# Patient Record
Sex: FEMALE | Race: BLACK OR AFRICAN AMERICAN | NOT HISPANIC OR LATINO | Employment: OTHER | URBAN - METROPOLITAN AREA
[De-identification: names, ages, dates, MRNs, and addresses within clinical notes are randomized per-mention and may not be internally consistent; named-entity substitution may affect disease eponyms.]

---

## 2017-01-02 ENCOUNTER — TELEPHONE (OUTPATIENT)
Dept: INTERNAL MEDICINE | Facility: CLINIC | Age: 72
End: 2017-01-02

## 2017-01-02 DIAGNOSIS — E83.52 HYPERCALCEMIA: Primary | ICD-10-CM

## 2017-01-02 NOTE — TELEPHONE ENCOUNTER
Please informed patient that labs are stable, except for mildly elevated calcium and depressed potassium.  Recommend potassium supplementation with tomatoes, bananas and low sugar on orange juice.  Use one of these daily for 5-7 days.  Also, schedule repeat labs to further evaluate elevated calcium.

## 2017-01-05 ENCOUNTER — TELEPHONE (OUTPATIENT)
Dept: INTERNAL MEDICINE | Facility: CLINIC | Age: 72
End: 2017-01-05

## 2017-01-05 NOTE — TELEPHONE ENCOUNTER
Please informed patient that labs are stable, except for mildly elevated calcium and depressed potassium. Recommend potassium supplementation with tomatoes, bananas and low sugar on orange juice. Use one of these daily for 5-7 days. Also, schedule repeat labs to further evaluate elevated calcium    Spoke with the patient and advised of the labs and she states she eats a banana daily and also advised that she needs to add the Tomato for lunch for 7 days and come do repeat lab on Saturday 1/14/2017 Patient understood and scheduled the appt.

## 2017-01-05 NOTE — TELEPHONE ENCOUNTER
----- Message from Aurora Gu sent at 1/5/2017 12:53 PM CST -----  Contact: Self [phone]  Patient is returning a phone call.  Who left a message for the patient:   Does patient know what this is regarding:    Comments:

## 2017-01-14 ENCOUNTER — LAB VISIT (OUTPATIENT)
Dept: LAB | Facility: HOSPITAL | Age: 72
End: 2017-01-14
Attending: INTERNAL MEDICINE
Payer: COMMERCIAL

## 2017-01-14 DIAGNOSIS — E83.52 HYPERCALCEMIA: ICD-10-CM

## 2017-01-14 LAB — CA-I BLDV-SCNC: 1.21 MMOL/L

## 2017-01-14 PROCEDURE — 83970 ASSAY OF PARATHORMONE: CPT

## 2017-01-14 PROCEDURE — 36415 COLL VENOUS BLD VENIPUNCTURE: CPT | Mod: PO

## 2017-01-14 PROCEDURE — 82330 ASSAY OF CALCIUM: CPT

## 2017-01-16 LAB — PTH-INTACT SERPL-MCNC: 35 PG/ML

## 2017-01-17 ENCOUNTER — OFFICE VISIT (OUTPATIENT)
Dept: OPTOMETRY | Facility: CLINIC | Age: 72
End: 2017-01-17
Payer: COMMERCIAL

## 2017-01-17 DIAGNOSIS — H50.15 ALTERNATING EXOTROPIA: ICD-10-CM

## 2017-01-17 DIAGNOSIS — H25.13 NUCLEAR SCLEROSIS, BILATERAL: ICD-10-CM

## 2017-01-17 DIAGNOSIS — E11.9 TYPE 2 DIABETES MELLITUS WITHOUT RETINOPATHY: Primary | ICD-10-CM

## 2017-01-17 PROCEDURE — 92015 DETERMINE REFRACTIVE STATE: CPT | Mod: S$GLB,,, | Performed by: OPTOMETRIST

## 2017-01-17 PROCEDURE — 99999 PR PBB SHADOW E&M-EST. PATIENT-LVL II: CPT | Mod: PBBFAC,,, | Performed by: OPTOMETRIST

## 2017-01-17 PROCEDURE — 92014 COMPRE OPH EXAM EST PT 1/>: CPT | Mod: S$GLB,,, | Performed by: OPTOMETRIST

## 2017-01-17 NOTE — PROGRESS NOTES
HPI     DLS: 1/14/2016  Pt states va has gotten blurry since last visit. Pt states she no longer   drives at night bc she can not see.Pt srates she does not wear glasses   because glasses does not improve vision.  States left eye has been twitching a lot more than usual.  Denies f/f,   diplopia, and headaches    No gtts     Does not check BS   Hemoglobin A1C       Date                     Value               Ref Range             Status                12/07/2016               6.1                 4.5 - 6.2 %           Final                   04/12/2016               6.2                 4.5 - 6.2 %           Final                 05/14/2015               6.0                 4.5 - 6.2 %           Final            ----------       Last edited by Elena Soto on 1/17/2017  2:58 PM.     ROS     Positive for: Gastrointestinal, Endocrine, Cardiovascular, Eyes    Negative for: Constitutional, Neurological, Skin, Genitourinary,   Musculoskeletal, HENT, Respiratory, Psychiatric, Allergic/Imm, Heme/Lymph    Last edited by Juan Blanchard, OD on 1/17/2017  3:08 PM. (History)        Assessment /Plan     For exam results, see Encounter Report.    Type 2 diabetes mellitus without retinopathy    Nuclear sclerosis, bilateral    Alternating exotropia        1. AXT   2. Cat ou--pt wishes surgery.    3. DM- WITHOUT RETINOPATHY.  Advised yearly DFE        Plan:     Surgical consult-- Dr Juárez

## 2017-02-15 NOTE — TELEPHONE ENCOUNTER
----- Message from Eve Hdz sent at 2/15/2017 12:57 PM CST -----  Contact: patient [phone]  RX request - refill or new RX.  Is this a refill or new RX:  Refill  RX name and strength: diazepam 5 mg  Directions:   Is this a 30 day or 90 day RX:    Pharmacy name and phone #: [facility] [phone]  Comments:

## 2017-02-16 RX ORDER — DIAZEPAM 5 MG/1
5 TABLET ORAL NIGHTLY PRN
Qty: 30 TABLET | Refills: 3 | Status: SHIPPED | OUTPATIENT
Start: 2017-02-16 | End: 2017-07-23 | Stop reason: SDUPTHER

## 2017-03-08 RX ORDER — METFORMIN HYDROCHLORIDE 500 MG/1
TABLET ORAL
Qty: 60 TABLET | Refills: 3 | Status: SHIPPED | OUTPATIENT
Start: 2017-03-08 | End: 2017-05-02 | Stop reason: SDUPTHER

## 2017-03-08 RX ORDER — HYDROCHLOROTHIAZIDE 25 MG/1
TABLET ORAL
Qty: 30 TABLET | Refills: 3 | Status: SHIPPED | OUTPATIENT
Start: 2017-03-08 | End: 2017-07-04 | Stop reason: SDUPTHER

## 2017-03-08 RX ORDER — VALSARTAN 160 MG/1
TABLET ORAL
Qty: 30 TABLET | Refills: 3 | Status: SHIPPED | OUTPATIENT
Start: 2017-03-08 | End: 2017-07-03 | Stop reason: SDUPTHER

## 2017-03-08 RX ORDER — LOVASTATIN 40 MG/1
TABLET ORAL
Qty: 30 TABLET | Refills: 3 | Status: SHIPPED | OUTPATIENT
Start: 2017-03-08 | End: 2017-07-03 | Stop reason: SDUPTHER

## 2017-04-06 RX ORDER — PANTOPRAZOLE SODIUM 40 MG/1
TABLET, DELAYED RELEASE ORAL
Qty: 30 TABLET | Refills: 3 | Status: SHIPPED | OUTPATIENT
Start: 2017-04-06 | End: 2017-08-01 | Stop reason: SDUPTHER

## 2017-04-17 RX ORDER — TRAZODONE HYDROCHLORIDE 150 MG/1
TABLET ORAL
Qty: 30 TABLET | Refills: 3 | Status: SHIPPED | OUTPATIENT
Start: 2017-04-17 | End: 2017-08-15 | Stop reason: SDUPTHER

## 2017-04-28 ENCOUNTER — TELEPHONE (OUTPATIENT)
Dept: INTERNAL MEDICINE | Facility: CLINIC | Age: 72
End: 2017-04-28

## 2017-04-28 RX ORDER — NIFEDIPINE 90 MG/1
TABLET, EXTENDED RELEASE ORAL
Qty: 30 TABLET | Refills: 3 | Status: SHIPPED | OUTPATIENT
Start: 2017-04-28 | End: 2017-05-02 | Stop reason: SDUPTHER

## 2017-04-28 RX ORDER — NIFEDIPINE 90 MG/1
TABLET, EXTENDED RELEASE ORAL
Qty: 30 TABLET | Refills: 3 | Status: SHIPPED | OUTPATIENT
Start: 2017-04-28 | End: 2018-06-04 | Stop reason: SDUPTHER

## 2017-04-28 NOTE — TELEPHONE ENCOUNTER
----- Message from Ori Freeman sent at 4/27/2017 11:43 AM CDT -----  Contact: self  [phone]  Patient would like to get medical advice.  Symptoms (please be specific):  diarrhea   How long has patient had these symptoms:  1 month or longer   Pharmacy name and phone #:  [facility] [id number]  [phone] (Phone)  [phone] (Fax)  Any drug allergies:  Darvocet A500  [Propoxyphene   Comments: Pt like to know if she is taken any type of med's that is causing the diarrha.

## 2017-05-02 ENCOUNTER — OFFICE VISIT (OUTPATIENT)
Dept: INTERNAL MEDICINE | Facility: CLINIC | Age: 72
End: 2017-05-02
Payer: COMMERCIAL

## 2017-05-02 ENCOUNTER — LAB VISIT (OUTPATIENT)
Dept: LAB | Facility: HOSPITAL | Age: 72
End: 2017-05-02
Attending: INTERNAL MEDICINE
Payer: COMMERCIAL

## 2017-05-02 VITALS
TEMPERATURE: 99 F | BODY MASS INDEX: 30.86 KG/M2 | DIASTOLIC BLOOD PRESSURE: 70 MMHG | HEIGHT: 64 IN | HEART RATE: 82 BPM | WEIGHT: 180.75 LBS | SYSTOLIC BLOOD PRESSURE: 128 MMHG

## 2017-05-02 DIAGNOSIS — R19.7 DIARRHEA, UNSPECIFIED TYPE: Primary | ICD-10-CM

## 2017-05-02 DIAGNOSIS — R19.7 DIARRHEA, UNSPECIFIED TYPE: ICD-10-CM

## 2017-05-02 LAB
ALBUMIN SERPL BCP-MCNC: 3.5 G/DL
ALP SERPL-CCNC: 83 U/L
ALT SERPL W/O P-5'-P-CCNC: 27 U/L
ANION GAP SERPL CALC-SCNC: 14 MMOL/L
AST SERPL-CCNC: 47 U/L
BILIRUB SERPL-MCNC: 0.5 MG/DL
BUN SERPL-MCNC: 13 MG/DL
CALCIUM SERPL-MCNC: 10 MG/DL
CHLORIDE SERPL-SCNC: 103 MMOL/L
CO2 SERPL-SCNC: 23 MMOL/L
CREAT SERPL-MCNC: 1.4 MG/DL
EST. GFR  (AFRICAN AMERICAN): 43.3 ML/MIN/1.73 M^2
EST. GFR  (NON AFRICAN AMERICAN): 37.6 ML/MIN/1.73 M^2
GLUCOSE SERPL-MCNC: 99 MG/DL
POTASSIUM SERPL-SCNC: 3.5 MMOL/L
PROT SERPL-MCNC: 8.1 G/DL
SODIUM SERPL-SCNC: 140 MMOL/L

## 2017-05-02 PROCEDURE — 99999 PR PBB SHADOW E&M-EST. PATIENT-LVL III: CPT | Mod: PBBFAC,,, | Performed by: INTERNAL MEDICINE

## 2017-05-02 PROCEDURE — 1126F AMNT PAIN NOTED NONE PRSNT: CPT | Mod: S$GLB,,, | Performed by: INTERNAL MEDICINE

## 2017-05-02 PROCEDURE — 1157F ADVNC CARE PLAN IN RCRD: CPT | Mod: 8P,S$GLB,, | Performed by: INTERNAL MEDICINE

## 2017-05-02 PROCEDURE — 3074F SYST BP LT 130 MM HG: CPT | Mod: S$GLB,,, | Performed by: INTERNAL MEDICINE

## 2017-05-02 PROCEDURE — 80053 COMPREHEN METABOLIC PANEL: CPT

## 2017-05-02 PROCEDURE — 3078F DIAST BP <80 MM HG: CPT | Mod: S$GLB,,, | Performed by: INTERNAL MEDICINE

## 2017-05-02 PROCEDURE — 36415 COLL VENOUS BLD VENIPUNCTURE: CPT | Mod: PO

## 2017-05-02 PROCEDURE — 1160F RVW MEDS BY RX/DR IN RCRD: CPT | Mod: S$GLB,,, | Performed by: INTERNAL MEDICINE

## 2017-05-02 PROCEDURE — 1159F MED LIST DOCD IN RCRD: CPT | Mod: S$GLB,,, | Performed by: INTERNAL MEDICINE

## 2017-05-02 PROCEDURE — 99213 OFFICE O/P EST LOW 20 MIN: CPT | Mod: S$GLB,,, | Performed by: INTERNAL MEDICINE

## 2017-05-02 RX ORDER — METFORMIN HYDROCHLORIDE 500 MG/1
500 TABLET ORAL
Qty: 60 TABLET | Refills: 3
Start: 2017-05-02 | End: 2017-11-06 | Stop reason: SDUPTHER

## 2017-05-02 NOTE — PROGRESS NOTES
CC: diarrhea  HPI:  The patient is a 71 y.o. year old female who presents to the office for diarrhea.  She reports a long history of diarrhea, but reports worsening symptoms recently.  She denies any recent antibiotic use.  She denies any abdominal pain, but does report occasional nausea.  She denies any direct association with meals, and does report occasional accidents.  She denies any blood in stool.    The patient reports decreased appetite.   She has stopped taking fish oil with some improvement.  Her last colonoscopy was 2014.  The patient has been under a lot of stress.  She recently learned her sister is dying.    PAST MEDICAL HISTORY:  Past Medical History:   Diagnosis Date    Alternating exotropia 4/18/2013    Bilateral cataracts     Depression     Diabetes mellitus     GERD (gastroesophageal reflux disease)     Hyperlipidemia     Hypertension     Menopausal state     Multiple gastric polyps     on EGD in 2004    Sickle cell trait        SURGICAL HISTORY:  Past Surgical History:   Procedure Laterality Date    CHOLECYSTECTOMY      laproscopic cholecystectomy    HYSTERECTOMY      DENISHA/BSO - fibroids    Laparoscopic right hemicolectomy      OOPHORECTOMY         MEDS:  Medcard reviewed and updated    ALLERGIES: Allergy Card reviewed and updated    SOCIAL HISTORY:   The patient is a nonsmoker.    PE:   APPEARANCE: Well nourished, well developed, in no acute distress.    CHEST: Lungs clear to auscultation with unlabored respirations.  CARDIOVASCULAR: Normal S1, S2. No murmurs. No carotid bruits. No pedal edema.  ABDOMEN: Bowel sounds normal. Not distended. Soft. No tenderness or masses.   PSYCHIATRIC: The patient is oriented to person, place, and time and has a pleasant affect.        ASSESSMENT/PLAN:  Val was seen today for gi problem.    Diagnoses and all orders for this visit:    Diarrhea, unspecified type  -     Comprehensive metabolic panel; Future   -     Decrease metformin to 500 mg a  day  -     Remain off fish oil    Other orders  -     metformin (GLUCOPHAGE) 500 MG tablet; Take 1 tablet (500 mg total) by mouth daily with breakfast.

## 2017-05-02 NOTE — MR AVS SNAPSHOT
[facility]   [facility]  [place] [address]  Phone: [phone]  Fax: [phone]                  Val Bragg   2017 3:00 PM   Office Visit    Description:  Female : 1945   Provider:  Anh Atkinson MD   Department:  [facility]           Reason for Visit     GI Problem           Diagnoses this Visit        Comments    Diarrhea, unspecified type    -  Primary            To Do List           Future Appointments        Provider Department Dept Phone    2017 2:00 PM Dustin Juárez MD [facility] - Ophthalmology [phone]      Goals (5 Years of Data)     None      Follow-Up and Disposition     Return in about 3 months (around 2017).       These Medications        Disp Refills Start End    metformin (GLUCOPHAGE) 500 MG tablet 60 tablet 3 2017     Take 1 tablet (500 mg total) by mouth daily with breakfast. - Oral    Pharmacy: [facility] [address] [place] [address] AT [facility] [address] Ph #: [phone]         [facility]     [facility] -  Assistance  Unless otherwise directed by your provider, please contact [facility], our nurse care line that is available for  assistance.     Registered nurses in the [facility] provide: appointment scheduling, clinical advisement, health education, and other advisory services.  Call: [phone] (toll free)               Medications           Message regarding Medications     Verify the changes and/or additions to your medication regime listed below are the same as discussed with your clinician today.  If any of these changes or additions are incorrect, please notify your healthcare provider.        CHANGE how you are taking these medications     Start Taking Instead of    metformin (GLUCOPHAGE) 500 MG tablet metformin (GLUCOPHAGE) 500 MG tablet    Dosage:  Take 1 tablet (500 mg total) by  "mouth daily with breakfast. Dosage:  TAKE 1 TABLET(500 MG) BY MOUTH TWICE DAILY    Reason for Change:  Reorder       STOP taking these medications     OMEGA-3S/DHA/EPA/FISH OIL (OMEGA 3 ORAL) Take 1 capsule by mouth once daily.           Verify that the below list of medications is an accurate representation of the medications you are currently taking.  If none reported, the list may be blank. If incorrect, please contact your healthcare provider. Carry this list with you in case of emergency.           Current Medications     blood sugar diagnostic Strp 1 strip by Misc.(Non-Drug; Combo Route) route once daily.    diazePAM (VALIUM) 5 MG tablet Take 1 tablet (5 mg total) by mouth nightly as needed for Anxiety.    fluoxetine (PROZAC) 40 MG capsule TAKE 1 CAPSULE BY MOUTH EVERY DAY    hydrochlorothiazide (HYDRODIURIL) 25 MG tablet TAKE 1 TABLET BY MOUTH EVERY DAY    lovastatin (MEVACOR) 40 MG tablet TAKE 1 TABLET BY MOUTH EVERY NIGHT AT BEDTIME    metformin (GLUCOPHAGE) 500 MG tablet Take 1 tablet (500 mg total) by mouth daily with breakfast.    pantoprazole (PROTONIX) 40 MG tablet TAKE 1 TABLET BY MOUTH ONCE DAILY    trazodone (DESYREL) 150 MG tablet TAKE 1 TABLET BY MOUTH EVERY NIGHT AT BEDTIME    valsartan (DIOVAN) 160 MG tablet TAKE 1 TABLET BY MOUTH ONCE DAILY    mometasone (NASONEX) 50 mcg/actuation nasal spray 2 sprays by Nasal route every evening.    nifedipine (PROCARDIA-XL) 90 MG (OSM) TR24 TAKE 1 TABLET(90 MG) BY MOUTH EVERY DAY           Clinical Reference Information           Your Vitals Were     BP Pulse Temp Height Weight BMI    128/70 82 98.5 °F (36.9 °C) 5' 4" (1.626 m) 82 kg (180 lb 12.4 oz) 31.03 kg/m2      Blood Pressure          Most Recent Value    BP  128/70      Allergies as of 5/2/2017     Darvocet A500  [Propoxyphene N-acetaminophen]      Immunizations Administered on Date of Encounter - 5/2/2017     None      Orders Placed During Today's Visit     Future Labs/Procedures Expected by Expires "    Comprehensive metabolic panel  5/2/2017 7/1/2018      Instructions    Decrease metformin to once daily.       Language Assistance Services     ATTENTION: Language assistance services are available, free of charge. Please call [phone].      ATENCIÓN: Si tita sow, tiene a henry disposición servicios gratuitos de asistencia lingüística. Llame al [phone].     CHÚ Ý: N?u b?n nói Ti?ng Vi?t, có các d?ch v? h? tr? ngôn ng? mi?n phí dành cho b?n. G?i s? [phone].         [facility] complies with applicable Federal civil rights laws and does not discriminate on the basis of race, color, national origin, age, disability, or sex.

## 2017-05-24 ENCOUNTER — OFFICE VISIT (OUTPATIENT)
Dept: OPHTHALMOLOGY | Facility: CLINIC | Age: 72
End: 2017-05-24
Payer: COMMERCIAL

## 2017-05-24 DIAGNOSIS — H25.13 NUCLEAR SCLEROSIS, BILATERAL: Primary | ICD-10-CM

## 2017-05-24 DIAGNOSIS — H52.209 ASTIGMATISM, UNSPECIFIED: ICD-10-CM

## 2017-05-24 PROCEDURE — 99999 PR PBB SHADOW E&M-EST. PATIENT-LVL II: CPT | Mod: PBBFAC,,, | Performed by: OPHTHALMOLOGY

## 2017-05-24 PROCEDURE — 92014 COMPRE OPH EXAM EST PT 1/>: CPT | Mod: S$GLB,,, | Performed by: OPHTHALMOLOGY

## 2017-05-24 NOTE — PROGRESS NOTES
HPI     Referred by Dr. Blanchard    Patient here for a cataract evaluation.  Patient states her vision is   blurry when watching TV with her glasses on. She says does not necessarily   have problems at night. She states she does not go out much at night. She   does not drive.     Last edited by Nelly Ashford on 5/24/2017  2:18 PM. (History)            Assessment /Plan     For exam results, see Encounter Report.    Nuclear sclerosis, bilateral    Astigmatism, unspecified      2+ NSC with high level astigmatism.  Rec TORIC IOLs but pt to consider...

## 2017-06-16 DIAGNOSIS — E11.9 TYPE 2 DIABETES MELLITUS WITHOUT COMPLICATION: ICD-10-CM

## 2017-07-03 RX ORDER — METFORMIN HYDROCHLORIDE 500 MG/1
TABLET ORAL
Qty: 60 TABLET | Refills: 3 | Status: SHIPPED | OUTPATIENT
Start: 2017-07-03 | End: 2017-08-02 | Stop reason: SDUPTHER

## 2017-07-03 RX ORDER — VALSARTAN 160 MG/1
TABLET ORAL
Qty: 30 TABLET | Refills: 3 | Status: SHIPPED | OUTPATIENT
Start: 2017-07-03 | End: 2017-10-29 | Stop reason: SDUPTHER

## 2017-07-03 RX ORDER — LOVASTATIN 40 MG/1
TABLET ORAL
Qty: 30 TABLET | Refills: 3 | Status: SHIPPED | OUTPATIENT
Start: 2017-07-03 | End: 2017-10-29 | Stop reason: SDUPTHER

## 2017-07-05 RX ORDER — HYDROCHLOROTHIAZIDE 25 MG/1
TABLET ORAL
Qty: 30 TABLET | Refills: 3 | Status: SHIPPED | OUTPATIENT
Start: 2017-07-05 | End: 2018-06-04 | Stop reason: SDUPTHER

## 2017-07-18 RX ORDER — HYDROCHLOROTHIAZIDE 25 MG/1
TABLET ORAL
Qty: 30 TABLET | Refills: 3 | Status: SHIPPED | OUTPATIENT
Start: 2017-07-18 | End: 2017-08-02 | Stop reason: SDUPTHER

## 2017-07-24 RX ORDER — DIAZEPAM 5 MG/1
TABLET ORAL
Qty: 30 TABLET | Refills: 2 | Status: SHIPPED | OUTPATIENT
Start: 2017-07-24 | End: 2017-11-06 | Stop reason: SDUPTHER

## 2017-08-01 RX ORDER — PANTOPRAZOLE SODIUM 40 MG/1
TABLET, DELAYED RELEASE ORAL
Qty: 30 TABLET | Refills: 0 | Status: SHIPPED | OUTPATIENT
Start: 2017-08-01 | End: 2017-08-29 | Stop reason: SDUPTHER

## 2017-08-02 ENCOUNTER — OFFICE VISIT (OUTPATIENT)
Dept: INTERNAL MEDICINE | Facility: CLINIC | Age: 72
End: 2017-08-02
Payer: COMMERCIAL

## 2017-08-02 ENCOUNTER — HOSPITAL ENCOUNTER (OUTPATIENT)
Dept: RADIOLOGY | Facility: HOSPITAL | Age: 72
Discharge: HOME | End: 2017-08-02
Attending: INTERNAL MEDICINE
Payer: COMMERCIAL

## 2017-08-02 VITALS
HEIGHT: 64 IN | DIASTOLIC BLOOD PRESSURE: 72 MMHG | SYSTOLIC BLOOD PRESSURE: 138 MMHG | RESPIRATION RATE: 16 BRPM | TEMPERATURE: 99 F | BODY MASS INDEX: 31.32 KG/M2 | HEART RATE: 63 BPM | WEIGHT: 183.44 LBS

## 2017-08-02 DIAGNOSIS — M54.5 LOW BACK PAIN, UNSPECIFIED BACK PAIN LATERALITY, UNSPECIFIED CHRONICITY, WITH SCIATICA PRESENCE UNSPECIFIED: ICD-10-CM

## 2017-08-02 DIAGNOSIS — E11.9 CONTROLLED TYPE 2 DIABETES MELLITUS WITHOUT COMPLICATION, WITHOUT LONG-TERM CURRENT USE OF INSULIN: ICD-10-CM

## 2017-08-02 DIAGNOSIS — I10 ESSENTIAL HYPERTENSION: Primary | ICD-10-CM

## 2017-08-02 PROCEDURE — 1126F AMNT PAIN NOTED NONE PRSNT: CPT | Mod: S$GLB,,, | Performed by: INTERNAL MEDICINE

## 2017-08-02 PROCEDURE — 1159F MED LIST DOCD IN RCRD: CPT | Mod: S$GLB,,, | Performed by: INTERNAL MEDICINE

## 2017-08-02 PROCEDURE — 3044F HG A1C LEVEL LT 7.0%: CPT | Mod: S$GLB,,, | Performed by: INTERNAL MEDICINE

## 2017-08-02 PROCEDURE — 72100 X-RAY EXAM L-S SPINE 2/3 VWS: CPT | Mod: TC,PO

## 2017-08-02 PROCEDURE — 4010F ACE/ARB THERAPY RXD/TAKEN: CPT | Mod: S$GLB,,, | Performed by: INTERNAL MEDICINE

## 2017-08-02 PROCEDURE — 99213 OFFICE O/P EST LOW 20 MIN: CPT | Mod: S$GLB,,, | Performed by: INTERNAL MEDICINE

## 2017-08-02 PROCEDURE — 72100 X-RAY EXAM L-S SPINE 2/3 VWS: CPT | Mod: 26,,, | Performed by: RADIOLOGY

## 2017-08-02 PROCEDURE — 99999 PR PBB SHADOW E&M-EST. PATIENT-LVL IV: CPT | Mod: PBBFAC,,, | Performed by: INTERNAL MEDICINE

## 2017-08-02 NOTE — PROGRESS NOTES
CC: followup of hypertension  HPI:  The patient is a 71 y.o. year old female who presents to the office for followup of hypertension.  The patient denies any chest pain, shortness of breath, headache, blurred vision, excessive fatigue, nausea or vomiting.  Diarrhea has improved with decreased metformin dose.  She reports feeling off balanced when walking.  The patient complains of back pain, exacerbated by prolonged standing.  The pain is a throbbing pain involving her lower back.  She reports intermittent radiation, numbness and tingling.  She states she fell out of the bed last Saturday after cushion on her mattress slid off her bed.    PAST MEDICAL HISTORY:  Past Medical History:   Diagnosis Date    Alternating exotropia 4/18/2013    Bilateral cataracts     Depression     Diabetes mellitus     GERD (gastroesophageal reflux disease)     Hyperlipidemia     Hypertension     Menopausal state     Multiple gastric polyps     on EGD in 2004    Sickle cell trait        SURGICAL HISTORY:  Past Surgical History:   Procedure Laterality Date    CHOLECYSTECTOMY      laproscopic cholecystectomy    HYSTERECTOMY      DENISHA/BSO - fibroids    Laparoscopic right hemicolectomy      OOPHORECTOMY         MEDS:  Medcard reviewed and updated    ALLERGIES: Allergy Card reviewed and updated    SOCIAL HISTORY:   The patient is a nonsmoker.    PE:   APPEARANCE: Well nourished, well developed, in no acute distress.    CHEST: Lungs clear to auscultation with unlabored respirations.  CARDIOVASCULAR: Normal S1, S2. No murmurs. No carotid bruits. No pedal edema.  ABDOMEN: Bowel sounds normal. Not distended. Soft. No tenderness or masses.   MUSCULOSKELETAL:  Normal gait.  PSYCHIATRIC: The patient is oriented to person, place, and time and has a pleasant affect.        ASSESSMENT/PLAN:  Val was seen today for follow-up.    Diagnoses and all orders for this visit:    Essential hypertension  -     Basic metabolic panel; Future  -      Blood pressure is okay    Controlled type 2 diabetes mellitus without complication, without long-term current use of insulin  -     Basic metabolic panel; Future  -     HbA1c    Low back pain, unspecified back pain laterality, unspecified chronicity, with sciatica presence unspecified  -     X-Ray Lumbar Spine Ap And Lateral; Future

## 2017-08-03 ENCOUNTER — TELEPHONE (OUTPATIENT)
Dept: INTERNAL MEDICINE | Facility: CLINIC | Age: 72
End: 2017-08-03

## 2017-08-03 NOTE — TELEPHONE ENCOUNTER
Called the lab and was advised only a green top was drawn and not a purple Top. Called the patient to schedule  There was no answer lmom for a RTC

## 2017-08-04 ENCOUNTER — TELEPHONE (OUTPATIENT)
Dept: INTERNAL MEDICINE | Facility: CLINIC | Age: 72
End: 2017-08-04

## 2017-08-04 NOTE — TELEPHONE ENCOUNTER
----- Message from Kathrine Gama sent at 8/3/2017  5:14 PM CDT -----  Contact: Self   Pt missed a phone call from the nurse and would like a call back at her earliest convenience         Pt can be contacted at [phone]

## 2017-08-04 NOTE — TELEPHONE ENCOUNTER
----- Message from Emil Fuentes sent at 8/4/2017  2:13 PM CDT -----  Contact: Pt at [phone]  Patient is returning a phone call.  Who left a message for the patient: Yoanna

## 2017-08-08 ENCOUNTER — LAB VISIT (OUTPATIENT)
Dept: LAB | Facility: HOSPITAL | Age: 72
End: 2017-08-08
Attending: INTERNAL MEDICINE
Payer: COMMERCIAL

## 2017-08-08 DIAGNOSIS — E11.9 TYPE 2 DIABETES MELLITUS WITHOUT COMPLICATION: ICD-10-CM

## 2017-08-08 PROCEDURE — 83036 HEMOGLOBIN GLYCOSYLATED A1C: CPT

## 2017-08-08 PROCEDURE — 36415 COLL VENOUS BLD VENIPUNCTURE: CPT | Mod: PO

## 2017-08-09 LAB
ESTIMATED AVG GLUCOSE: 143 MG/DL
HBA1C MFR BLD HPLC: 6.6 %

## 2017-08-15 RX ORDER — TRAZODONE HYDROCHLORIDE 150 MG/1
TABLET ORAL
Qty: 30 TABLET | Refills: 3 | Status: SHIPPED | OUTPATIENT
Start: 2017-08-15 | End: 2017-12-16 | Stop reason: SDUPTHER

## 2017-08-29 RX ORDER — PANTOPRAZOLE SODIUM 40 MG/1
TABLET, DELAYED RELEASE ORAL
Qty: 30 TABLET | Refills: 3 | Status: SHIPPED | OUTPATIENT
Start: 2017-08-29 | End: 2017-12-08 | Stop reason: SDUPTHER

## 2017-10-30 RX ORDER — LOVASTATIN 40 MG/1
TABLET ORAL
Qty: 30 TABLET | Refills: 3 | Status: SHIPPED | OUTPATIENT
Start: 2017-10-30 | End: 2018-02-25 | Stop reason: SDUPTHER

## 2017-10-30 RX ORDER — VALSARTAN 160 MG/1
TABLET ORAL
Qty: 30 TABLET | Refills: 3 | Status: SHIPPED | OUTPATIENT
Start: 2017-10-30 | End: 2018-02-25 | Stop reason: SDUPTHER

## 2017-10-30 RX ORDER — METFORMIN HYDROCHLORIDE 500 MG/1
TABLET ORAL
Qty: 60 TABLET | Refills: 3 | Status: SHIPPED | OUTPATIENT
Start: 2017-10-30 | End: 2017-11-06 | Stop reason: SDUPTHER

## 2017-11-06 ENCOUNTER — OFFICE VISIT (OUTPATIENT)
Dept: INTERNAL MEDICINE | Facility: CLINIC | Age: 72
End: 2017-11-06
Payer: MEDICARE

## 2017-11-06 ENCOUNTER — LAB VISIT (OUTPATIENT)
Dept: LAB | Facility: HOSPITAL | Age: 72
End: 2017-11-06
Attending: INTERNAL MEDICINE
Payer: MEDICARE

## 2017-11-06 VITALS
RESPIRATION RATE: 20 BRPM | HEART RATE: 85 BPM | SYSTOLIC BLOOD PRESSURE: 154 MMHG | TEMPERATURE: 99 F | HEIGHT: 65 IN | DIASTOLIC BLOOD PRESSURE: 72 MMHG | WEIGHT: 186.75 LBS | BODY MASS INDEX: 31.11 KG/M2

## 2017-11-06 DIAGNOSIS — E11.9 CONTROLLED TYPE 2 DIABETES MELLITUS WITHOUT COMPLICATION, WITHOUT LONG-TERM CURRENT USE OF INSULIN: ICD-10-CM

## 2017-11-06 DIAGNOSIS — R23.2 HOT FLASHES: ICD-10-CM

## 2017-11-06 DIAGNOSIS — I10 ESSENTIAL HYPERTENSION: Primary | ICD-10-CM

## 2017-11-06 DIAGNOSIS — I10 ESSENTIAL HYPERTENSION: ICD-10-CM

## 2017-11-06 LAB
ALBUMIN SERPL BCP-MCNC: 3.4 G/DL
ALP SERPL-CCNC: 104 U/L
ALT SERPL W/O P-5'-P-CCNC: 49 U/L
ANION GAP SERPL CALC-SCNC: 9 MMOL/L
AST SERPL-CCNC: 76 U/L
BILIRUB SERPL-MCNC: 0.4 MG/DL
BUN SERPL-MCNC: 13 MG/DL
CALCIUM SERPL-MCNC: 10.5 MG/DL
CHLORIDE SERPL-SCNC: 101 MMOL/L
CHOLEST SERPL-MCNC: 153 MG/DL
CHOLEST/HDLC SERPL: 3 {RATIO}
CO2 SERPL-SCNC: 31 MMOL/L
CREAT SERPL-MCNC: 1.3 MG/DL
EST. GFR  (AFRICAN AMERICAN): 47.4 ML/MIN/1.73 M^2
EST. GFR  (NON AFRICAN AMERICAN): 41.1 ML/MIN/1.73 M^2
ESTIMATED AVG GLUCOSE: 148 MG/DL
GLUCOSE SERPL-MCNC: 154 MG/DL
HBA1C MFR BLD HPLC: 6.8 %
HDLC SERPL-MCNC: 51 MG/DL
HDLC SERPL: 33.3 %
LDLC SERPL CALC-MCNC: 82.6 MG/DL
NONHDLC SERPL-MCNC: 102 MG/DL
POTASSIUM SERPL-SCNC: 3.6 MMOL/L
PROT SERPL-MCNC: 8.2 G/DL
SODIUM SERPL-SCNC: 141 MMOL/L
TRIGL SERPL-MCNC: 97 MG/DL
TSH SERPL DL<=0.005 MIU/L-ACNC: 1.28 UIU/ML

## 2017-11-06 PROCEDURE — 84443 ASSAY THYROID STIM HORMONE: CPT

## 2017-11-06 PROCEDURE — 80061 LIPID PANEL: CPT

## 2017-11-06 PROCEDURE — 99213 OFFICE O/P EST LOW 20 MIN: CPT | Mod: PBBFAC,PO | Performed by: INTERNAL MEDICINE

## 2017-11-06 PROCEDURE — 80053 COMPREHEN METABOLIC PANEL: CPT

## 2017-11-06 PROCEDURE — G0008 ADMIN INFLUENZA VIRUS VAC: HCPCS | Mod: PBBFAC,PO

## 2017-11-06 PROCEDURE — 36415 COLL VENOUS BLD VENIPUNCTURE: CPT | Mod: PO

## 2017-11-06 PROCEDURE — 99999 PR PBB SHADOW E&M-EST. PATIENT-LVL III: CPT | Mod: PBBFAC,,, | Performed by: INTERNAL MEDICINE

## 2017-11-06 PROCEDURE — 83036 HEMOGLOBIN GLYCOSYLATED A1C: CPT

## 2017-11-06 PROCEDURE — 99214 OFFICE O/P EST MOD 30 MIN: CPT | Mod: S$PBB,,, | Performed by: INTERNAL MEDICINE

## 2017-11-06 RX ORDER — METFORMIN HYDROCHLORIDE 500 MG/1
500 TABLET ORAL
Qty: 30 TABLET | Refills: 3
Start: 2017-11-06 | End: 2018-06-04

## 2017-11-06 RX ORDER — DIAZEPAM 5 MG/1
5 TABLET ORAL NIGHTLY PRN
Qty: 30 TABLET | Refills: 2 | Status: SHIPPED | OUTPATIENT
Start: 2017-11-06 | End: 2018-02-01 | Stop reason: SDUPTHER

## 2017-11-06 NOTE — PROGRESS NOTES
CC: followup of hypertension  HPI:  The patient is a 71 y.o. year old female who presents to the office for followup of hypertension.  The patient denies any chest pain, shortness of breath, headache, blurred vision, excessive fatigue, nausea or vomiting.  She reports recent neck and right shoulder pain.  She has not taken her blood pressure medication yet today.     PAST MEDICAL HISTORY:  Past Medical History:   Diagnosis Date    Alternating exotropia 4/18/2013    Bilateral cataracts     Depression     Diabetes mellitus     GERD (gastroesophageal reflux disease)     Hyperlipidemia     Hypertension     Menopausal state     Multiple gastric polyps     on EGD in 2004    Sickle cell trait        SURGICAL HISTORY:  Past Surgical History:   Procedure Laterality Date    CHOLECYSTECTOMY      laproscopic cholecystectomy    HYSTERECTOMY      DENISHA/BSO - fibroids    Laparoscopic right hemicolectomy      OOPHORECTOMY         MEDS:  Medcard reviewed and updated    ALLERGIES: Allergy Card reviewed and updated    SOCIAL HISTORY:   The patient is a nonsmoker.    PE:   APPEARANCE: Well nourished, well developed, in no acute distress.    CHEST: Lungs clear to auscultation with unlabored respirations.  CARDIOVASCULAR: Normal S1, S2. No murmurs. No carotid bruits. No pedal edema.  ABDOMEN: Bowel sounds normal. Not distended. Soft. No tenderness or masses.   PSYCHIATRIC: The patient is oriented to person, place, and time and has a pleasant affect.        ASSESSMENT/PLAN:  Val was seen today for follow-up.    Diagnoses and all orders for this visit:    Essential hypertension  -     TSH; Future  -     Lipid panel; Future  -     Comprehensive metabolic panel; Future  -     Blood pressure is elevated, resume medication    Controlled type 2 diabetes mellitus without complication, without long-term current use of insulin  -     Hemoglobin A1c; Future  -     Lipid panel; Future  -     Comprehensive metabolic panel;  Future    Hot flashes  -     Check thyroid level    Other orders  -     blood sugar diagnostic Strp; 1 strip by Misc.(Non-Drug; Combo Route) route once daily.  -     diazePAM (VALIUM) 5 MG tablet; Take 1 tablet (5 mg total) by mouth nightly as needed.  -     metFORMIN (GLUCOPHAGE) 500 MG tablet; Take 1 tablet (500 mg total) by mouth daily with breakfast.

## 2017-11-07 ENCOUNTER — TELEPHONE (OUTPATIENT)
Dept: INTERNAL MEDICINE | Facility: CLINIC | Age: 72
End: 2017-11-07

## 2017-11-07 NOTE — TELEPHONE ENCOUNTER
Please inform patient that hemoglobin A1c is mildly elevated at 6.8.  Diabetes is still well controlled.  However, hemoglobin A1c is increasing.  Encourage healthy diet and regular exercise as tolerated.

## 2017-11-07 NOTE — TELEPHONE ENCOUNTER
Spoke with the patient and advised the patient of the lab results and she understood. Termed the call

## 2017-11-16 RX ORDER — HYDROCHLOROTHIAZIDE 25 MG/1
TABLET ORAL
Qty: 30 TABLET | Refills: 6 | Status: SHIPPED | OUTPATIENT
Start: 2017-11-16 | End: 2018-06-23 | Stop reason: SDUPTHER

## 2017-11-21 ENCOUNTER — TELEPHONE (OUTPATIENT)
Dept: INTERNAL MEDICINE | Facility: CLINIC | Age: 72
End: 2017-11-21

## 2017-11-21 RX ORDER — NIFEDIPINE 90 MG/1
TABLET, EXTENDED RELEASE ORAL
Qty: 30 TABLET | Refills: 3 | Status: SHIPPED | OUTPATIENT
Start: 2017-11-21 | End: 2018-07-28 | Stop reason: SDUPTHER

## 2017-12-06 ENCOUNTER — TELEPHONE (OUTPATIENT)
Dept: INTERNAL MEDICINE | Facility: CLINIC | Age: 72
End: 2017-12-06

## 2017-12-06 NOTE — TELEPHONE ENCOUNTER
----- Message from Ilda Cabrera sent at 12/6/2017  8:52 AM CST -----  Contact: Jane Chung [phone]  Requesting ICD 10 diabetic code for pt. Ok to leave vm it is secured line. Please advise

## 2017-12-06 NOTE — TELEPHONE ENCOUNTER
----- Message from Ilda Cabrera sent at 12/6/2017  8:52 AM CST -----  Contact: Jane Chung [phone]  Requesting ICD 10 diabetic code for pt. Ok to leave vm it is secured line. Please advise

## 2017-12-07 ENCOUNTER — TELEPHONE (OUTPATIENT)
Dept: INTERNAL MEDICINE | Facility: CLINIC | Age: 72
End: 2017-12-07

## 2017-12-07 NOTE — TELEPHONE ENCOUNTER
Sammi called from [facility] and inquired about ICD-10 for DM for the patient gave the code as listed E-11.9 she called from  [phone]. Termed the call

## 2017-12-11 RX ORDER — PANTOPRAZOLE SODIUM 40 MG/1
TABLET, DELAYED RELEASE ORAL
Qty: 90 TABLET | Refills: 3 | Status: SHIPPED | OUTPATIENT
Start: 2017-12-11 | End: 2018-11-30 | Stop reason: SDUPTHER

## 2017-12-18 RX ORDER — TRAZODONE HYDROCHLORIDE 150 MG/1
TABLET ORAL
Qty: 90 TABLET | Refills: 0 | Status: SHIPPED | OUTPATIENT
Start: 2017-12-18 | End: 2018-03-16 | Stop reason: SDUPTHER

## 2018-01-22 ENCOUNTER — OFFICE VISIT (OUTPATIENT)
Dept: OPTOMETRY | Facility: CLINIC | Age: 73
End: 2018-01-22
Payer: MEDICARE

## 2018-01-22 DIAGNOSIS — H50.15 ALTERNATING EXOTROPIA: ICD-10-CM

## 2018-01-22 DIAGNOSIS — H25.13 NUCLEAR SCLEROSIS, BILATERAL: ICD-10-CM

## 2018-01-22 DIAGNOSIS — Z13.5 GLAUCOMA SCREENING: ICD-10-CM

## 2018-01-22 DIAGNOSIS — H52.4 PRESBYOPIA: ICD-10-CM

## 2018-01-22 DIAGNOSIS — E11.9 TYPE 2 DIABETES MELLITUS WITHOUT RETINOPATHY: Primary | ICD-10-CM

## 2018-01-22 PROCEDURE — 92014 COMPRE OPH EXAM EST PT 1/>: CPT | Mod: S$PBB,,, | Performed by: OPTOMETRIST

## 2018-01-22 PROCEDURE — 99212 OFFICE O/P EST SF 10 MIN: CPT | Mod: PBBFAC,PO | Performed by: OPTOMETRIST

## 2018-01-22 PROCEDURE — 92015 DETERMINE REFRACTIVE STATE: CPT | Mod: ,,, | Performed by: OPTOMETRIST

## 2018-01-22 PROCEDURE — 99999 PR PBB SHADOW E&M-EST. PATIENT-LVL II: CPT | Mod: PBBFAC,,, | Performed by: OPTOMETRIST

## 2018-01-22 NOTE — PROGRESS NOTES
HPI     DLS: 5/24/2017 with Dr. Juárez--wishes second opinion on cats urgery  Pt states very little change in va since last visit.   Occasional floaters. Denies flashes of light     No gtts     DM   CAT OU     LBS=   Hemoglobin A1C       Date                     Value               Ref Range             Status                11/06/2017               6.8 (H)             4.0 - 5.6 %           Final                 08/08/2017               6.6 (H)             4.0 - 5.6 %           Final                 12/07/2016               6.1                 4.5 - 6.2 %           Final             ----------      Last edited by Juan Blanchard, OD on 1/22/2018  3:48 PM. (History)        ROS     Positive for: Endocrine (DM)    Negative for: Constitutional, Gastrointestinal, Neurological, Skin,   Genitourinary, Musculoskeletal, HENT, Cardiovascular, Eyes, Respiratory,   Psychiatric, Allergic/Imm, Heme/Lymph    Last edited by Juan Blanchard, OD on 1/22/2018  3:48 PM. (History)        Assessment /Plan     For exam results, see Encounter Report.    Type 2 diabetes mellitus without retinopathy    Nuclear sclerosis, bilateral    Alternating exotropia    Glaucoma screening    Presbyopia        1. AXT   2. Cat ou--pt went to [facility] for cat eval, but felt she had to get specialty toric IOLs, which she couldn't afford.  Discussed w pt it is Ok to get regular monofocal implans--she would just need to wear spex afterwards.  Pt says she is fine with that, and wishes second opinion about surgery   3. DM- WITHOUT RETINOPATHY.  Advised yearly DFE        Plan:     Surgical consult-- Dr Knowles

## 2018-01-25 RX ORDER — FLUOXETINE HYDROCHLORIDE 40 MG/1
CAPSULE ORAL
Qty: 30 CAPSULE | Refills: 0 | Status: SHIPPED | OUTPATIENT
Start: 2018-01-25 | End: 2018-01-25 | Stop reason: SDUPTHER

## 2018-01-26 RX ORDER — FLUOXETINE HYDROCHLORIDE 40 MG/1
CAPSULE ORAL
Qty: 90 CAPSULE | Refills: 0 | Status: SHIPPED | OUTPATIENT
Start: 2018-01-26 | End: 2018-05-07 | Stop reason: SDUPTHER

## 2018-02-01 RX ORDER — DIAZEPAM 5 MG/1
TABLET ORAL
Qty: 30 TABLET | Refills: 0 | Status: SHIPPED | OUTPATIENT
Start: 2018-02-01 | End: 2018-05-07 | Stop reason: SDUPTHER

## 2018-02-26 RX ORDER — METFORMIN HYDROCHLORIDE 500 MG/1
TABLET ORAL
Qty: 60 TABLET | Refills: 3 | Status: SHIPPED | OUTPATIENT
Start: 2018-02-26 | End: 2018-06-30 | Stop reason: SDUPTHER

## 2018-02-26 RX ORDER — LOVASTATIN 40 MG/1
TABLET ORAL
Qty: 30 TABLET | Refills: 3 | Status: SHIPPED | OUTPATIENT
Start: 2018-02-26 | End: 2018-06-30 | Stop reason: SDUPTHER

## 2018-02-26 RX ORDER — VALSARTAN 160 MG/1
TABLET ORAL
Qty: 30 TABLET | Refills: 3 | Status: SHIPPED | OUTPATIENT
Start: 2018-02-26 | End: 2018-06-30 | Stop reason: SDUPTHER

## 2018-03-18 RX ORDER — TRAZODONE HYDROCHLORIDE 150 MG/1
TABLET ORAL
Qty: 90 TABLET | Refills: 0 | Status: SHIPPED | OUTPATIENT
Start: 2018-03-18 | End: 2018-06-15 | Stop reason: SDUPTHER

## 2018-04-09 ENCOUNTER — OFFICE VISIT (OUTPATIENT)
Dept: OPHTHALMOLOGY | Facility: CLINIC | Age: 73
End: 2018-04-09
Payer: MEDICARE

## 2018-04-09 DIAGNOSIS — I10 ESSENTIAL HYPERTENSION: ICD-10-CM

## 2018-04-09 DIAGNOSIS — H25.13 NUCLEAR SCLEROSIS, BILATERAL: Primary | ICD-10-CM

## 2018-04-09 DIAGNOSIS — H52.7 REFRACTIVE ERROR: ICD-10-CM

## 2018-04-09 DIAGNOSIS — H50.15 ALTERNATING EXOTROPIA: ICD-10-CM

## 2018-04-09 DIAGNOSIS — E11.9 DM TYPE 2 WITHOUT RETINOPATHY: ICD-10-CM

## 2018-04-09 PROCEDURE — 99212 OFFICE O/P EST SF 10 MIN: CPT | Mod: PBBFAC,PO,25 | Performed by: OPHTHALMOLOGY

## 2018-04-09 PROCEDURE — 92014 COMPRE OPH EXAM EST PT 1/>: CPT | Mod: S$PBB,,, | Performed by: OPHTHALMOLOGY

## 2018-04-09 PROCEDURE — 92136 OPHTHALMIC BIOMETRY: CPT | Mod: PBBFAC,PO | Performed by: OPHTHALMOLOGY

## 2018-04-09 PROCEDURE — 99999 PR PBB SHADOW E&M-EST. PATIENT-LVL II: CPT | Mod: PBBFAC,,, | Performed by: OPHTHALMOLOGY

## 2018-04-09 RX ORDER — DIFLUPREDNATE OPHTHALMIC 0.5 MG/ML
1 EMULSION OPHTHALMIC 4 TIMES DAILY
Qty: 5 ML | Refills: 1 | Status: SHIPPED | OUTPATIENT
Start: 2018-04-24 | End: 2018-05-24

## 2018-04-09 RX ORDER — NEPAFENAC 3 MG/ML
1 SUSPENSION/ DROPS OPHTHALMIC DAILY
Qty: 3 ML | Refills: 1 | Status: SHIPPED | OUTPATIENT
Start: 2018-04-21 | End: 2018-05-21

## 2018-04-09 RX ORDER — OFLOXACIN 3 MG/ML
1 SOLUTION/ DROPS OPHTHALMIC 4 TIMES DAILY
Qty: 5 ML | Refills: 1 | Status: SHIPPED | OUTPATIENT
Start: 2018-04-21 | End: 2018-05-01

## 2018-04-10 ENCOUNTER — TELEPHONE (OUTPATIENT)
Dept: OPHTHALMOLOGY | Facility: CLINIC | Age: 73
End: 2018-04-10

## 2018-04-10 DIAGNOSIS — H25.12 NUCLEAR SCLEROSIS, LEFT: Primary | ICD-10-CM

## 2018-04-12 ENCOUNTER — TELEPHONE (OUTPATIENT)
Dept: OPHTHALMOLOGY | Facility: CLINIC | Age: 73
End: 2018-04-12

## 2018-05-07 RX ORDER — DIAZEPAM 5 MG/1
TABLET ORAL
Qty: 30 TABLET | Refills: 3 | Status: SHIPPED | OUTPATIENT
Start: 2018-05-07 | End: 2018-11-06 | Stop reason: SDUPTHER

## 2018-05-07 RX ORDER — FLUOXETINE HYDROCHLORIDE 40 MG/1
CAPSULE ORAL
Qty: 90 CAPSULE | Refills: 3 | Status: SHIPPED | OUTPATIENT
Start: 2018-05-07 | End: 2019-05-17 | Stop reason: SDUPTHER

## 2018-05-21 RX ORDER — NIFEDIPINE 90 MG/1
TABLET, EXTENDED RELEASE ORAL
Qty: 30 TABLET | Refills: 3 | Status: SHIPPED | OUTPATIENT
Start: 2018-05-21 | End: 2018-06-04 | Stop reason: SDUPTHER

## 2018-05-30 ENCOUNTER — TELEPHONE (OUTPATIENT)
Dept: OPHTHALMOLOGY | Facility: CLINIC | Age: 73
End: 2018-05-30

## 2018-06-03 NOTE — H&P
[facility]  History & Physical    Subjective:      Chief Complaint/Reason for Admission:     Val Bragg is a 72 y.o. female.    Past Medical History:   Diagnosis Date    Alternating exotropia 4/18/2013    Bilateral cataracts     Depression     Diabetes mellitus     GERD (gastroesophageal reflux disease)     Hyperlipidemia     Hypertension     Menopausal state     Multiple gastric polyps     on EGD in 2004    Sickle cell trait      Past Surgical History:   Procedure Laterality Date    CHOLECYSTECTOMY      laproscopic cholecystectomy    HYSTERECTOMY      DENISHA/BSO - fibroids    Laparoscopic right hemicolectomy      OOPHORECTOMY       Family History   Problem Relation Age of Onset    Diabetes Mother     Stroke Mother     Hypertension Father     Heart disease Father     Hypertension Sister     Diabetes Sister     Cataracts Sister     Stroke Sister     Hypertension Brother     Cancer Brother         bladder    Cataracts Brother     Stroke Brother     Diabetes Paternal Aunt     Stroke Sister     Breast cancer Neg Hx     Colon cancer Neg Hx     Ovarian cancer Neg Hx     Melanoma Neg Hx     Amblyopia Neg Hx     Blindness Neg Hx     Glaucoma Neg Hx     Macular degeneration Neg Hx     Retinal detachment Neg Hx     Strabismus Neg Hx     Thyroid disease Neg Hx      Social History   Substance Use Topics    Smoking status: Never Smoker    Smokeless tobacco: Never Used    Alcohol use No       No prescriptions prior to admission.     Review of patient's allergies indicates:   Allergen Reactions    Darvocet a500  [propoxyphene n-acetaminophen]      Other reaction(s): Anaphylaxis        Review of Systems   Eyes: Positive for blurred vision.   All other systems reviewed and are negative.      Objective:      Vital Signs (Most Recent)       Vital Signs Range (Last 24H):       Physical Exam   Constitutional: She is oriented to person, place, and time. She appears  well-developed and well-nourished.   HENT:   Head: Normocephalic.   Eyes: Conjunctivae and EOM are normal. Pupils are equal, round, and reactive to light.   Neck: Normal range of motion. Neck supple.   Cardiovascular: Normal rate, regular rhythm and normal heart sounds.    Pulmonary/Chest: Effort normal and breath sounds normal.   Abdominal: Soft. Bowel sounds are normal.   Musculoskeletal: Normal range of motion.   Neurological: She is alert and oriented to person, place, and time.   Skin: Skin is warm.   Psychiatric: She has a normal mood and affect.       Data Review:    ECG:     Assessment:      Cataract OS.    Plan:    CE OS.

## 2018-06-05 ENCOUNTER — ANESTHESIA EVENT (OUTPATIENT)
Dept: SURGERY | Facility: HOSPITAL | Age: 73
End: 2018-06-05
Payer: MEDICARE

## 2018-06-05 ENCOUNTER — ANESTHESIA (OUTPATIENT)
Dept: SURGERY | Facility: HOSPITAL | Age: 73
End: 2018-06-05
Payer: MEDICARE

## 2018-06-05 ENCOUNTER — HOSPITAL ENCOUNTER (OUTPATIENT)
Facility: HOSPITAL | Age: 73
Discharge: HOME | End: 2018-06-05
Attending: OPHTHALMOLOGY | Admitting: OPHTHALMOLOGY
Payer: MEDICARE

## 2018-06-05 VITALS
TEMPERATURE: 98 F | HEIGHT: 66 IN | OXYGEN SATURATION: 97 % | SYSTOLIC BLOOD PRESSURE: 155 MMHG | WEIGHT: 183 LBS | HEART RATE: 80 BPM | RESPIRATION RATE: 16 BRPM | DIASTOLIC BLOOD PRESSURE: 58 MMHG | BODY MASS INDEX: 29.41 KG/M2

## 2018-06-05 DIAGNOSIS — H25.10 SENILE NUCLEAR SCLEROSIS: ICD-10-CM

## 2018-06-05 LAB
POCT GLUCOSE: 174 MG/DL (ref 70–110)
POCT GLUCOSE: 199 MG/DL (ref 70–110)

## 2018-06-05 PROCEDURE — 25000003 PHARM REV CODE 250: Performed by: OPHTHALMOLOGY

## 2018-06-05 PROCEDURE — 36000706: Performed by: OPHTHALMOLOGY

## 2018-06-05 PROCEDURE — D9220A PRA ANESTHESIA: Mod: CRNA,,, | Performed by: NURSE ANESTHETIST, CERTIFIED REGISTERED

## 2018-06-05 PROCEDURE — V2632 POST CHMBR INTRAOCULAR LENS: HCPCS | Performed by: OPHTHALMOLOGY

## 2018-06-05 PROCEDURE — 71000015 HC POSTOP RECOV 1ST HR: Performed by: OPHTHALMOLOGY

## 2018-06-05 PROCEDURE — D9220A PRA ANESTHESIA: Mod: ANES,,, | Performed by: ANESTHESIOLOGY

## 2018-06-05 PROCEDURE — 66984 XCAPSL CTRC RMVL W/O ECP: CPT | Mod: LT,,, | Performed by: OPHTHALMOLOGY

## 2018-06-05 PROCEDURE — 37000009 HC ANESTHESIA EA ADD 15 MINS: Performed by: OPHTHALMOLOGY

## 2018-06-05 PROCEDURE — 63600175 PHARM REV CODE 636 W HCPCS: Performed by: OPHTHALMOLOGY

## 2018-06-05 PROCEDURE — 36000707: Performed by: OPHTHALMOLOGY

## 2018-06-05 PROCEDURE — 82962 GLUCOSE BLOOD TEST: CPT | Mod: 91 | Performed by: OPHTHALMOLOGY

## 2018-06-05 PROCEDURE — 37000008 HC ANESTHESIA 1ST 15 MINUTES: Performed by: OPHTHALMOLOGY

## 2018-06-05 PROCEDURE — 63600175 PHARM REV CODE 636 W HCPCS: Performed by: NURSE ANESTHETIST, CERTIFIED REGISTERED

## 2018-06-05 PROCEDURE — 25000003 PHARM REV CODE 250

## 2018-06-05 PROCEDURE — 82962 GLUCOSE BLOOD TEST: CPT | Performed by: OPHTHALMOLOGY

## 2018-06-05 DEVICE — LENS 20.0: Type: IMPLANTABLE DEVICE | Site: EYE | Status: FUNCTIONAL

## 2018-06-05 RX ORDER — EPINEPHRINE 1 MG/ML
INJECTION, SOLUTION INTRACARDIAC; INTRAMUSCULAR; INTRAVENOUS; SUBCUTANEOUS
Status: DISCONTINUED | OUTPATIENT
Start: 2018-06-05 | End: 2018-06-05 | Stop reason: HOSPADM

## 2018-06-05 RX ORDER — PROPARACAINE HYDROCHLORIDE 5 MG/ML
SOLUTION/ DROPS OPHTHALMIC
Status: COMPLETED
Start: 2018-06-05 | End: 2018-06-05

## 2018-06-05 RX ORDER — FENTANYL CITRATE 50 UG/ML
INJECTION, SOLUTION INTRAMUSCULAR; INTRAVENOUS
Status: DISCONTINUED | OUTPATIENT
Start: 2018-06-05 | End: 2018-06-05

## 2018-06-05 RX ORDER — ACETAMINOPHEN 325 MG/1
650 TABLET ORAL EVERY 4 HOURS PRN
Status: DISCONTINUED | OUTPATIENT
Start: 2018-06-05 | End: 2018-06-05 | Stop reason: HOSPADM

## 2018-06-05 RX ORDER — CYCLOPENTOLATE HYDROCHLORIDE 10 MG/ML
1 SOLUTION/ DROPS OPHTHALMIC
Status: DISCONTINUED | OUTPATIENT
Start: 2018-06-05 | End: 2018-06-05 | Stop reason: HOSPADM

## 2018-06-05 RX ORDER — PROPARACAINE HYDROCHLORIDE 5 MG/ML
1 SOLUTION/ DROPS OPHTHALMIC
Status: DISCONTINUED | OUTPATIENT
Start: 2018-06-05 | End: 2018-06-05 | Stop reason: HOSPADM

## 2018-06-05 RX ORDER — OFLOXACIN 3 MG/ML
1 SOLUTION/ DROPS OPHTHALMIC
Status: COMPLETED | OUTPATIENT
Start: 2018-06-05 | End: 2018-06-05

## 2018-06-05 RX ORDER — PHENYLEPHRINE HYDROCHLORIDE 25 MG/ML
1 SOLUTION/ DROPS OPHTHALMIC
Status: DISCONTINUED | OUTPATIENT
Start: 2018-06-05 | End: 2018-06-05 | Stop reason: HOSPADM

## 2018-06-05 RX ORDER — OFLOXACIN 3 MG/ML
SOLUTION/ DROPS OPHTHALMIC
Status: DISCONTINUED | OUTPATIENT
Start: 2018-06-05 | End: 2018-06-05 | Stop reason: HOSPADM

## 2018-06-05 RX ORDER — LIDOCAINE HYDROCHLORIDE 40 MG/ML
INJECTION, SOLUTION RETROBULBAR
Status: DISCONTINUED
Start: 2018-06-05 | End: 2018-06-05 | Stop reason: HOSPADM

## 2018-06-05 RX ORDER — LORAZEPAM 2 MG/ML
0.25 INJECTION INTRAMUSCULAR ONCE AS NEEDED
Status: DISCONTINUED | OUTPATIENT
Start: 2018-06-05 | End: 2018-06-05 | Stop reason: HOSPADM

## 2018-06-05 RX ORDER — HYDROMORPHONE HYDROCHLORIDE 1 MG/ML
0.2 INJECTION, SOLUTION INTRAMUSCULAR; INTRAVENOUS; SUBCUTANEOUS EVERY 5 MIN PRN
Status: DISCONTINUED | OUTPATIENT
Start: 2018-06-05 | End: 2018-06-05 | Stop reason: HOSPADM

## 2018-06-05 RX ORDER — PREDNISOLONE ACETATE 10 MG/ML
SUSPENSION/ DROPS OPHTHALMIC
Status: DISCONTINUED | OUTPATIENT
Start: 2018-06-05 | End: 2018-06-05 | Stop reason: HOSPADM

## 2018-06-05 RX ORDER — OFLOXACIN 3 MG/ML
SOLUTION/ DROPS OPHTHALMIC
Status: COMPLETED
Start: 2018-06-05 | End: 2018-06-05

## 2018-06-05 RX ORDER — LIDOCAINE HYDROCHLORIDE 10 MG/ML
1 INJECTION, SOLUTION EPIDURAL; INFILTRATION; INTRACAUDAL; PERINEURAL ONCE
Status: COMPLETED | OUTPATIENT
Start: 2018-06-05 | End: 2018-06-05

## 2018-06-05 RX ORDER — PHENYLEPHRINE HYDROCHLORIDE 25 MG/ML
SOLUTION/ DROPS OPHTHALMIC
Status: COMPLETED
Start: 2018-06-05 | End: 2018-06-05

## 2018-06-05 RX ORDER — SODIUM CHLORIDE 9 MG/ML
INJECTION, SOLUTION INTRAVENOUS CONTINUOUS
Status: DISCONTINUED | OUTPATIENT
Start: 2018-06-05 | End: 2018-06-05 | Stop reason: HOSPADM

## 2018-06-05 RX ORDER — LIDOCAINE HYDROCHLORIDE 40 MG/ML
INJECTION, SOLUTION RETROBULBAR
Status: DISCONTINUED | OUTPATIENT
Start: 2018-06-05 | End: 2018-06-05 | Stop reason: HOSPADM

## 2018-06-05 RX ORDER — TROPICAMIDE 10 MG/ML
SOLUTION/ DROPS OPHTHALMIC
Status: COMPLETED
Start: 2018-06-05 | End: 2018-06-05

## 2018-06-05 RX ORDER — HYDROCODONE BITARTRATE AND ACETAMINOPHEN 5; 325 MG/1; MG/1
1 TABLET ORAL EVERY 4 HOURS PRN
Status: DISCONTINUED | OUTPATIENT
Start: 2018-06-05 | End: 2018-06-05

## 2018-06-05 RX ORDER — MIDAZOLAM HYDROCHLORIDE 1 MG/ML
INJECTION INTRAMUSCULAR; INTRAVENOUS
Status: DISCONTINUED | OUTPATIENT
Start: 2018-06-05 | End: 2018-06-05

## 2018-06-05 RX ORDER — CYCLOPENTOLATE HYDROCHLORIDE 10 MG/ML
SOLUTION/ DROPS OPHTHALMIC
Status: COMPLETED
Start: 2018-06-05 | End: 2018-06-05

## 2018-06-05 RX ORDER — LIDOCAINE HYDROCHLORIDE 10 MG/ML
INJECTION, SOLUTION EPIDURAL; INFILTRATION; INTRACAUDAL; PERINEURAL
Status: COMPLETED
Start: 2018-06-05 | End: 2018-06-05

## 2018-06-05 RX ORDER — SODIUM CHLORIDE 0.9 % (FLUSH) 0.9 %
3 SYRINGE (ML) INJECTION
Status: DISCONTINUED | OUTPATIENT
Start: 2018-06-05 | End: 2018-06-05 | Stop reason: HOSPADM

## 2018-06-05 RX ORDER — EPINEPHRINE 1 MG/ML
INJECTION, SOLUTION INTRACARDIAC; INTRAMUSCULAR; INTRAVENOUS; SUBCUTANEOUS
Status: DISCONTINUED
Start: 2018-06-05 | End: 2018-06-05 | Stop reason: HOSPADM

## 2018-06-05 RX ORDER — TROPICAMIDE 10 MG/ML
1 SOLUTION/ DROPS OPHTHALMIC
Status: DISCONTINUED | OUTPATIENT
Start: 2018-06-05 | End: 2018-06-05 | Stop reason: HOSPADM

## 2018-06-05 RX ORDER — PREDNISOLONE ACETATE 10 MG/ML
SUSPENSION/ DROPS OPHTHALMIC
Status: DISCONTINUED
Start: 2018-06-05 | End: 2018-06-05 | Stop reason: HOSPADM

## 2018-06-05 RX ADMIN — TROPICAMIDE 1 DROP: 10 SOLUTION/ DROPS OPHTHALMIC at 05:06

## 2018-06-05 RX ADMIN — OFLOXACIN 1 DROP: 3 SOLUTION/ DROPS OPHTHALMIC at 05:06

## 2018-06-05 RX ADMIN — LIDOCAINE HYDROCHLORIDE 0.2 MG: 10 INJECTION, SOLUTION EPIDURAL; INFILTRATION; INTRACAUDAL; PERINEURAL at 06:06

## 2018-06-05 RX ADMIN — CYCLOPENTOLATE HYDROCHLORIDE 1 DROP: 10 SOLUTION/ DROPS OPHTHALMIC at 05:06

## 2018-06-05 RX ADMIN — PHENYLEPHRINE HYDROCHLORIDE 1 DROP: 25 SOLUTION/ DROPS OPHTHALMIC at 06:06

## 2018-06-05 RX ADMIN — PHENYLEPHRINE HYDROCHLORIDE 1 DROP: 25 SOLUTION/ DROPS OPHTHALMIC at 05:06

## 2018-06-05 RX ADMIN — MIDAZOLAM HYDROCHLORIDE 2 MG: 1 INJECTION, SOLUTION INTRAMUSCULAR; INTRAVENOUS at 06:06

## 2018-06-05 RX ADMIN — FENTANYL CITRATE 50 MCG: 50 INJECTION, SOLUTION INTRAMUSCULAR; INTRAVENOUS at 07:06

## 2018-06-05 RX ADMIN — SODIUM CHLORIDE 1000 ML: 0.9 INJECTION, SOLUTION INTRAVENOUS at 06:06

## 2018-06-05 RX ADMIN — CYCLOPENTOLATE HYDROCHLORIDE 1 DROP: 10 SOLUTION/ DROPS OPHTHALMIC at 06:06

## 2018-06-05 RX ADMIN — PROPARACAINE HYDROCHLORIDE 1 DROP: 5 SOLUTION/ DROPS OPHTHALMIC at 05:06

## 2018-06-05 RX ADMIN — FENTANYL CITRATE 25 MCG: 50 INJECTION, SOLUTION INTRAMUSCULAR; INTRAVENOUS at 07:06

## 2018-06-05 RX ADMIN — OFLOXACIN 1 DROP: 3 SOLUTION/ DROPS OPHTHALMIC at 06:06

## 2018-06-05 RX ADMIN — TROPICAMIDE 1 DROP: 10 SOLUTION/ DROPS OPHTHALMIC at 06:06

## 2018-06-05 RX ADMIN — OFLOXACIN 1 DROP: 3 SOLUTION OPHTHALMIC at 05:06

## 2018-06-05 NOTE — TRANSFER OF CARE
"Anesthesia Transfer of Care Note    Patient: Val Bragg    Procedure(s) Performed: Procedure(s) (LRB):  PHACOEMULSIFICATION-ASPIRATION-CATARACT (Left)  INSERTION-INTRAOCULAR LENS (IOL) (Left)    Patient location: PACU    Anesthesia Type: MAC    Transport from OR: Transported from OR on room air with adequate spontaneous ventilation    Post pain: adequate analgesia    Post assessment: no apparent anesthetic complications and tolerated procedure well    Post vital signs: stable    Level of consciousness: awake    Nausea/Vomiting: no nausea/vomiting    Transfer of care protocol was followed      Last vitals:   Visit Vitals  BP (!) 148/75   Pulse 87   Temp 36.7 °C (98.1 °F)   Resp 16   Ht 5' 6" (1.676 m)   Wt 83 kg (183 lb)   SpO2 100%   Breastfeeding? No   BMI 29.54 kg/m²     "

## 2018-06-05 NOTE — BRIEF OP NOTE
Operative Note     SUMMARY     Surgery Date: 6/5/2018    Surgeon(s) and Role:      Ga Castillo MD - Primary    Pre-op Diagnosis:  Nuclear sclerosis     Post-op/ Diagnosis:  Same    Final Diagnosis: Cataract    Procedure(s) (LRB):  PHACOEMULSIFICATION-ASPIRATION-CATARACT   INSERTION-INTRAOCULAR LENS (IOL)     Anesthesia: Monitored Anesthesia Care    Findings/Key Components:  Cataract    Outcome: Tolerated procedure well    Estimated Blood Loss: None         Specimens     None          Follow-up:  Tomorrow in clinic      Discharge Note      SUMMARY     Admit Date: 6/5/2018    Attending Physician: Ga Castillo MD    Discharge Physician: Ga Castillo MD    Discharge Date: 6/5/2018    Final Diagnosis: Cataract    Outcome: Tolerated procedure well    Disposition: Discharge to Home.    Medications:       Val Bragg   Home Medication Instructions NELSON:[id number]    Printed on:06/05/18 0743   Medication Information                      blood sugar diagnostic Strp  1 strip by Misc.(Non-Drug; Combo Route) route once daily.             diazePAM (VALIUM) 5 MG tablet  TAKE 1 TABLET BY MOUTH EVERY NIGHT AT BEDTIME AS NEEDED             FLUoxetine (PROZAC) 40 MG capsule  TAKE 1 CAPSULE BY MOUTH EVERY DAY             hydroCHLOROthiazide (HYDRODIURIL) 25 MG tablet  TAKE 1 TABLET BY MOUTH EVERY DAY             lovastatin (MEVACOR) 40 MG tablet  TAKE 1 TABLET BY MOUTH EVERY NIGHT AT BEDTIME             metFORMIN (GLUCOPHAGE) 500 MG tablet  TAKE 1 TABLET(500 MG) BY MOUTH TWICE DAILY             mometasone (NASONEX) 50 mcg/actuation nasal spray  2 sprays by Nasal route every evening.             NIFEdipine (PROCARDIA-XL) 90 MG (OSM) 24 hr tablet  TAKE 1 TABLET BY MOUTH EVERY DAY             pantoprazole (PROTONIX) 40 MG tablet  TAKE 1 TABLET BY MOUTH ONCE DAILY             polycarbophil (FIBERCON) 625 mg tablet  Take 625 mg by mouth once daily.             traZODone (DESYREL) 150 MG tablet  TAKE 1  TABLET BY MOUTH EVERY NIGHT AT BEDTIME             valsartan (DIOVAN) 160 MG tablet  TAKE 1 TABLET BY MOUTH ONCE DAILY                   Patient Discharge Instructions:     Keep Maurice Shield over operated eye when not using drops.     DIET:  Regular    Activity: No heavy lifting or bending X 1 week.    Follow-up:  Tomorrow in clinic

## 2018-06-05 NOTE — ANESTHESIA POSTPROCEDURE EVALUATION
"Anesthesia Post Evaluation    Patient: Val Bragg    Procedure(s) Performed: Procedure(s) (LRB):  PHACOEMULSIFICATION-ASPIRATION-CATARACT (Left)  INSERTION-INTRAOCULAR LENS (IOL) (Left)    Final Anesthesia Type: MAC  Patient location during evaluation: PACU  Patient participation: Yes- Able to Participate  Level of consciousness: awake and alert  Post-procedure vital signs: reviewed and stable  Pain management: adequate  Airway patency: patent  PONV status at discharge: No PONV  Anesthetic complications: no      Cardiovascular status: blood pressure returned to baseline and stable  Respiratory status: unassisted  Hydration status: euvolemic  Follow-up not needed.        Visit Vitals  BP (!) 155/58   Pulse 80   Temp 36.7 °C (98.1 °F)   Resp 16   Ht 5' 6" (1.676 m)   Wt 83 kg (183 lb)   SpO2 97%   Breastfeeding? No   BMI 29.54 kg/m²       Pain/Albin Score: Pain Assessment Performed: Yes (6/5/2018  8:15 AM)  Presence of Pain: denies (6/5/2018  8:15 AM)  Albin Score: 10 (6/5/2018  7:45 AM)      "

## 2018-06-05 NOTE — ANESTHESIA PREPROCEDURE EVALUATION
06/05/2018  Val Bragg is a 72 y.o., female.    Anesthesia Evaluation    I have reviewed the Patient Summary Reports.        Review of Systems  Anesthesia Hx:  No problems with previous Anesthesia    Social:  Non-Smoker    Hematology/Oncology:  Hematology Normal   Oncology Normal     EENT/Dental:EENT/Dental Normal   Cardiovascular:   Hypertension    Pulmonary:  Pulmonary Normal    Renal/:  Renal/ Normal     Hepatic/GI:   GERD    Musculoskeletal:  Musculoskeletal Normal    Neurological:  Neurology Normal    Endocrine:   Diabetes, type 2    Dermatological:  Skin Normal    Psych:   Psychiatric History          Physical Exam  General:  Well nourished    Airway/Jaw/Neck:  Airway Findings: Mouth Opening: Normal Tongue: Normal  General Airway Assessment: Adult  Mallampati: II  Improves to II with phonation.  TM Distance: Normal, at least 6 cm  Jaw/Neck Findings:  Neck ROM: Normal ROM      Dental:  Dental Findings: In tact   Chest/Lungs:  Chest/Lungs Findings: Clear to auscultation, Normal Respiratory Rate     Heart/Vascular:  Heart Findings: Rate: Normal  Rhythm: Regular Rhythm  Sounds: Normal             Anesthesia Plan  Type of Anesthesia, risks & benefits discussed:  Anesthesia Type:  MAC  Patient's Preference: MAC  Intra-op Monitoring Plan:   Intra-op Monitoring Plan Comments:   Post Op Pain Control Plan:   Post Op Pain Control Plan Comments:   Induction:   IV  Beta Blocker:  Patient is not currently on a Beta-Blocker (No further documentation required).       Informed Consent: Patient understands risks and agrees with Anesthesia plan.  Questions answered. Anesthesia consent signed with patient.  ASA Score: 3     Day of Surgery Review of History & Physical: I have interviewed and examined the patient. I have reviewed the patient's H&P dated:  There are no significant changes.          Ready For Surgery  From Anesthesia Perspective.

## 2018-06-06 ENCOUNTER — OFFICE VISIT (OUTPATIENT)
Dept: OPHTHALMOLOGY | Facility: CLINIC | Age: 73
End: 2018-06-06
Payer: MEDICARE

## 2018-06-06 VITALS — HEART RATE: 67 BPM | DIASTOLIC BLOOD PRESSURE: 70 MMHG | SYSTOLIC BLOOD PRESSURE: 163 MMHG

## 2018-06-06 DIAGNOSIS — Z98.890 POST-OPERATIVE STATE: Primary | ICD-10-CM

## 2018-06-06 PROCEDURE — 99024 POSTOP FOLLOW-UP VISIT: CPT | Mod: POP,,, | Performed by: OPHTHALMOLOGY

## 2018-06-06 PROCEDURE — 99213 OFFICE O/P EST LOW 20 MIN: CPT | Mod: PBBFAC,PO | Performed by: OPHTHALMOLOGY

## 2018-06-06 PROCEDURE — 99999 PR PBB SHADOW E&M-EST. PATIENT-LVL III: CPT | Mod: PBBFAC,,, | Performed by: OPHTHALMOLOGY

## 2018-06-06 NOTE — OP NOTE
DATE OF PROCEDURE:  06/05/2018    SURGEON:  Ga Castillo M.D.    PREOPERATIVE DIAGNOSIS:  Nuclear sclerotic cataract, left eye.    POSTOPERATIVE DIAGNOSIS:  Nuclear sclerotic cataract, left eye.    PROCEDURE:  Clear corneal phacoemulsification with posterior chamber intraocular   lens implant, left eye.    ANESTHESIA:  Monitored anesthesia care with 2% Xylocaine jelly topically, 1%   free lidocaine topically and intrachamberly.    PROCEDURE IN DETAIL:  After the appropriate preoperative consent was obtained,   the patient had the 2% Xylocaine jelly applied to the cornea.  The patient was   then brought to the operating room and placed into the supine position.  The   left periorbit was then prepped and draped in the usual sterile ophthalmic   fashion.  A lid speculum was then inserted into the left eye.  Several drops of   the 1% lidocaine were placed onto the left cornea.  The 1% lidocaine was also   applied to the perilimbal region with the Weck-concepcion sponge.  Using the 0.12-mm   forceps and the Super Sharp blade, a paracentesis site was made at the 6 o'clock   position.  Approximately 0.5 mL of the 1% lidocaine was injected into the   anterior chamber.  Next, Viscoat was injected into the anterior chamber through   the paracentesis site.  The 2.75-mm keratome and the cyclodialysis spatula were   used to create a 2.75-mm clear corneal temporal groove.  The cystitomy needle   and Utrata forceps were then used to create a continuous tear 360-degree   capsulorrhexis.  BSS in a cannula was then used for hydrodissection.    Phacoemulsification then proceeded in a stop-and-chop fashion without any   complications.  Another 0.5 mL of the 1% lidocaine was injected into the   anterior chamber.  The curved tip irrigation aspiration handpiece was then used   to remove the residual cortical material from the capsular bag.  Again, the 1%   lidocaine was applied to the perilimbal region with the Weck-concepcion sponge.  Yoel    GV was then injected into the anterior chamber and capsular bag.  An [employer] intraocular lens model SN60WF, 20.0 diopters in power, and serial   [id number] was injected into the capsular bag with the lens injector.  The   Sinskey hook was used to position the lens into its proper place.  The residual   viscoelastic material was removed from the anterior chamber and capsular bag   with the curved tip irrigation aspiration handpiece.  Both wounds were hydrated   with BSS on a cannula.  Both wounds were checked and found to be watertight.    The lid speculum was then removed from the left eye.  The patient then had 1   drop of Vigamox ophthalmic drop and 1 drop of Econopred +1% ophthalmic drop   instilled onto the left cornea.  The eye was then shielded.  The patient   tolerated the procedure well and was then brought to the recovery room in good   condition.      WINSTON  dd: 06/05/2018 17:24:43 (CDT)  td: 06/05/2018 22:00:03 (CDT)  Doc ID   #[id number]  Job ID #[id number]    CC:

## 2018-06-06 NOTE — PROGRESS NOTES
Subjective:       Patient ID: Val Bragg is a 72 y.o. female.    Chief Complaint: Post-op Evaluation (1 day po os)    HPI     Post-op Evaluation    Additional comments: 1 day po os           Comments   S/p Phaco w/IOL OS- 6/5/18    1 day po os      Pt states sx went well. Pt denies pain and discomfort.     Eyemeds  Olfoxacin QID OS  Durezol QID OS  Ilevro QD OS        Last edited by Ila Fitch on 6/6/2018  1:41 PM. (History)             Assessment:       1. Post-operative state        Plan:       S/p CE OS- Doing well.           CPM OS.  RTC 1 wk.

## 2018-06-08 DIAGNOSIS — E11.9 TYPE 2 DIABETES MELLITUS WITHOUT COMPLICATION: ICD-10-CM

## 2018-06-13 ENCOUNTER — OFFICE VISIT (OUTPATIENT)
Dept: OPHTHALMOLOGY | Facility: CLINIC | Age: 73
End: 2018-06-13
Payer: MEDICARE

## 2018-06-13 DIAGNOSIS — Z98.890 POST-OPERATIVE STATE: Primary | ICD-10-CM

## 2018-06-13 PROCEDURE — 99999 PR PBB SHADOW E&M-EST. PATIENT-LVL II: CPT | Mod: PBBFAC,,, | Performed by: OPHTHALMOLOGY

## 2018-06-13 PROCEDURE — 99024 POSTOP FOLLOW-UP VISIT: CPT | Mod: POP,,, | Performed by: OPHTHALMOLOGY

## 2018-06-13 PROCEDURE — 99212 OFFICE O/P EST SF 10 MIN: CPT | Mod: PBBFAC,PO | Performed by: OPHTHALMOLOGY

## 2018-06-13 NOTE — PROGRESS NOTES
Subjective:       Patient ID: Val Bragg is a 72 y.o. female.    Chief Complaint: Post-op Evaluation (1 week PO OS)    HPI     Post-op Evaluation    Additional comments: 1 week PO OS           Comments   72 y.o. Female is here for 1 week Post op left eye. Denies eye pain and   flashes. H/o floaters. No blurred vision left eye.     Meds: Durezol QID OS                 Last edited by KYRIE Coffey on 6/13/2018  1:01 PM. (History)             Assessment:       1. Post-operative state        Plan:       S/p CE OS- Doing well.           Taper gtts OS.  RTC 3 wks.

## 2018-06-15 RX ORDER — TRAZODONE HYDROCHLORIDE 150 MG/1
TABLET ORAL
Qty: 90 TABLET | Refills: 0 | Status: SHIPPED | OUTPATIENT
Start: 2018-06-15 | End: 2018-09-11 | Stop reason: SDUPTHER

## 2018-06-25 RX ORDER — HYDROCHLOROTHIAZIDE 25 MG/1
TABLET ORAL
Qty: 90 TABLET | Refills: 0 | Status: SHIPPED | OUTPATIENT
Start: 2018-06-25 | End: 2019-10-24 | Stop reason: SDUPTHER

## 2018-07-02 RX ORDER — VALSARTAN 160 MG/1
TABLET ORAL
Qty: 30 TABLET | Refills: 0 | Status: SHIPPED | OUTPATIENT
Start: 2018-07-02 | End: 2018-08-02

## 2018-07-02 RX ORDER — LOVASTATIN 40 MG/1
TABLET ORAL
Qty: 30 TABLET | Refills: 0 | Status: SHIPPED | OUTPATIENT
Start: 2018-07-02 | End: 2018-08-04 | Stop reason: SDUPTHER

## 2018-07-02 RX ORDER — LOVASTATIN 40 MG/1
TABLET ORAL
Qty: 30 TABLET | Refills: 0 | Status: SHIPPED | OUTPATIENT
Start: 2018-07-02 | End: 2019-03-19

## 2018-07-02 RX ORDER — METFORMIN HYDROCHLORIDE 500 MG/1
TABLET ORAL
Qty: 60 TABLET | Refills: 0 | Status: SHIPPED | OUTPATIENT
Start: 2018-07-02 | End: 2018-07-29 | Stop reason: SDUPTHER

## 2018-07-05 ENCOUNTER — TELEPHONE (OUTPATIENT)
Dept: INTERNAL MEDICINE | Facility: CLINIC | Age: 73
End: 2018-07-05

## 2018-07-09 ENCOUNTER — OFFICE VISIT (OUTPATIENT)
Dept: OPHTHALMOLOGY | Facility: CLINIC | Age: 73
End: 2018-07-09
Payer: MEDICARE

## 2018-07-09 DIAGNOSIS — Z98.890 POST-OPERATIVE STATE: Primary | ICD-10-CM

## 2018-07-09 PROCEDURE — 99999 PR PBB SHADOW E&M-EST. PATIENT-LVL II: CPT | Mod: PBBFAC,,, | Performed by: OPHTHALMOLOGY

## 2018-07-09 PROCEDURE — 99024 POSTOP FOLLOW-UP VISIT: CPT | Mod: POP,,, | Performed by: OPHTHALMOLOGY

## 2018-07-09 PROCEDURE — 99212 OFFICE O/P EST SF 10 MIN: CPT | Mod: PBBFAC,PO | Performed by: OPHTHALMOLOGY

## 2018-07-09 NOTE — PROGRESS NOTES
Subjective:       Patient ID: Val Bragg is a 72 y.o. female.    Chief Complaint: Post-op Evaluation (3 week PO OS only )    HPI     Post-op Evaluation    Additional comments: 3 week PO OS only            Comments   72 y.o. Female is here for 3 week post op left eye only. On Saturday   experience eye pain left eye. States that she had to take Aleve for the   pain, no eye pain on today. Denies flashes. H/o floaters bilateral, but   since the surgery notice little specks of floaters left eye. No blurred   vision at distance. Slight light sensitivity on Sunday, but none on today.       Meds: No gtt        Last edited by KYRIE Coffey on 7/9/2018  1:04 PM. (History)             Assessment:       1. Post-operative state        Plan:       S/p CE OS- Doing well but with residual iritis.             PF taper OS.  RTC 4 wks.

## 2018-07-18 ENCOUNTER — TELEPHONE (OUTPATIENT)
Dept: OPHTHALMOLOGY | Facility: CLINIC | Age: 73
End: 2018-07-18

## 2018-07-18 NOTE — TELEPHONE ENCOUNTER
----- Message from Katherine Ortiz sent at 7/18/2018  1:39 PM CDT -----  Contact: Val  Needs Advice    Reason for call:    Pt called to request another Rx for eye drops. Her pet destroyed the the bottle of solution.  Communication Preference:[phone]  Additional Information:

## 2018-07-20 ENCOUNTER — TELEPHONE (OUTPATIENT)
Dept: ENDOSCOPY | Facility: HOSPITAL | Age: 73
End: 2018-07-20

## 2018-07-20 RX ORDER — NIFEDIPINE 90 MG/1
TABLET, EXTENDED RELEASE ORAL
Qty: 90 TABLET | Refills: 3 | Status: SHIPPED | OUTPATIENT
Start: 2018-07-20 | End: 2018-12-28

## 2018-07-20 NOTE — TELEPHONE ENCOUNTER
2nd attempt today:  Called patient to schedule a follow up Colonoscopy ( 3-5 yrs). Left a voicemail message to return my call. Number provided [phone]          7/13/18:  Called patient to schedule a follow up Colonoscopy ( 3-5 yrs). Left a voicemail message to return my call. Number provided [phone]

## 2018-07-23 RX ORDER — HYDROCHLOROTHIAZIDE 25 MG/1
TABLET ORAL
Qty: 30 TABLET | Refills: 0 | Status: SHIPPED | OUTPATIENT
Start: 2018-07-23 | End: 2018-08-19 | Stop reason: SDUPTHER

## 2018-07-26 ENCOUNTER — TELEPHONE (OUTPATIENT)
Dept: ENDOSCOPY | Facility: HOSPITAL | Age: 73
End: 2018-07-26

## 2018-07-26 NOTE — TELEPHONE ENCOUNTER
Multiple attempts to contact patient regarding Repeat Colonoscopy Due. Recall letter/unable to reach letter mailed to patient.

## 2018-07-29 RX ORDER — NIFEDIPINE 90 MG/1
TABLET, EXTENDED RELEASE ORAL
Qty: 30 TABLET | Refills: 0 | Status: SHIPPED | OUTPATIENT
Start: 2018-07-29 | End: 2019-10-24 | Stop reason: SDUPTHER

## 2018-07-29 RX ORDER — NIFEDIPINE 90 MG/1
TABLET, EXTENDED RELEASE ORAL
Qty: 30 TABLET | Refills: 0 | Status: SHIPPED | OUTPATIENT
Start: 2018-07-29 | End: 2018-12-28

## 2018-07-30 RX ORDER — METFORMIN HYDROCHLORIDE 500 MG/1
TABLET ORAL
Qty: 60 TABLET | Refills: 0 | Status: SHIPPED | OUTPATIENT
Start: 2018-07-30 | End: 2018-08-26 | Stop reason: SDUPTHER

## 2018-08-02 RX ORDER — IRBESARTAN 150 MG/1
150 TABLET ORAL NIGHTLY
Qty: 30 TABLET | Refills: 0 | Status: SHIPPED | OUTPATIENT
Start: 2018-08-02 | End: 2018-09-02 | Stop reason: SDUPTHER

## 2018-08-02 RX ORDER — VALSARTAN 160 MG/1
160 TABLET ORAL DAILY
Qty: 30 TABLET | Refills: 0 | Status: CANCELLED | OUTPATIENT
Start: 2018-08-02

## 2018-08-02 NOTE — TELEPHONE ENCOUNTER
They are out of stock on other generic so patient does need another rx at least for now.    thanks jennifer

## 2018-08-02 NOTE — TELEPHONE ENCOUNTER
I left msg at pharmacy of medication substitution.  I also left msg at patient home of change and monitor bp and let us know if any concerns.

## 2018-08-06 RX ORDER — LOVASTATIN 40 MG/1
TABLET ORAL
Qty: 30 TABLET | Refills: 3 | Status: SHIPPED | OUTPATIENT
Start: 2018-08-06 | End: 2018-12-28

## 2018-08-08 ENCOUNTER — OFFICE VISIT (OUTPATIENT)
Dept: OPHTHALMOLOGY | Facility: CLINIC | Age: 73
End: 2018-08-08
Payer: MEDICARE

## 2018-08-08 DIAGNOSIS — H52.7 REFRACTIVE ERROR: ICD-10-CM

## 2018-08-08 DIAGNOSIS — Z98.890 POST-OPERATIVE STATE: Primary | ICD-10-CM

## 2018-08-08 DIAGNOSIS — H25.11 NUCLEAR SCLEROSIS OF RIGHT EYE: ICD-10-CM

## 2018-08-08 PROCEDURE — 99024 POSTOP FOLLOW-UP VISIT: CPT | Mod: POP,,, | Performed by: OPHTHALMOLOGY

## 2018-08-08 PROCEDURE — 99999 PR PBB SHADOW E&M-EST. PATIENT-LVL II: CPT | Mod: PBBFAC,,, | Performed by: OPHTHALMOLOGY

## 2018-08-08 PROCEDURE — 99212 OFFICE O/P EST SF 10 MIN: CPT | Mod: PBBFAC,PO | Performed by: OPHTHALMOLOGY

## 2018-08-08 NOTE — PROGRESS NOTES
Subjective:       Patient ID: Val Bragg is a 72 y.o. female.    Chief Complaint: Post-op Evaluation (Iritis OS)    HPI     Post-op Evaluation    Additional comments: Iritis OS           Comments   DSL- 7/9/18     72 y.o female is here for iritis f/u. Pt denies pain and discomfort. Pt   complete PF regimen. Pt has floaters.     Eyemeds  No gtts       Last edited by Ila Fitch on 8/8/2018  9:15 AM. (History)             Assessment:       1. Post-operative state    2. Nuclear sclerosis of right eye    3. Refractive error        Plan:       S/p CE OS- Doing well with resolved iritis.     Cataract OD- Not visually significant.  RE-Pt wants MRx.        Give MRx.  RTC Dr Blanchard in 6 mos.

## 2018-08-20 RX ORDER — HYDROCHLOROTHIAZIDE 25 MG/1
TABLET ORAL
Qty: 30 TABLET | Refills: 3 | Status: SHIPPED | OUTPATIENT
Start: 2018-08-20 | End: 2018-12-16 | Stop reason: SDUPTHER

## 2018-08-27 RX ORDER — METFORMIN HYDROCHLORIDE 500 MG/1
TABLET ORAL
Qty: 60 TABLET | Refills: 3 | Status: SHIPPED | OUTPATIENT
Start: 2018-08-27 | End: 2019-01-01 | Stop reason: SDUPTHER

## 2018-09-04 RX ORDER — IRBESARTAN 150 MG/1
TABLET ORAL
Qty: 30 TABLET | Refills: 3 | Status: SHIPPED | OUTPATIENT
Start: 2018-09-04 | End: 2018-12-07 | Stop reason: SDUPTHER

## 2018-09-11 RX ORDER — TRAZODONE HYDROCHLORIDE 150 MG/1
TABLET ORAL
Qty: 90 TABLET | Refills: 1 | Status: SHIPPED | OUTPATIENT
Start: 2018-09-11 | End: 2019-03-08 | Stop reason: SDUPTHER

## 2018-11-07 RX ORDER — DIAZEPAM 5 MG/1
TABLET ORAL
Qty: 30 TABLET | Refills: 0 | Status: SHIPPED | OUTPATIENT
Start: 2018-11-07 | End: 2019-01-07 | Stop reason: SDUPTHER

## 2018-11-30 RX ORDER — PANTOPRAZOLE SODIUM 40 MG/1
TABLET, DELAYED RELEASE ORAL
Qty: 90 TABLET | Refills: 0 | Status: SHIPPED | OUTPATIENT
Start: 2018-11-30 | End: 2019-03-02 | Stop reason: SDUPTHER

## 2018-12-07 RX ORDER — IRBESARTAN 150 MG/1
TABLET ORAL
Qty: 30 TABLET | Refills: 3 | Status: SHIPPED | OUTPATIENT
Start: 2018-12-07 | End: 2018-12-28 | Stop reason: SDUPTHER

## 2018-12-17 RX ORDER — HYDROCHLOROTHIAZIDE 25 MG/1
TABLET ORAL
Qty: 30 TABLET | Refills: 0 | Status: SHIPPED | OUTPATIENT
Start: 2018-12-17 | End: 2018-12-28

## 2018-12-28 ENCOUNTER — LAB VISIT (OUTPATIENT)
Dept: LAB | Facility: HOSPITAL | Age: 73
End: 2018-12-28
Attending: INTERNAL MEDICINE
Payer: MEDICARE

## 2018-12-28 ENCOUNTER — OFFICE VISIT (OUTPATIENT)
Dept: INTERNAL MEDICINE | Facility: CLINIC | Age: 73
End: 2018-12-28
Payer: MEDICARE

## 2018-12-28 VITALS
BODY MASS INDEX: 31.73 KG/M2 | DIASTOLIC BLOOD PRESSURE: 89 MMHG | TEMPERATURE: 98 F | HEART RATE: 85 BPM | SYSTOLIC BLOOD PRESSURE: 145 MMHG | WEIGHT: 185.88 LBS | HEIGHT: 64 IN | RESPIRATION RATE: 18 BRPM

## 2018-12-28 DIAGNOSIS — J20.9 ACUTE BRONCHITIS, UNSPECIFIED ORGANISM: ICD-10-CM

## 2018-12-28 DIAGNOSIS — I10 ESSENTIAL HYPERTENSION: Primary | ICD-10-CM

## 2018-12-28 DIAGNOSIS — E11.9 CONTROLLED TYPE 2 DIABETES MELLITUS WITHOUT COMPLICATION, WITHOUT LONG-TERM CURRENT USE OF INSULIN: ICD-10-CM

## 2018-12-28 DIAGNOSIS — I10 ESSENTIAL HYPERTENSION: ICD-10-CM

## 2018-12-28 DIAGNOSIS — Z23 NEED FOR VACCINATION WITH 13-POLYVALENT PNEUMOCOCCAL CONJUGATE VACCINE: ICD-10-CM

## 2018-12-28 DIAGNOSIS — Z12.31 VISIT FOR SCREENING MAMMOGRAM: ICD-10-CM

## 2018-12-28 DIAGNOSIS — Z78.0 POSTMENOPAUSAL STATUS (AGE-RELATED) (NATURAL): ICD-10-CM

## 2018-12-28 LAB
ALBUMIN SERPL BCP-MCNC: 3.3 G/DL
ALP SERPL-CCNC: 88 U/L
ALT SERPL W/O P-5'-P-CCNC: 26 U/L
ANION GAP SERPL CALC-SCNC: 10 MMOL/L
AST SERPL-CCNC: 43 U/L
BASOPHILS # BLD AUTO: 0.03 K/UL
BASOPHILS NFR BLD: 0.4 %
BILIRUB SERPL-MCNC: 0.4 MG/DL
BUN SERPL-MCNC: 11 MG/DL
CALCIUM SERPL-MCNC: 9.9 MG/DL
CHLORIDE SERPL-SCNC: 104 MMOL/L
CHOLEST SERPL-MCNC: 133 MG/DL
CHOLEST/HDLC SERPL: 3.1 {RATIO}
CO2 SERPL-SCNC: 28 MMOL/L
CREAT SERPL-MCNC: 1.3 MG/DL
DIFFERENTIAL METHOD: ABNORMAL
EOSINOPHIL # BLD AUTO: 0.2 K/UL
EOSINOPHIL NFR BLD: 2 %
ERYTHROCYTE [DISTWIDTH] IN BLOOD BY AUTOMATED COUNT: 14.6 %
EST. GFR  (AFRICAN AMERICAN): 46.7 ML/MIN/1.73 M^2
EST. GFR  (NON AFRICAN AMERICAN): 40.5 ML/MIN/1.73 M^2
ESTIMATED AVG GLUCOSE: 163 MG/DL
GLUCOSE SERPL-MCNC: 192 MG/DL
HBA1C MFR BLD HPLC: 7.3 %
HCT VFR BLD AUTO: 38.1 %
HDLC SERPL-MCNC: 43 MG/DL
HDLC SERPL: 32.3 %
HGB BLD-MCNC: 12.3 G/DL
IMM GRANULOCYTES # BLD AUTO: 0.02 K/UL
IMM GRANULOCYTES NFR BLD AUTO: 0.3 %
LDLC SERPL CALC-MCNC: 66.8 MG/DL
LYMPHOCYTES # BLD AUTO: 2.3 K/UL
LYMPHOCYTES NFR BLD: 28.8 %
MCH RBC QN AUTO: 27.2 PG
MCHC RBC AUTO-ENTMCNC: 32.3 G/DL
MCV RBC AUTO: 84 FL
MONOCYTES # BLD AUTO: 0.4 K/UL
MONOCYTES NFR BLD: 4.9 %
NEUTROPHILS # BLD AUTO: 5.1 K/UL
NEUTROPHILS NFR BLD: 63.6 %
NONHDLC SERPL-MCNC: 90 MG/DL
NRBC BLD-RTO: 0 /100 WBC
PLATELET # BLD AUTO: 295 K/UL
PMV BLD AUTO: 11.8 FL
POTASSIUM SERPL-SCNC: 3.4 MMOL/L
PROT SERPL-MCNC: 7.8 G/DL
RBC # BLD AUTO: 4.53 M/UL
SODIUM SERPL-SCNC: 142 MMOL/L
TRIGL SERPL-MCNC: 116 MG/DL
TSH SERPL DL<=0.005 MIU/L-ACNC: 1.25 UIU/ML
WBC # BLD AUTO: 7.99 K/UL

## 2018-12-28 PROCEDURE — 90670 PCV13 VACCINE IM: CPT | Mod: PBBFAC,PO

## 2018-12-28 PROCEDURE — 83036 HEMOGLOBIN GLYCOSYLATED A1C: CPT

## 2018-12-28 PROCEDURE — 99214 OFFICE O/P EST MOD 30 MIN: CPT | Mod: PBBFAC,PO | Performed by: INTERNAL MEDICINE

## 2018-12-28 PROCEDURE — 84443 ASSAY THYROID STIM HORMONE: CPT

## 2018-12-28 PROCEDURE — 99214 OFFICE O/P EST MOD 30 MIN: CPT | Mod: S$PBB,,, | Performed by: INTERNAL MEDICINE

## 2018-12-28 PROCEDURE — 80053 COMPREHEN METABOLIC PANEL: CPT

## 2018-12-28 PROCEDURE — 80061 LIPID PANEL: CPT

## 2018-12-28 PROCEDURE — 85025 COMPLETE CBC W/AUTO DIFF WBC: CPT

## 2018-12-28 PROCEDURE — 36415 COLL VENOUS BLD VENIPUNCTURE: CPT | Mod: PO

## 2018-12-28 PROCEDURE — 99999 PR PBB SHADOW E&M-EST. PATIENT-LVL IV: CPT | Mod: PBBFAC,,, | Performed by: INTERNAL MEDICINE

## 2018-12-28 RX ORDER — DOXYCYCLINE HYCLATE 100 MG
100 TABLET ORAL 2 TIMES DAILY
Qty: 20 TABLET | Refills: 0 | Status: SHIPPED | OUTPATIENT
Start: 2018-12-28 | End: 2019-03-19

## 2018-12-28 RX ORDER — BENZONATATE 100 MG/1
100 CAPSULE ORAL 3 TIMES DAILY PRN
Qty: 30 CAPSULE | Refills: 0 | Status: SHIPPED | OUTPATIENT
Start: 2018-12-28 | End: 2019-01-07

## 2018-12-28 RX ORDER — IRBESARTAN 150 MG/1
TABLET ORAL
Qty: 90 TABLET | Refills: 3 | Status: SHIPPED | OUTPATIENT
Start: 2018-12-28 | End: 2019-10-24 | Stop reason: SDUPTHER

## 2018-12-28 NOTE — PROGRESS NOTES
CC: Annual review of chronic medical problems  HPI:  The patient is a 73 y.o. old female who presents to the office for annual review of chronic medical problems.  She has not taken her medication yet today.    PAST MEDICAL HISTORY  Past Medical History:   Diagnosis Date    Alternating exotropia 4/18/2013    Bilateral cataracts     Depression     Diabetes mellitus     GERD (gastroesophageal reflux disease)     Hyperlipidemia     Hypertension     Menopausal state     Multiple gastric polyps     on EGD in 2004    Sickle cell trait        SURGICAL HISTORY:  Past Surgical History:   Procedure Laterality Date    CATARACT EXTRACTION W/  INTRAOCULAR LENS IMPLANT Left 06/05/2018    Dr. Castillo    CHOLECYSTECTOMY      laproscopic cholecystectomy    COLONOSCOPY N/A 10/27/2014    Performed by [facility] at [facility] (4TH FLR)    HYSTERECTOMY      DENISHA/BSO - fibroids    INSERTION-INTRAOCULAR LENS (IOL) Left 6/5/2018    Performed by Ga Castillo MD at [facility] OR 1ST FLR    Laparoscopic right hemicolectomy      OOPHORECTOMY      PHACOEMULSIFICATION-ASPIRATION-CATARACT Left 6/5/2018    Performed by Ga Castillo MD at [facility] OR 1ST FLR         MEDS:  Medcard reviewed and updated    ALLERGIES: Allergy Card reviewed and updated    SOCIAL HISTORY:   The patient is a nonsmoker, denies alcohol or illicit drug use.    ROS:  GENERAL: No fever, chills or weight loss.  Positive fatigue.  SKIN: No rashes.  HEAD: Positive headaches.  Denies recent head trauma.  EYES: No photophobia, ocular pain or diplopia.  EARS: Denies ear pain, discharge or vertigo.  NOSE: No epistaxis.  Positive postnasal drip.  MOUTH & THROAT: No hoarseness or change in voice.   NODES: Denies swollen glands.  CHEST: Denies shortness of breath or wheezing.  Positive cough and sputum production.  CARDIOVASCULAR: Denies chest pain or palpitations.  ABDOMEN: Appetite fine. Denies abdominal pain, constipation or blood in stool.   Intermittent diarrhea.  URINARY: No dysuria or hematuria.  MUSCULOSKELETAL: No joint stiffness or swelling. Positive constant back pain, aching sensation with intermittent radiation down both legs.  NEUROLOGIC: No history of seizures.  ENDOCRINE: Denies polyuria or polydipsia.  PSYCHIATRIC: Denies mood swings, anxiety, homicidal or suicidal thoughts.  Positive depression.    SCREENINGS:  Last cholesterol: 2017  Last colonoscopy: 2014, defer per patient request, due 2017 to 2019  Last mammogram: 2016  Last Pap smear: several years ago  Last tetanus: unknown  Last Pneumovax: 2010  Last eye exam: 2018  Last bone density: 2011  Last menstrual period: postmenopausal    PE:   Vitals:  Vitals:    12/28/18 1004   BP: (!) 145/89   Pulse: 85   Resp: 18   Temp: 98.4 °F (36.9 °C)       APPEARANCE: Well nourished, well developed, in no acute distress.    EYES: Sclerae anicteric. PERRL. EOMI.      EARS: TM's intact. No retraction or perforation.    NOSE: Mucosa pink. Airway clear.  MOUTH & THROAT: No tonsillar enlargement. No pharyngeal erythema or exudate. No stridor.  NECK: Supple, no thyromegaly.  NODES: No cervical, axillary or inguinal lymph node enlargement.  CHEST: Lungs clear to auscultation with unlabored respirations.  CARDIOVASCULAR: Normal S1, S2. No murmurs. No carotid bruits. No pedal edema.  ABDOMEN: Bowel sounds normal. Not distended. Soft. No tenderness or masses.  MUSCULOSKELETAL:  Normal gait, no cyanosis or clubbing.   SKIN: Normal skin turgor, warm and dry.  NEUROLOGIC: Cranial Nerves: Intact.  PSYCHIATRIC: The patient is oriented to person, place, and time and has a pleasant affect.        ASSESSMENT/PLAN:  Val was seen today for annual exam and uri.    Diagnoses and all orders for this visit:    Essential hypertension  -     CBC auto differential; Future  -     Comprehensive metabolic panel; Future  -     Lipid panel; Future  -     TSH; Future  -     Urinalysis; Future    Controlled type 2 diabetes  mellitus without complication, without long-term current use of insulin  -     Hemoglobin A1c; Future    Visit for screening mammogram  -     Mammo Digital Screening Bilat; Future    Postmenopausal status (age-related) (natural)  -     DXA Bone Density Spine And Hip; Future    Need for vaccination with 13-polyvalent pneumococcal conjugate vaccine  -     (In Office Administered) Pneumococcal Conjugate Vaccine (13 Valent) (IM)    Acute bronchitis, unspecified organism    Other orders  -     irbesartan (AVAPRO) 150 MG tablet; TAKE 1 TABLET(150 MG) BY MOUTH EVERY EVENING  -     doxycycline (VIBRA-TABS) 100 MG tablet; Take 1 tablet (100 mg total) by mouth 2 (two) times daily.  -     benzonatate (TESSALON) 100 MG capsule; Take 1 capsule (100 mg total) by mouth 3 (three) times daily as needed for Cough.

## 2018-12-30 ENCOUNTER — TELEPHONE (OUTPATIENT)
Dept: INTERNAL MEDICINE | Facility: CLINIC | Age: 73
End: 2018-12-30

## 2018-12-30 RX ORDER — POTASSIUM CHLORIDE 20 MEQ/1
20 TABLET, EXTENDED RELEASE ORAL DAILY
Qty: 3 TABLET | Refills: 0 | Status: SHIPPED | OUTPATIENT
Start: 2018-12-30 | End: 2018-12-30 | Stop reason: SDUPTHER

## 2018-12-30 NOTE — TELEPHONE ENCOUNTER
Please inform patient that labs are significant for an elevated glucose and hemoglobin A1c, as well as a mildly depressed potassium.  A prescription for potassium replacement for 3 days has been sent to the pharmacy electronically.  Please confirm that patient is taking metformin twice daily.

## 2018-12-31 RX ORDER — POTASSIUM CHLORIDE 20 MEQ/1
TABLET, EXTENDED RELEASE ORAL
Qty: 3 TABLET | Refills: 0 | Status: SHIPPED | OUTPATIENT
Start: 2018-12-31 | End: 2019-01-03

## 2018-12-31 NOTE — TELEPHONE ENCOUNTER
"Called to discuss the results "Please inform patient that labs are significant for an elevated glucose and hemoglobin A1c, as well as a mildly depressed potassium.  A prescription for potassium replacement for 3 days has been sent to the pharmacy electronically.  Please confirm that patient is taking metformin twice daily."    lmom for a rtc  "

## 2019-01-02 RX ORDER — METFORMIN HYDROCHLORIDE 500 MG/1
TABLET ORAL
Qty: 60 TABLET | Refills: 3 | Status: SHIPPED | OUTPATIENT
Start: 2019-01-02 | End: 2019-05-07 | Stop reason: SDUPTHER

## 2019-01-02 RX ORDER — LOVASTATIN 40 MG/1
TABLET ORAL
Qty: 30 TABLET | Refills: 6 | Status: SHIPPED | OUTPATIENT
Start: 2019-01-02 | End: 2019-04-03 | Stop reason: SDUPTHER

## 2019-01-07 RX ORDER — DIAZEPAM 5 MG/1
TABLET ORAL
Qty: 30 TABLET | Refills: 0 | Status: SHIPPED | OUTPATIENT
Start: 2019-01-07 | End: 2019-03-06 | Stop reason: SDUPTHER

## 2019-01-11 ENCOUNTER — HOSPITAL ENCOUNTER (OUTPATIENT)
Dept: RADIOLOGY | Facility: HOSPITAL | Age: 74
Discharge: HOME | End: 2019-01-11
Attending: INTERNAL MEDICINE
Payer: MEDICARE

## 2019-01-11 DIAGNOSIS — Z12.31 VISIT FOR SCREENING MAMMOGRAM: ICD-10-CM

## 2019-01-11 PROCEDURE — 77063 BREAST TOMOSYNTHESIS BI: CPT | Mod: 26,,, | Performed by: RADIOLOGY

## 2019-01-11 PROCEDURE — 77063 MAMMO DIGITAL SCREENING BILAT WITH TOMOSYNTHESIS_CAD: ICD-10-PCS | Mod: 26,,, | Performed by: RADIOLOGY

## 2019-01-11 PROCEDURE — 77067 SCR MAMMO BI INCL CAD: CPT | Mod: 26,,, | Performed by: RADIOLOGY

## 2019-01-11 PROCEDURE — 77067 SCR MAMMO BI INCL CAD: CPT | Mod: TC,PO

## 2019-01-11 PROCEDURE — 77067 MAMMO DIGITAL SCREENING BILAT WITH TOMOSYNTHESIS_CAD: ICD-10-PCS | Mod: 26,,, | Performed by: RADIOLOGY

## 2019-01-15 ENCOUNTER — APPOINTMENT (OUTPATIENT)
Dept: RADIOLOGY | Facility: CLINIC | Age: 74
End: 2019-01-15
Attending: INTERNAL MEDICINE
Payer: MEDICARE

## 2019-01-15 DIAGNOSIS — Z78.0 POSTMENOPAUSAL STATUS (AGE-RELATED) (NATURAL): ICD-10-CM

## 2019-01-15 PROCEDURE — 77080 DXA BONE DENSITY AXIAL: CPT | Mod: 26,,, | Performed by: INTERNAL MEDICINE

## 2019-01-15 PROCEDURE — 77080 DXA BONE DENSITY AXIAL: CPT | Mod: TC,PO

## 2019-01-15 PROCEDURE — 77080 DEXA BONE DENSITY SPINE HIP: ICD-10-PCS | Mod: 26,,, | Performed by: INTERNAL MEDICINE

## 2019-02-12 RX ORDER — HYDROCHLOROTHIAZIDE 25 MG/1
TABLET ORAL
Qty: 30 TABLET | Refills: 3 | Status: SHIPPED | OUTPATIENT
Start: 2019-02-12 | End: 2019-03-19

## 2019-02-20 ENCOUNTER — NURSE TRIAGE (OUTPATIENT)
Dept: ADMINISTRATIVE | Facility: CLINIC | Age: 74
End: 2019-02-20

## 2019-02-20 ENCOUNTER — TELEPHONE (OUTPATIENT)
Dept: INTERNAL MEDICINE | Facility: CLINIC | Age: 74
End: 2019-02-20

## 2019-02-20 NOTE — TELEPHONE ENCOUNTER
Patient was sent to triage nurse noted on message that  A mesasge was for her to contact the office

## 2019-02-20 NOTE — TELEPHONE ENCOUNTER
"----- Message from Mariya Romero sent at 2/20/2019  1:52 PM CST -----  Contact: Val Bragg [phone]  Patient would like to get medical advice.  Symptoms (please be specific): Rectal Bleeding  & Lower Abdominal Pain   How long has patient had these symptoms:  Pain for 2 days & Rectal Bleeding last night   Pharmacy name and phone # (DON'T enter "on file" or "in chart"):  ---  Any drug allergies:  ---  Would the patient rather a call back or a response via [facility]?:  Call back   Comments:  Patient was transferred to [facility]     "

## 2019-02-20 NOTE — TELEPHONE ENCOUNTER
Reason for Disposition   Rectal bleeding is minimal (e.g., blood just on toilet paper, a few drops in toilet bowl)    Protocols used: ST RECTAL BLEEDING-A-OH    Pt reports some blood on tissue after BM today. Denies black or starry stools. Intermittent abdominal pain last few days but none currently. Advised to see Md within 2 weeks

## 2019-02-21 ENCOUNTER — TELEPHONE (OUTPATIENT)
Dept: INTERNAL MEDICINE | Facility: CLINIC | Age: 74
End: 2019-02-21

## 2019-02-21 NOTE — TELEPHONE ENCOUNTER
----- Message from Beryl Morejon sent at 2/20/2019  4:56 PM CST -----  Contact: Pt Mobile/home [phone]    Patient is returning a phone call.  Who left a message for the patient:   Does patient know what this is regarding:    Comments: Patient said she received a call on today and she would like a call back please.

## 2019-02-21 NOTE — TELEPHONE ENCOUNTER
Spoke with pt to offer an appt today at 11 am with dr. Villarreal.    She declined, stating that she does not have transportation.    Pt agreeable to an appt on 2/22/19 with Dr. Steen.

## 2019-02-22 ENCOUNTER — OFFICE VISIT (OUTPATIENT)
Dept: INTERNAL MEDICINE | Facility: CLINIC | Age: 74
End: 2019-02-22
Payer: MEDICARE

## 2019-02-22 ENCOUNTER — TELEPHONE (OUTPATIENT)
Dept: INTERNAL MEDICINE | Facility: CLINIC | Age: 74
End: 2019-02-22

## 2019-02-22 VITALS
TEMPERATURE: 98 F | HEART RATE: 89 BPM | WEIGHT: 188.06 LBS | SYSTOLIC BLOOD PRESSURE: 145 MMHG | HEIGHT: 64 IN | BODY MASS INDEX: 32.11 KG/M2 | DIASTOLIC BLOOD PRESSURE: 76 MMHG | RESPIRATION RATE: 16 BRPM

## 2019-02-22 DIAGNOSIS — Z12.11 ENCOUNTER FOR SCREENING COLONOSCOPY: Primary | ICD-10-CM

## 2019-02-22 DIAGNOSIS — K92.1 BLOOD IN STOOL: Primary | ICD-10-CM

## 2019-02-22 PROCEDURE — 99214 OFFICE O/P EST MOD 30 MIN: CPT | Mod: PBBFAC,PO | Performed by: HOSPITALIST

## 2019-02-22 PROCEDURE — 99999 PR PBB SHADOW E&M-EST. PATIENT-LVL IV: ICD-10-PCS | Mod: PBBFAC,,, | Performed by: HOSPITALIST

## 2019-02-22 PROCEDURE — 99999 PR PBB SHADOW E&M-EST. PATIENT-LVL IV: CPT | Mod: PBBFAC,,, | Performed by: HOSPITALIST

## 2019-02-22 PROCEDURE — 99213 PR OFFICE/OUTPT VISIT, EST, LEVL III, 20-29 MIN: ICD-10-PCS | Mod: S$PBB,,, | Performed by: HOSPITALIST

## 2019-02-22 PROCEDURE — 99213 OFFICE O/P EST LOW 20 MIN: CPT | Mod: S$PBB,,, | Performed by: HOSPITALIST

## 2019-02-22 NOTE — TELEPHONE ENCOUNTER
----- Message from Kathrine Brar sent at 2/22/2019  2:05 PM CST -----  Contact: Pt [phone]  Pt would like a call back from the nurse regarding getting orders put in for her colonoscopy. She received a letter stating she is due.

## 2019-02-22 NOTE — PROGRESS NOTES
"Subjective:     @Patient ID: Val Bragg is a 73 y.o. female.    Chief Complaint: Abdominal Pain (rectal bleeding)    HPI  72 yo F presents for urgent visit for blood in stool 5-6 days ago.  Only lasted 2 days. Bright red blood. Reports small amt in toilet bowl but also when she wiped. Reports b.m. Slowed down. Does not strain or have constipation. Does not think she has hemorrhoids but does report having diverticulosis. Reports developing lower abdominal bloating. Reports mild passing gas. Denies abdominal pain, fever/chills. Sometimes feels weak at times.   Reports she was eating candy. And wonders if could have caused sx.   Reports she has not had a colonoscopy in awhile.   Not on any blood thinners or aspirin.     Review of Systems   Constitutional: Positive for fatigue. Negative for chills and fever.   HENT: Negative for congestion and sore throat.    Eyes: Negative for pain and visual disturbance.   Respiratory: Negative for cough and shortness of breath.    Cardiovascular: Negative for chest pain and leg swelling.   Gastrointestinal: Positive for blood in stool. Negative for abdominal pain, nausea and vomiting.   Endocrine: Negative for polydipsia and polyuria.   Genitourinary: Negative for difficulty urinating and dysuria.   Musculoskeletal: Negative for arthralgias and back pain.   Skin: Negative for rash and wound.   Neurological: Negative for dizziness, weakness and headaches.   Psychiatric/Behavioral: Negative for agitation and confusion.     Past medical history, surgical history, and family medical history reviewed and updated as appropriate.    Medications and allergies reviewed.     Objective:     Vitals:    02/22/19 1324   BP: (!) 145/76   Pulse: 89   Resp: 16   Temp: 98.3 °F (36.8 °C)   TempSrc: Oral   Weight: 85.3 kg (188 lb 0.8 oz)   Height: 5' 4" (1.626 m)     Body mass index is 32.28 kg/m².  Physical Exam   Constitutional: She is oriented to person, place, and time. She appears " well-developed and well-nourished. No distress.   HENT:   Head: Normocephalic and atraumatic.   Mouth/Throat: Oropharynx is clear and moist. No oropharyngeal exudate.   Eyes: Conjunctivae are normal. Pupils are equal, round, and reactive to light. Right eye exhibits no discharge. Left eye exhibits no discharge.   Neck: Normal range of motion. Neck supple.   Cardiovascular: Normal rate, regular rhythm and intact distal pulses. Exam reveals no friction rub.   Pulmonary/Chest: Effort normal and breath sounds normal.   Abdominal: Soft. Bowel sounds are normal. She exhibits no distension. There is no tenderness. There is no guarding.   Genitourinary:   Genitourinary Comments: External hemorrhoid at 6 o'clock position.   Guaiac stool negative.    Musculoskeletal: Normal range of motion. She exhibits no edema.   Neurological: She is alert and oriented to person, place, and time.   Skin: Skin is warm and dry.   Psychiatric: She has a normal mood and affect. Her behavior is normal.   Vitals reviewed.      Lab Results   Component Value Date    WBC 7.99 12/28/2018    HGB 12.3 12/28/2018    HCT 38.1 12/28/2018     12/28/2018    CHOL 133 12/28/2018    TRIG 116 12/28/2018    HDL 43 12/28/2018    ALT 26 12/28/2018    AST 43 (H) 12/28/2018     12/28/2018    K 3.4 (L) 12/28/2018     12/28/2018    CREATININE 1.3 12/28/2018    BUN 11 12/28/2018    CO2 28 12/28/2018    TSH 1.253 12/28/2018    INR 0.9 04/10/2008    GLUF 106 11/18/2004    HGBA1C 7.3 (H) 12/28/2018       Assessment:     1. Blood in stool      Plan:   Val was seen today for abdominal pain.    Diagnoses and all orders for this visit:    Blood in stool  -     Ambulatory Referral to Gastroenterology    Guaiac negative in clinic. Does have external hemorrhoid but unclear if cause of blood in stool. Reviewed pt's last cscope in 2014 showing multiple polyps an diverticulosis.  Will refer to GI for further evaluation. Counseled if bleeding returned to go to  notify MD and/or go to ER for evaluation      Follow-up if symptoms worsen or fail to improve.    Karli Osei MD  Internal Medicine    2/22/2019

## 2019-03-04 RX ORDER — PANTOPRAZOLE SODIUM 40 MG/1
TABLET, DELAYED RELEASE ORAL
Qty: 90 TABLET | Refills: 0 | Status: SHIPPED | OUTPATIENT
Start: 2019-03-04 | End: 2019-06-01 | Stop reason: SDUPTHER

## 2019-03-07 ENCOUNTER — TELEPHONE (OUTPATIENT)
Dept: INTERNAL MEDICINE | Facility: CLINIC | Age: 74
End: 2019-03-07

## 2019-03-07 RX ORDER — DIAZEPAM 5 MG/1
TABLET ORAL
Qty: 30 TABLET | Refills: 0 | Status: SHIPPED | OUTPATIENT
Start: 2019-03-07 | End: 2019-04-15 | Stop reason: SDUPTHER

## 2019-03-08 RX ORDER — TRAZODONE HYDROCHLORIDE 150 MG/1
TABLET ORAL
Qty: 90 TABLET | Refills: 0 | Status: SHIPPED | OUTPATIENT
Start: 2019-03-08 | End: 2019-06-05 | Stop reason: SDUPTHER

## 2019-03-19 ENCOUNTER — NURSE TRIAGE (OUTPATIENT)
Dept: ADMINISTRATIVE | Facility: CLINIC | Age: 74
End: 2019-03-19

## 2019-03-19 ENCOUNTER — HOSPITAL ENCOUNTER (INPATIENT)
Facility: HOSPITAL | Age: 74
LOS: 1 days | Discharge: HOME | DRG: 871 | End: 2019-03-20
Attending: EMERGENCY MEDICINE | Admitting: HOSPITALIST
Payer: MEDICARE

## 2019-03-19 DIAGNOSIS — I10 ESSENTIAL HYPERTENSION: ICD-10-CM

## 2019-03-19 DIAGNOSIS — H25.10 NUCLEAR SENILE CATARACT, UNSPECIFIED LATERALITY: ICD-10-CM

## 2019-03-19 DIAGNOSIS — R53.1 WEAKNESS: ICD-10-CM

## 2019-03-19 DIAGNOSIS — H25.11 NUCLEAR SCLEROSIS OF RIGHT EYE: ICD-10-CM

## 2019-03-19 DIAGNOSIS — I12.9 BENIGN HYPERTENSIVE RENAL DISEASE: ICD-10-CM

## 2019-03-19 DIAGNOSIS — H52.7 REFRACTIVE ERROR: ICD-10-CM

## 2019-03-19 DIAGNOSIS — E83.42 HYPOMAGNESEMIA: ICD-10-CM

## 2019-03-19 DIAGNOSIS — E83.39 HYPOPHOSPHATEMIA: ICD-10-CM

## 2019-03-19 DIAGNOSIS — E11.9 DM TYPE 2 WITHOUT RETINOPATHY: ICD-10-CM

## 2019-03-19 DIAGNOSIS — R26.89 BALANCE PROBLEMS: ICD-10-CM

## 2019-03-19 DIAGNOSIS — R05.9 COUGH: ICD-10-CM

## 2019-03-19 DIAGNOSIS — J18.9 PNEUMONIA OF RIGHT MIDDLE LOBE DUE TO INFECTIOUS ORGANISM: Primary | ICD-10-CM

## 2019-03-19 DIAGNOSIS — E11.9 CONTROLLED TYPE 2 DIABETES MELLITUS WITHOUT COMPLICATION, WITHOUT LONG-TERM CURRENT USE OF INSULIN: ICD-10-CM

## 2019-03-19 DIAGNOSIS — H50.15 ALTERNATING EXOTROPIA: ICD-10-CM

## 2019-03-19 DIAGNOSIS — N18.30 CKD (CHRONIC KIDNEY DISEASE) STAGE 3, GFR 30-59 ML/MIN: ICD-10-CM

## 2019-03-19 DIAGNOSIS — E78.41 ELEVATED LIPOPROTEIN(A): ICD-10-CM

## 2019-03-19 LAB
ALBUMIN SERPL BCP-MCNC: 3.7 G/DL
ALP SERPL-CCNC: 97 U/L
ALT SERPL W/O P-5'-P-CCNC: 28 U/L
ANION GAP SERPL CALC-SCNC: 13 MMOL/L
AST SERPL-CCNC: 46 U/L
BACTERIA #/AREA URNS AUTO: NORMAL /HPF
BASOPHILS # BLD AUTO: 0.04 K/UL
BASOPHILS NFR BLD: 0.2 %
BILIRUB SERPL-MCNC: 0.7 MG/DL
BILIRUB UR QL STRIP: NEGATIVE
BNP SERPL-MCNC: 47 PG/ML
BUN SERPL-MCNC: 12 MG/DL
CALCIUM SERPL-MCNC: 10.8 MG/DL
CHLORIDE SERPL-SCNC: 98 MMOL/L
CLARITY UR REFRACT.AUTO: CLEAR
CO2 SERPL-SCNC: 29 MMOL/L
COLOR UR AUTO: YELLOW
CREAT SERPL-MCNC: 1.3 MG/DL
DIFFERENTIAL METHOD: ABNORMAL
EOSINOPHIL # BLD AUTO: 0 K/UL
EOSINOPHIL NFR BLD: 0.1 %
ERYTHROCYTE [DISTWIDTH] IN BLOOD BY AUTOMATED COUNT: 14.2 %
EST. GFR  (AFRICAN AMERICAN): 46.7 ML/MIN/1.73 M^2
EST. GFR  (NON AFRICAN AMERICAN): 40.5 ML/MIN/1.73 M^2
GLUCOSE SERPL-MCNC: 219 MG/DL
GLUCOSE UR QL STRIP: NEGATIVE
HCT VFR BLD AUTO: 37.8 %
HGB BLD-MCNC: 12.9 G/DL
HGB UR QL STRIP: NEGATIVE
IMM GRANULOCYTES # BLD AUTO: 0.09 K/UL
IMM GRANULOCYTES NFR BLD AUTO: 0.5 %
KETONES UR QL STRIP: ABNORMAL
LACTATE SERPL-SCNC: 1.7 MMOL/L
LACTATE SERPL-SCNC: 3 MMOL/L
LEUKOCYTE ESTERASE UR QL STRIP: ABNORMAL
LYMPHOCYTES # BLD AUTO: 0.9 K/UL
LYMPHOCYTES NFR BLD: 5.2 %
MAGNESIUM SERPL-MCNC: 1.3 MG/DL
MCH RBC QN AUTO: 28.7 PG
MCHC RBC AUTO-ENTMCNC: 34.1 G/DL
MCV RBC AUTO: 84 FL
MICROSCOPIC COMMENT: NORMAL
MONOCYTES # BLD AUTO: 0.7 K/UL
MONOCYTES NFR BLD: 4.2 %
NEUTROPHILS # BLD AUTO: 15 K/UL
NEUTROPHILS NFR BLD: 89.8 %
NITRITE UR QL STRIP: NEGATIVE
NRBC BLD-RTO: 0 /100 WBC
PH UR STRIP: 7 [PH] (ref 5–8)
PHOSPHATE SERPL-MCNC: 1.9 MG/DL
PLATELET # BLD AUTO: 307 K/UL
PMV BLD AUTO: 11.8 FL
POTASSIUM SERPL-SCNC: 3.6 MMOL/L
PROT SERPL-MCNC: 8.6 G/DL
PROT UR QL STRIP: NEGATIVE
RBC # BLD AUTO: 4.5 M/UL
RBC #/AREA URNS AUTO: 1 /HPF (ref 0–4)
SODIUM SERPL-SCNC: 140 MMOL/L
SP GR UR STRIP: 1.01 (ref 1–1.03)
SQUAMOUS #/AREA URNS AUTO: 1 /HPF
TROPONIN I SERPL DL<=0.01 NG/ML-MCNC: <0.006 NG/ML
URN SPEC COLLECT METH UR: ABNORMAL
WBC # BLD AUTO: 16.68 K/UL
WBC #/AREA URNS AUTO: 3 /HPF (ref 0–5)

## 2019-03-19 PROCEDURE — 99223 PR INITIAL HOSPITAL CARE,LEVL III: ICD-10-PCS | Mod: ,,, | Performed by: HOSPITALIST

## 2019-03-19 PROCEDURE — 99285 EMERGENCY DEPT VISIT HI MDM: CPT | Mod: 25

## 2019-03-19 PROCEDURE — 63600175 PHARM REV CODE 636 W HCPCS: Performed by: EMERGENCY MEDICINE

## 2019-03-19 PROCEDURE — 83880 ASSAY OF NATRIURETIC PEPTIDE: CPT

## 2019-03-19 PROCEDURE — 96372 THER/PROPH/DIAG INJ SC/IM: CPT

## 2019-03-19 PROCEDURE — 96361 HYDRATE IV INFUSION ADD-ON: CPT

## 2019-03-19 PROCEDURE — 83605 ASSAY OF LACTIC ACID: CPT | Mod: 91

## 2019-03-19 PROCEDURE — 82962 GLUCOSE BLOOD TEST: CPT

## 2019-03-19 PROCEDURE — 93010 ELECTROCARDIOGRAM REPORT: CPT | Mod: ,,, | Performed by: INTERNAL MEDICINE

## 2019-03-19 PROCEDURE — 83735 ASSAY OF MAGNESIUM: CPT

## 2019-03-19 PROCEDURE — 99223 1ST HOSP IP/OBS HIGH 75: CPT | Mod: ,,, | Performed by: HOSPITALIST

## 2019-03-19 PROCEDURE — 99285 EMERGENCY DEPT VISIT HI MDM: CPT | Mod: ,,, | Performed by: EMERGENCY MEDICINE

## 2019-03-19 PROCEDURE — 93010 EKG 12-LEAD: ICD-10-PCS | Mod: ,,, | Performed by: INTERNAL MEDICINE

## 2019-03-19 PROCEDURE — 25000003 PHARM REV CODE 250: Performed by: EMERGENCY MEDICINE

## 2019-03-19 PROCEDURE — 96366 THER/PROPH/DIAG IV INF ADDON: CPT

## 2019-03-19 PROCEDURE — 63600175 PHARM REV CODE 636 W HCPCS: Performed by: HOSPITALIST

## 2019-03-19 PROCEDURE — 84100 ASSAY OF PHOSPHORUS: CPT

## 2019-03-19 PROCEDURE — 85025 COMPLETE CBC W/AUTO DIFF WBC: CPT

## 2019-03-19 PROCEDURE — 12000002 HC ACUTE/MED SURGE SEMI-PRIVATE ROOM

## 2019-03-19 PROCEDURE — 96375 TX/PRO/DX INJ NEW DRUG ADDON: CPT

## 2019-03-19 PROCEDURE — 81001 URINALYSIS AUTO W/SCOPE: CPT

## 2019-03-19 PROCEDURE — 87040 BLOOD CULTURE FOR BACTERIA: CPT | Mod: 59

## 2019-03-19 PROCEDURE — 96367 TX/PROPH/DG ADDL SEQ IV INF: CPT

## 2019-03-19 PROCEDURE — 96365 THER/PROPH/DIAG IV INF INIT: CPT

## 2019-03-19 PROCEDURE — 80053 COMPREHEN METABOLIC PANEL: CPT

## 2019-03-19 PROCEDURE — 93005 ELECTROCARDIOGRAM TRACING: CPT

## 2019-03-19 PROCEDURE — 25000003 PHARM REV CODE 250: Performed by: HOSPITALIST

## 2019-03-19 PROCEDURE — 99285 PR EMERGENCY DEPT VISIT,LEVEL V: ICD-10-PCS | Mod: ,,, | Performed by: EMERGENCY MEDICINE

## 2019-03-19 PROCEDURE — 84484 ASSAY OF TROPONIN QUANT: CPT

## 2019-03-19 PROCEDURE — 83605 ASSAY OF LACTIC ACID: CPT

## 2019-03-19 RX ORDER — PANTOPRAZOLE SODIUM 40 MG/1
40 TABLET, DELAYED RELEASE ORAL DAILY
Status: DISCONTINUED | OUTPATIENT
Start: 2019-03-20 | End: 2019-03-20 | Stop reason: HOSPADM

## 2019-03-19 RX ORDER — ONDANSETRON 8 MG/1
8 TABLET, ORALLY DISINTEGRATING ORAL EVERY 8 HOURS PRN
Status: DISCONTINUED | OUTPATIENT
Start: 2019-03-19 | End: 2019-03-20 | Stop reason: HOSPADM

## 2019-03-19 RX ORDER — MAGNESIUM SULFATE HEPTAHYDRATE 40 MG/ML
2 INJECTION, SOLUTION INTRAVENOUS ONCE
Status: COMPLETED | OUTPATIENT
Start: 2019-03-19 | End: 2019-03-20

## 2019-03-19 RX ORDER — AMOXICILLIN 250 MG
1 CAPSULE ORAL 2 TIMES DAILY PRN
Status: DISCONTINUED | OUTPATIENT
Start: 2019-03-19 | End: 2019-03-20 | Stop reason: HOSPADM

## 2019-03-19 RX ORDER — INSULIN ASPART 100 [IU]/ML
0-5 INJECTION, SOLUTION INTRAVENOUS; SUBCUTANEOUS
Status: DISCONTINUED | OUTPATIENT
Start: 2019-03-19 | End: 2019-03-20 | Stop reason: HOSPADM

## 2019-03-19 RX ORDER — IRBESARTAN 75 MG/1
150 TABLET ORAL DAILY
Status: DISCONTINUED | OUTPATIENT
Start: 2019-03-20 | End: 2019-03-20 | Stop reason: HOSPADM

## 2019-03-19 RX ORDER — LOVASTATIN 20 MG/1
40 TABLET ORAL NIGHTLY
Status: DISCONTINUED | OUTPATIENT
Start: 2019-03-19 | End: 2019-03-20 | Stop reason: HOSPADM

## 2019-03-19 RX ORDER — IPRATROPIUM BROMIDE AND ALBUTEROL SULFATE 2.5; .5 MG/3ML; MG/3ML
3 SOLUTION RESPIRATORY (INHALATION) EVERY 4 HOURS PRN
Status: DISCONTINUED | OUTPATIENT
Start: 2019-03-19 | End: 2019-03-20 | Stop reason: HOSPADM

## 2019-03-19 RX ORDER — SODIUM CHLORIDE 0.9 % (FLUSH) 0.9 %
5 SYRINGE (ML) INJECTION
Status: CANCELLED | OUTPATIENT
Start: 2019-03-19

## 2019-03-19 RX ORDER — ENOXAPARIN SODIUM 100 MG/ML
40 INJECTION SUBCUTANEOUS EVERY 24 HOURS
Status: DISCONTINUED | OUTPATIENT
Start: 2019-03-19 | End: 2019-03-20 | Stop reason: HOSPADM

## 2019-03-19 RX ORDER — GUAIFENESIN 100 MG/5ML
200 SOLUTION ORAL EVERY 4 HOURS PRN
Status: DISCONTINUED | OUTPATIENT
Start: 2019-03-19 | End: 2019-03-20 | Stop reason: HOSPADM

## 2019-03-19 RX ORDER — CEFTRIAXONE 1 G/1
1 INJECTION, POWDER, FOR SOLUTION INTRAMUSCULAR; INTRAVENOUS
Status: DISCONTINUED | OUTPATIENT
Start: 2019-03-20 | End: 2019-03-20 | Stop reason: HOSPADM

## 2019-03-19 RX ORDER — TRAZODONE HYDROCHLORIDE 50 MG/1
150 TABLET ORAL NIGHTLY
Status: DISCONTINUED | OUTPATIENT
Start: 2019-03-19 | End: 2019-03-20 | Stop reason: HOSPADM

## 2019-03-19 RX ORDER — SODIUM CHLORIDE 0.9 % (FLUSH) 0.9 %
5 SYRINGE (ML) INJECTION
Status: DISCONTINUED | OUTPATIENT
Start: 2019-03-19 | End: 2019-03-20 | Stop reason: HOSPADM

## 2019-03-19 RX ORDER — FLUOXETINE HYDROCHLORIDE 20 MG/1
40 CAPSULE ORAL DAILY
Status: DISCONTINUED | OUTPATIENT
Start: 2019-03-20 | End: 2019-03-20 | Stop reason: HOSPADM

## 2019-03-19 RX ORDER — HYDROCHLOROTHIAZIDE 25 MG/1
25 TABLET ORAL DAILY
Status: DISCONTINUED | OUTPATIENT
Start: 2019-03-20 | End: 2019-03-20 | Stop reason: HOSPADM

## 2019-03-19 RX ORDER — DIAZEPAM 5 MG/1
5 TABLET ORAL NIGHTLY PRN
Status: DISCONTINUED | OUTPATIENT
Start: 2019-03-19 | End: 2019-03-20 | Stop reason: HOSPADM

## 2019-03-19 RX ORDER — ACETAMINOPHEN 325 MG/1
650 TABLET ORAL EVERY 8 HOURS PRN
Status: DISCONTINUED | OUTPATIENT
Start: 2019-03-19 | End: 2019-03-20 | Stop reason: HOSPADM

## 2019-03-19 RX ORDER — NIFEDIPINE 30 MG/1
90 TABLET, EXTENDED RELEASE ORAL DAILY
Status: DISCONTINUED | OUTPATIENT
Start: 2019-03-20 | End: 2019-03-20 | Stop reason: HOSPADM

## 2019-03-19 RX ORDER — BENZONATATE 100 MG/1
100 CAPSULE ORAL 3 TIMES DAILY PRN
Status: DISCONTINUED | OUTPATIENT
Start: 2019-03-19 | End: 2019-03-20 | Stop reason: HOSPADM

## 2019-03-19 RX ADMIN — SODIUM CHLORIDE 1000 ML: 0.9 INJECTION, SOLUTION INTRAVENOUS at 07:03

## 2019-03-19 RX ADMIN — MAGNESIUM SULFATE IN WATER 2 G: 40 INJECTION, SOLUTION INTRAVENOUS at 07:03

## 2019-03-19 RX ADMIN — ENOXAPARIN SODIUM 40 MG: 100 INJECTION SUBCUTANEOUS at 07:03

## 2019-03-19 RX ADMIN — PIPERACILLIN AND TAZOBACTAM 4.5 G: 4; .5 INJECTION, POWDER, LYOPHILIZED, FOR SOLUTION INTRAVENOUS; PARENTERAL at 06:03

## 2019-03-19 RX ADMIN — TRAZODONE HYDROCHLORIDE 150 MG: 50 TABLET ORAL at 08:03

## 2019-03-19 RX ADMIN — DIBASIC SODIUM PHOSPHATE, MONOBASIC POTASSIUM PHOSPHATE AND MONOBASIC SODIUM PHOSPHATE 1 TABLET: 852; 155; 130 TABLET ORAL at 07:03

## 2019-03-19 RX ADMIN — LOVASTATIN 40 MG: 20 TABLET ORAL at 08:03

## 2019-03-19 RX ADMIN — SODIUM CHLORIDE 1000 ML: 0.9 INJECTION, SOLUTION INTRAVENOUS at 06:03

## 2019-03-19 NOTE — TELEPHONE ENCOUNTER
Reason for Disposition   Nursing judgment    Protocols used: ST NO PROTOCOL AVAILABLE - SICK ADULT-A-[place]    Mrs. Bragg states she has uncontrollable tremors that she has been present all day. Patient advised to go to the ED.

## 2019-03-19 NOTE — ED TRIAGE NOTES
Val Bragg, a 73 y.o. female presents to the ED via personal trasnportation with CC N/V and chills onset this morning, also c/o tremors throughout body and anxiety. Reports slight relief at this time. No tremors noted upon entry to ED room.    Patient identifiers verified verbally with patient and correct for Val Bragg.    LOC/ APPEARANCE: The patient is AAOx4. Pt is speaking appropriately, no slurred speech. Pt is clean and well groomed. No JVD visible. Pt updated on POC. Bed low and locked, call bell in pt reach.  SKIN: Skin is warm dry and intact, and color is consistent with ethnicity. Capillary refill <3 seconds. No breakdown or brusing visible. Mucus membranes moist, acyanotic.  RESPIRATORY: Airway is open and patent. Respirations-spontaneous, unlabored, regular rate, equal bilaterally on inspiration and expiration. No accessory muscle use noted. Lungs clear to auscultation in all fields bilaterally anterior and posterior.   CARDIAC: Patient has regular heart rate  No peripheral edema noted. Peripheral pulses present equal and strong throughout.  ABDOMEN: Soft and non-tender to palpation with no distention noted.  NEUROLOGIC: Eyes open spontaneously and facial expression symmetrical. Pt behavior appropriate to situation, and pt follows commands. Pt reports sensation present in all extremities when touched with a finger, denies any numbness or tingling. PERRLA  MUSCULOSKELETAL: Spontaneous movement noted to all extremities. Hand  equal and leg strength strong +5 bilaterally.

## 2019-03-19 NOTE — ED PROVIDER NOTES
"Encounter Date: 3/19/2019    SCRIBE #1 NOTE: I, Sofia Eaton, am scribing for, and in the presence of,  Dr. Vinson. I have scribed the entire note. the EKG reading.       History     Chief Complaint   Patient presents with    Tremors     States normally has tremors in bilateral legs but began with "shaking" in upper extremities today. Began around 12:30. Also reports lethargy and vomiting. Denies taking temperature at home.      Time patient was seen by the provider: 4:33 PM      The patient is a 73 y.o. female with co-morbidities including HTN, diabetes and HLD who presents to the ED with a complaint of tremors. Patient reports she woke up this morning coughing and states this usually happens when she is sleeping and, "saliva builds up." She also reports two episodes of vomiting, tremors to upper and lower extremities and chills. Denies fever or documented temperature. She is not sure of the source, but suspects her symptoms are due to anxiety. She states she takes, "half of an anxiety pill because a whole one makes me sleepy." Patient state she feels fine at this time. She denies any chest pain, SOB, abdominal pain, or any additional complaints.       The history is provided by the patient and medical records.     Review of patient's allergies indicates:   Allergen Reactions    Darvocet a500 [propoxyphene n-acetaminophen] Anaphylaxis     Past Medical History:   Diagnosis Date    Alternating exotropia 4/18/2013    Bilateral cataracts     Depression     Diabetes mellitus     GERD (gastroesophageal reflux disease)     Hyperlipidemia     Hypertension     Menopausal state     Multiple gastric polyps     on EGD in 2004    Sickle cell trait      Past Surgical History:   Procedure Laterality Date    CATARACT EXTRACTION W/  INTRAOCULAR LENS IMPLANT Left 06/05/2018    Dr. Castillo    CHOLECYSTECTOMY      laproscopic cholecystectomy    COLONOSCOPY N/A 10/27/2014    Performed by [facility]YNA Powers [facility] at [facility] " ENDO (4TH FLR)    HYSTERECTOMY      DENISHA/BSO - fibroids    INSERTION-INTRAOCULAR LENS (IOL) Left 6/5/2018    Performed by Ga Castillo MD at [facility] OR 1ST FLR    Laparoscopic right hemicolectomy      OOPHORECTOMY      PHACOEMULSIFICATION-ASPIRATION-CATARACT Left 6/5/2018    Performed by Ga Castillo MD at [facility] OR 1ST FLR     Family History   Problem Relation Age of Onset    Diabetes Mother     Stroke Mother     Hypertension Father     Heart disease Father     Hypertension Sister     Diabetes Sister     Cataracts Sister     Stroke Sister     Hypertension Brother     Cancer Brother         bladder    Cataracts Brother     Stroke Brother     Diabetes Paternal Aunt     Stroke Sister     Breast cancer Neg Hx     Colon cancer Neg Hx     Ovarian cancer Neg Hx     Melanoma Neg Hx     Amblyopia Neg Hx     Blindness Neg Hx     Glaucoma Neg Hx     Macular degeneration Neg Hx     Retinal detachment Neg Hx     Strabismus Neg Hx     Thyroid disease Neg Hx      Social History     Tobacco Use    Smoking status: Never Smoker    Smokeless tobacco: Never Used   Substance Use Topics    Alcohol use: No    Drug use: No     Review of Systems   Constitutional: Positive for chills. Negative for fever.   HENT: Negative for sore throat.    Respiratory: Negative for shortness of breath.    Cardiovascular: Negative for chest pain.   Gastrointestinal: Negative for nausea.   Genitourinary: Negative for dysuria.   Musculoskeletal: Negative for back pain.   Skin: Negative for rash.   Neurological: Positive for tremors. Negative for weakness.   Hematological: Does not bruise/bleed easily.       Physical Exam     Initial Vitals [03/19/19 1521]   BP Pulse Resp Temp SpO2   (!) 173/75 97 18 98.8 °F (37.1 °C) (!) 93 %      MAP       --         Physical Exam    Nursing note and vitals reviewed.  Constitutional: She appears well-developed and well-nourished. She is not diaphoretic. No distress.   HENT:    Head: Normocephalic and atraumatic.   Eyes: EOM are normal. Pupils are equal, round, and reactive to light.   Neck: Normal range of motion. Neck supple.   Cardiovascular: Normal rate and regular rhythm. Exam reveals no gallop and no friction rub.    No murmur heard.  Abdominal: Soft. She exhibits no distension. There is no tenderness. There is no rebound and no guarding.   Musculoskeletal: Normal range of motion.   Neurological: She is alert and oriented to person, place, and time. She has normal strength. No cranial nerve deficit or sensory deficit.   Skin: Skin is warm and dry. No rash noted. No erythema.   Psychiatric: She has a normal mood and affect. Her behavior is normal. Judgment and thought content normal.         ED Course   Procedures  Labs Reviewed   CBC W/ AUTO DIFFERENTIAL - Abnormal; Notable for the following components:       Result Value    WBC 16.68 (*)     Gran # (ANC) 15.0 (*)     Immature Grans (Abs) 0.09 (*)     Lymph # 0.9 (*)     Gran% 89.8 (*)     Lymph% 5.2 (*)     All other components within normal limits    Narrative:     SHARED LAV   URINALYSIS, REFLEX TO URINE CULTURE - Abnormal; Notable for the following components:    Ketones, UA Trace (*)     Leukocytes, UA Trace (*)     All other components within normal limits    Narrative:     Preferred Collection Type->Urine, Clean Catch   LACTIC ACID, PLASMA - Abnormal; Notable for the following components:    Lactate (Lactic Acid) 3.0 (*)     All other components within normal limits   MAGNESIUM - Abnormal; Notable for the following components:    Magnesium 1.3 (*)     All other components within normal limits   PHOSPHORUS - Abnormal; Notable for the following components:    Phosphorus 1.9 (*)     All other components within normal limits   COMPREHENSIVE METABOLIC PANEL - Abnormal; Notable for the following components:    Glucose 219 (*)     Calcium 10.8 (*)     Total Protein 8.6 (*)     AST 46 (*)     eGFR if  46.7 (*)      eGFR if non  40.5 (*)     All other components within normal limits   CULTURE, BLOOD   CULTURE, BLOOD   B-TYPE NATRIURETIC PEPTIDE    Narrative:     SHARED LAV   TROPONIN I    Narrative:     SHARED LAV   URINALYSIS MICROSCOPIC    Narrative:     Preferred Collection Type->Urine, Clean Catch   LACTIC ACID, PLASMA   POCT GLUCOSE MONITORING CONTINUOUS     EKG Readings: (Independently Interpreted)   Rhythm: Normal Sinus Rhythm. Heart Rate: 93.   T wave inversion anteriorly. No ST elevation or depression.       Imaging Results          X-Ray Chest PA And Lateral (Final result)  Result time 03/19/19 17:45:47    Final result by Marek Correia MD (03/19/19 17:45:47)                 Impression:      Airspace opacity within the right middle lobe, concerning for pneumonia.    Electronically signed by resident: Angelito Domínguez  Date:    03/19/2019  Time:    17:28    Electronically signed by: Marek Correia MD  Date:    03/19/2019  Time:    17:45             Narrative:    EXAMINATION:  XR CHEST PA AND LATERAL    CLINICAL HISTORY:  Cough    TECHNIQUE:  PA and lateral views of the chest were performed.    COMPARISON:  Chest radiograph 08/05/2014    FINDINGS:  The trachea is unremarkable.  Lungs well expanded bilaterally.  There is patchy ground-glass opacification within right middle lobe.  Cardiomediastinal silhouette appears grossly stable and within normal limits for size.  There are no pleural effusions.  There is no evidence of a pneumothorax.  There is no evidence of pneumomediastinum.  There is no evidence of free air beneath the hemidiaphragms. Regional osseous structures are unremarkable.                                 Medical Decision Making:   History:   Old Medical Records: I decided to obtain old medical records.  Initial Assessment:   Patient with an episode of shaking chills with two episodes of vomiting, without any obvious source. She is currently asymptomatic. Will check basic labs including cardiacs and  urine. Will ultimately discharge home if all negative.   Differential Diagnosis:   Anxiety, pneumonia, UTI, ACS  Independently Interpreted Test(s):   I have ordered and independently interpreted EKG Reading(s) - see prior notes  Clinical Tests:   Lab Tests: Ordered and Reviewed  Radiological Study: Ordered and Reviewed  Medical Tests: Ordered and Reviewed  ED Management:  7:05 PM - Patient found to have right middle lobe pneumonia with elevated white count and lactic acid. Will admit for IV antibiotics. Concern for possible aspiration pneumonia.             Scribe Attestation:   Scribe #1: I performed the above scribed service and the documentation accurately describes the services I performed. I attest to the accuracy of the note.               Clinical Impression:       ICD-10-CM ICD-9-CM   1. Pneumonia of right middle lobe due to infectious organism J18.1 486   2. Weakness R53.1 780.79   3. Cough R05 786.2         Disposition:   Disposition: Admitted  Condition: Fair                 I, Dr. New Vinson III, personally performed the services described in this documentation. All medical record entries made by the scribe were at my direction and in my presence.  I have reviewed the chart and agree that the record reflects my personal performance and is accurate and complete. New Vinson III, MD.  8:15 PM 03/19/2019         New Vinson III, MD  03/19/19 2015

## 2019-03-20 ENCOUNTER — TELEPHONE (OUTPATIENT)
Dept: INTERNAL MEDICINE | Facility: CLINIC | Age: 74
End: 2019-03-20

## 2019-03-20 VITALS
HEART RATE: 70 BPM | HEIGHT: 64 IN | OXYGEN SATURATION: 96 % | SYSTOLIC BLOOD PRESSURE: 166 MMHG | BODY MASS INDEX: 31.24 KG/M2 | TEMPERATURE: 97 F | WEIGHT: 183 LBS | DIASTOLIC BLOOD PRESSURE: 72 MMHG | RESPIRATION RATE: 18 BRPM

## 2019-03-20 LAB
ALBUMIN SERPL BCP-MCNC: 3.2 G/DL
ALP SERPL-CCNC: 86 U/L
ALT SERPL W/O P-5'-P-CCNC: 24 U/L
ANION GAP SERPL CALC-SCNC: 8 MMOL/L
AST SERPL-CCNC: 34 U/L
BASOPHILS # BLD AUTO: 0.03 K/UL
BASOPHILS NFR BLD: 0.2 %
BILIRUB SERPL-MCNC: 0.6 MG/DL
BUN SERPL-MCNC: 10 MG/DL
CALCIUM SERPL-MCNC: 10 MG/DL
CHLORIDE SERPL-SCNC: 103 MMOL/L
CO2 SERPL-SCNC: 30 MMOL/L
CREAT SERPL-MCNC: 1.1 MG/DL
DIFFERENTIAL METHOD: ABNORMAL
EOSINOPHIL # BLD AUTO: 0.1 K/UL
EOSINOPHIL NFR BLD: 0.4 %
ERYTHROCYTE [DISTWIDTH] IN BLOOD BY AUTOMATED COUNT: 14.4 %
EST. GFR  (AFRICAN AMERICAN): 57.2 ML/MIN/1.73 M^2
EST. GFR  (NON AFRICAN AMERICAN): 49.6 ML/MIN/1.73 M^2
GLUCOSE SERPL-MCNC: 156 MG/DL
HCT VFR BLD AUTO: 36.3 %
HGB BLD-MCNC: 11.7 G/DL
IMM GRANULOCYTES # BLD AUTO: 0.07 K/UL
IMM GRANULOCYTES NFR BLD AUTO: 0.4 %
LACTATE SERPL-SCNC: 1.9 MMOL/L
LYMPHOCYTES # BLD AUTO: 3.1 K/UL
LYMPHOCYTES NFR BLD: 18.2 %
MAGNESIUM SERPL-MCNC: 1.8 MG/DL
MCH RBC QN AUTO: 27.3 PG
MCHC RBC AUTO-ENTMCNC: 32.2 G/DL
MCV RBC AUTO: 85 FL
MONOCYTES # BLD AUTO: 0.8 K/UL
MONOCYTES NFR BLD: 4.5 %
NEUTROPHILS # BLD AUTO: 12.9 K/UL
NEUTROPHILS NFR BLD: 76.3 %
NRBC BLD-RTO: 0 /100 WBC
PHOSPHATE SERPL-MCNC: 2.5 MG/DL
PLATELET # BLD AUTO: 272 K/UL
PMV BLD AUTO: 11.5 FL
POCT GLUCOSE: 160 MG/DL (ref 70–110)
POTASSIUM SERPL-SCNC: 3.4 MMOL/L
PROT SERPL-MCNC: 7.8 G/DL
RBC # BLD AUTO: 4.28 M/UL
SODIUM SERPL-SCNC: 141 MMOL/L
WBC # BLD AUTO: 16.84 K/UL

## 2019-03-20 PROCEDURE — 63600175 PHARM REV CODE 636 W HCPCS: Performed by: HOSPITALIST

## 2019-03-20 PROCEDURE — 83735 ASSAY OF MAGNESIUM: CPT

## 2019-03-20 PROCEDURE — 84100 ASSAY OF PHOSPHORUS: CPT

## 2019-03-20 PROCEDURE — 99239 PR HOSPITAL DISCHARGE DAY,>30 MIN: ICD-10-PCS | Mod: ,,, | Performed by: HOSPITALIST

## 2019-03-20 PROCEDURE — 99239 HOSP IP/OBS DSCHRG MGMT >30: CPT | Mod: ,,, | Performed by: HOSPITALIST

## 2019-03-20 PROCEDURE — 25000003 PHARM REV CODE 250: Performed by: HOSPITALIST

## 2019-03-20 PROCEDURE — 80053 COMPREHEN METABOLIC PANEL: CPT

## 2019-03-20 PROCEDURE — 85025 COMPLETE CBC W/AUTO DIFF WBC: CPT

## 2019-03-20 PROCEDURE — 83605 ASSAY OF LACTIC ACID: CPT

## 2019-03-20 RX ORDER — LIDOCAINE 50 MG/G
1 PATCH TOPICAL
Status: DISCONTINUED | OUTPATIENT
Start: 2019-03-20 | End: 2019-03-20 | Stop reason: HOSPADM

## 2019-03-20 RX ORDER — LEVOFLOXACIN 500 MG/1
500 TABLET, FILM COATED ORAL DAILY
Qty: 6 TABLET | Refills: 0 | Status: SHIPPED | OUTPATIENT
Start: 2019-03-20 | End: 2019-03-27 | Stop reason: ALTCHOICE

## 2019-03-20 RX ORDER — GUAIFENESIN 100 MG/5ML
200 SOLUTION ORAL EVERY 4 HOURS PRN
Refills: 0 | COMMUNITY
Start: 2019-03-20 | End: 2019-03-30

## 2019-03-20 RX ORDER — BENZONATATE 100 MG/1
100 CAPSULE ORAL 3 TIMES DAILY PRN
Qty: 60 CAPSULE | Refills: 0 | Status: SHIPPED | OUTPATIENT
Start: 2019-03-20 | End: 2019-03-30

## 2019-03-20 RX ORDER — SODIUM CHLORIDE 9 MG/ML
INJECTION, SOLUTION INTRAVENOUS CONTINUOUS
Status: DISCONTINUED | OUTPATIENT
Start: 2019-03-20 | End: 2019-03-20 | Stop reason: HOSPADM

## 2019-03-20 RX ADMIN — MAGNESIUM SULFATE HEPTAHYDRATE 1 G: 500 INJECTION, SOLUTION INTRAMUSCULAR; INTRAVENOUS at 09:03

## 2019-03-20 RX ADMIN — AZITHROMYCIN MONOHYDRATE 500 MG: 500 INJECTION, POWDER, LYOPHILIZED, FOR SOLUTION INTRAVENOUS at 10:03

## 2019-03-20 RX ADMIN — PANTOPRAZOLE SODIUM 40 MG: 40 TABLET, DELAYED RELEASE ORAL at 09:03

## 2019-03-20 RX ADMIN — NIFEDIPINE 90 MG: 30 TABLET, FILM COATED, EXTENDED RELEASE ORAL at 09:03

## 2019-03-20 RX ADMIN — SODIUM CHLORIDE: 0.9 INJECTION, SOLUTION INTRAVENOUS at 10:03

## 2019-03-20 RX ADMIN — HYDROCHLOROTHIAZIDE 25 MG: 25 TABLET ORAL at 09:03

## 2019-03-20 RX ADMIN — FLUOXETINE 40 MG: 20 CAPSULE ORAL at 09:03

## 2019-03-20 RX ADMIN — DIBASIC SODIUM PHOSPHATE, MONOBASIC POTASSIUM PHOSPHATE AND MONOBASIC SODIUM PHOSPHATE 1 TABLET: 852; 155; 130 TABLET ORAL at 09:03

## 2019-03-20 RX ADMIN — CEFTRIAXONE SODIUM 1 G: 1 INJECTION, POWDER, FOR SOLUTION INTRAMUSCULAR; INTRAVENOUS at 08:03

## 2019-03-20 RX ADMIN — IRBESARTAN 150 MG: 150 TABLET ORAL at 11:03

## 2019-03-20 NOTE — PLAN OF CARE
Patient to discharge home to care of family.     03/20/19 1513   Final Note   Assessment Type Final Discharge Note   Anticipated Discharge Disposition Home   What phone number can be called within the next 1-3 days to see how you are doing after discharge? ([phone])   Hospital Follow Up  Appt(s) scheduled? Yes   Discharge plans and expectations educations in teach back method with documentation complete? Yes   Right Care Referral Info   Post Acute Recommendation No Care

## 2019-03-20 NOTE — H&P
Hospital Medicine  History and Physical      Patient Name: Val Bragg  MRN:  [id number]  [facility] Medicine Team: [facility] Melany Mock MD  Date of Admission:  3/19/2019     Principal Problem:  Right middle lobe pneumonia   Primary Care Physician: Anh Atkinson MD       History of Present Illness:    Ms. Val Bragg is a 73 y.o. female with type 2 diabetes, hypertension, and CKD who presents to the emergency department for evaluation of tremors.  She mentions that when she woke up this morning, she had her normal phlegm that she had to remove from her mouth, but then developed sudden onset diffuse tremors in her arms and legs.  She says that this is different from her normal tremors, and thinks that this might have even been chills.  She denies any fevers, cough or shortness of breath.  She denies any sick contacts.  She did get the pneumonia vaccine.  She does mention that in December she was diagnosed with bronchitis when she thought she had pneumonia, but mentions that this current situation feels different.  She endorses lethargy, as well as nausea and an episode of vomiting.    In the emergency department labs were notable for a white blood cell count of 16 and lactic acid of 3.  Chest x-ray showed a right middle lobe pneumonia.  She was given Zosyn and was admitted to Hospital Medicine for further management.      Review of Systems:  Constitutional: + chills, fatigue  HENT: Negative for sore throat, trouble swallowing.    Eyes: Negative for photophobia, visual disturbance.   Respiratory: Negative for cough, shortness of breath.    Cardiovascular: Negative for chest pain, palpitations, leg swelling.   Gastrointestinal: + nausea and vomiting  Endocrine: Negative for cold intolerance, heat intolerance.   Genitourinary: Negative for dysuria, frequency.   Musculoskeletal: Negative for arthralgias, myalgias.   Skin: Negative for rash, wound, erythema   Neurological: Negative for dizziness, syncope,  weakness, light-headedness.   Psychiatric/Behavioral: Negative for confusion, hallucinations, anxiety  All other systems reviewed and are negative.      Past Medical History: Patient has a past medical history of Alternating exotropia (4/18/2013), Bilateral cataracts, Depression, Diabetes mellitus, GERD (gastroesophageal reflux disease), Hyperlipidemia, Hypertension, Menopausal state, Multiple gastric polyps, and Sickle cell trait.      Past Surgical History: Patient has a past surgical history that includes Laparoscopic right hemicolectomy; Hysterectomy; Oophorectomy; Cholecystectomy; Phacoemulsification of cataract (Left, 6/5/2018); Intraocular prosthesis insertion (Left, 6/5/2018); and Cataract extraction w/  intraocular lens implant (Left, 06/05/2018).      Social History: Patient reports that  has never smoked. she has never used smokeless tobacco. She reports that she does not drink alcohol or use drugs.      Family History: Patient's family history includes Cancer in her brother; Cataracts in her brother and sister; Diabetes in her mother, paternal aunt, and sister; Heart disease in her father; Hypertension in her brother, father, and sister; Stroke in her brother, mother, sister, and sister.      Medications: Scheduled Meds:   [START ON 3/21/2019] azithromycin  250 mg Intravenous Q24H    [START ON 3/20/2019] azithromycin  500 mg Intravenous Once    [START ON 3/20/2019] cefTRIAXone (ROCEPHIN) IVPB  1 g Intravenous Q24H    enoxaparin  40 mg Subcutaneous Daily    [START ON 3/20/2019] FLUoxetine  40 mg Oral Daily    [START ON 3/20/2019] hydroCHLOROthiazide  25 mg Oral Daily    [START ON 3/20/2019] irbesartan  150 mg Oral Daily    lovastatin  40 mg Oral Nightly    magnesium sulfate IVPB  2 g Intravenous Once    [START ON 3/20/2019] NIFEdipine  90 mg Oral Daily    [START ON 3/20/2019] pantoprazole  40 mg Oral Daily    traZODone  150 mg Oral Nightly     Continuous Infusions:  PRN Meds:.acetaminophen,  diazePAM, insulin aspart U-100, ondansetron, senna-docusate 8.6-50 mg, sodium chloride 0.9%      Allergies: Patient is allergic to darvocet a500 [propoxyphene n-acetaminophen].      Physical Exam:    Temp:  [98.6 °F (37 °C)-99.7 °F (37.6 °C)]   Pulse:  [85-97]   Resp:  [18-20]   BP: (151-185)/(67-82)   SpO2:  [93 %-98 %]     Constitutional: Appears well developed and well nourished  Head: Normocephalic and atraumatic.   Mouth/Throat: Oropharynx is clear and moist.   Eyes: EOM are normal. Pupils are equal, round, and reactive to light. No scleral icterus.   Neck: Normal range of motion. Neck supple.   Cardiovascular: Normal heart rate.  Regular heart rhythm.  No murmur heard.  Pulmonary/Chest: Effort normal. No respiratory distress. No wheezes, rales, or rhonchi  Abdominal: Soft. Bowel sounds are normal.  No distension.  No tenderness  Musculoskeletal: Normal range of motion. No edema.   Neurological: Alert and oriented to person, place, and time.   Skin: Skin is warm and dry.   Psychiatric: Normal mood and affect. Behavior is normal.       No intake or output data in the 24 hours ending 03/19/19 2039  Recent Labs   Lab 03/19/19  1645   WBC 16.68*   HGB 12.9   HCT 37.8        Recent Labs   Lab 03/19/19  1744      K 3.6   CL 98   CO2 29   BUN 12   CREATININE 1.3   *   CALCIUM 10.8*   MG 1.3*   PHOS 1.9*     Recent Labs   Lab 03/19/19  1744   ALKPHOS 97   ALT 28   AST 46*   ALBUMIN 3.7   PROT 8.6*   BILITOT 0.7      No results for input(s): POCTGLUCOSE in the last 168 hours.  Recent Labs     03/19/19  1645   TROPONINI <0.006       Recent Labs     03/19/19  1950   LACTATE 1.7          Assessment and Plan:    Ms. Val Bragg is a 73 y.o. female who presented to [facility] on 3/19/2019 with right middle lobe pneumonia.    CAP  Right Middle Lobe Pneumonia due to Infectious Organisms, NOS  · Satting well on room air  · 1/4 SIRS criteria with a WBC 16  · S/p Zosyn in the ED.  Start Ceftriaxone 1g IV q24h  and Azithromycin 500mg x 1 followed by 250mg daily thereafter to complete a 5 day course (start date 3/19)  · Duonebs prn as no wheezing or rhonchi  · Incentive spirometry  · Guaifenesin, Codeine cough syrup, and Tessalon Perles prn cough    Essential HTN  · Chronic and stable  · Continue HCTZ 25mg PO daily  · Continue Avapro 150mg PO daily  · Continue Nifedipine 90mg PO daily    Type 2 DM without Long Term Insulin Use  · HbA1c 7.3 in December, will repeat  · Home DM regimen:  Metformin 500mg PO daily  · SSI with POCT accuchecks and Diabetic diet    HLD  · Chronic and stable  · Continue Lovastatin 40mg PO qHS    CKD Stage 3  · Chronic and stable  · Monitor    Benign Hypertensive Renal Disease  · As above    Hypophosphatemia  · Phos 1.9  · Replace    Hypomagnesemia  · Mag 1.3  · Replace     Diet:  Diabetic  GI PPx:  PPI  DVT PPx:  Lovenox  Goals of Care:  Full      Disposition:  Likely home kelechi afternoon pending labs and lethargy  Discharge Needs:  TBD      Melany Mock MD  [facility]  Cell:  [phone]  Spectra:  [phone]  Pager:  [phone]

## 2019-03-20 NOTE — TELEPHONE ENCOUNTER
----- Message from Shahab Bates sent at 3/20/2019 12:33 PM CDT -----  Contact: [facility] [place]   Sooner appointment than the  can schedule.  Did you offer to schedule the next available appointment and put the patient on the wait list?:    When is the first available appointment: not slots   What is the nature of the appointment: hospital f/u  What visit type: hospital f/u  Patient preference of timeframe to be scheduled:    Comments:     Please call an advise  Thank you

## 2019-03-20 NOTE — DISCHARGE SUMMARY
Discharge Summary  Hospital Medicine       Name: Val Bragg  YOB: 1945 (Age: 73 y.o.)  Date of Admission: 3/19/2019  Date of Discharge: 3/20/2019  Attending Provider on Discharge: Elise Rangel MD  [facility] Medicine Team: [facility] MED S  Code status: Full Code    Primary Care Provider: Anh Atkinson MD    Discharge Diagnosis:  Active Hospital Problems    Diagnosis  POA    *Right middle lobe pneumonia [J18.1]  Yes    CKD (chronic kidney disease) stage 3, GFR 30-59 ml/min [N18.3]  Yes    Pneumonia of right middle lobe due to infectious organism [J18.1]  Yes    Benign hypertensive renal disease [I12.9]  Yes    Hypophosphatemia [E83.39]  Yes    Hypomagnesemia [E83.42]  Yes    Essential hypertension [I10]  Yes    Diabetes type 2, controlled [E11.9]  Yes    Hyperlipidemia [E78.5]  Yes      Resolved Hospital Problems   No resolved problems to display.       HPI Per H&P:  Ms. Val Bragg is a 73 y.o. female with type 2 diabetes, hypertension, and CKD who presents to the emergency department for evaluation of tremors.  She mentions that when she woke up this morning, she had her normal phlegm that she had to remove from her mouth, but then developed sudden onset diffuse tremors in her arms and legs.  She says that this is different from her normal tremors, and thinks that this might have even been chills.  She denies any fevers, cough or shortness of breath.  She denies any sick contacts.  She did get the pneumonia vaccine.  She does mention that in December she was diagnosed with bronchitis when she thought she had pneumonia, but mentions that this current situation feels different.  She endorses lethargy, as well as nausea and an episode of vomiting.  In the emergency department labs were notable for a white blood cell count of 16 and lactic acid of 3. Chest x-ray showed a right middle lobe pneumonia.  She was given Zosyn and was admitted to Hospital Medicine for further  management.    Hospital Course:  Sever Sepsis: SIRS criteria + suspected infection + lactic acidosis  Community Acquired Pneumonia:  Right Middle Lobe Pneumonia due to Infectious Organisms, NOS:   Leukocytosis / lactic Acidosis:  -pt had leucocytosis with WBC count of 16, tachycardia with HR of 97  -CXR shows Airspace opacity within the right middle lobe, concerning for pneumonia  -blood cultures with NGTD  -repeat lactic acid decreased to 1.7  -was given antibiotics with ceftriaxone 1g q24hrs and azithromycin 500mg daily and switched to Levaquin 500mg to finish 7 day course of antibiotic at discharge  -Guaifenesin and Tessalon Perles prn cough  -tylenol PRN for fevers     Essential HTN  Benign Hypertensive Renal Disease  -Chronic and stable  -Continue HCTZ 25mg PO daily  -Continue Avapro 150mg PO daily  -continue Nifedipine 90mg PO daily     Type 2 DM without Long Term Insulin Use  -HbA1c 7.3 in December, will repeat  -continue PTA metformin at discharge  -accuchecks qACHS  -BG goal: Preprandial blood glucose target <140 mg/dL, Random glucoses <180 mg/dL  -ADA diet     Hyperlipidemia:  -continue PTA Lovastatin 40mg PO qHS     Chronic Kidney Disease Stage 3:  -Baseline serum Cr around 1.3  -Continue to monitor renal function with daily labs   -Avoid nephrotoxins     Hypophosphatemia  -Phos 1.9 on admission, improved to 2.5     Hypomagnesemia  -Mag 1.3 on admission, improved to 1.8     Anxiety / depression:  -continue fluoxitine     Insomnia:  -continue PTA trazodone     GERD:  -continue PPI    Labs:  Recent Labs   Lab 03/19/19  1645 03/20/19  0505   WBC 16.68* 16.84*   HGB 12.9 11.7*   HCT 37.8 36.3*    272     Recent Labs   Lab 03/19/19  1744 03/20/19  0505    141   K 3.6 3.4*   CL 98 103   CO2 29 30*   BUN 12 10   CREATININE 1.3 1.1   * 156*   CALCIUM 10.8* 10.0   MG 1.3* 1.8   PHOS 1.9* 2.5*     Recent Labs   Lab 03/19/19  1744 03/20/19  0505   ALKPHOS 97 86   ALT 28 24   AST 46* 34   ALBUMIN  3.7 3.2*   PROT 8.6* 7.8   BILITOT 0.7 0.6      Recent Labs   Lab 03/20/19  0505   POCTGLUCOSE 160*     Recent Labs     03/19/19  1645   TROPONINI <0.006       ROS (Positive in Bold, otherwise negative)  Constitutional: fever, chills, night sweats  CV: chest pain, edema, palpitations  Resp: SOB, cough, sputum production  GI: changes in appetite, NVDC, pain, melena, hematochezia, GERD, hematemesis  : Dysuria, hematuria, urinary urgency, frequency  MSK: arthralgia/myalgia, joint swelling  Neuro/Psych: anxiety, depression    PEx   Temp:  [97.2 °F (36.2 °C)-99.7 °F (37.6 °C)]   Pulse:  [70-97]   Resp:  [12-20]   BP: (149-185)/(65-82)   SpO2:  [93 %-98 %]      General: no distress   Lungs: clear to ausculation anteriorly and posteriorly   Heart: regular rate and rhythm   Abdomen: normal bowel sounds, soft, no tenderness   Extremities: no edema. No clubbing or cyanosis     Procedures:   CXR: The trachea is unremarkable.  Lungs well expanded bilaterally.  There is patchy ground-glass opacification within right middle lobe.  Cardiomediastinal silhouette appears grossly stable and within normal limits for size.  There are no pleural effusions.  There is no evidence of a pneumothorax.  There is no evidence of pneumomediastinum.  There is no evidence of free air beneath the hemidiaphragms. Regional osseous structures are unremarkable.    Consultants: none    Current Discharge Medication List      START taking these medications    Details   benzonatate (TESSALON) 100 MG capsule Take 1 capsule (100 mg total) by mouth 3 (three) times daily as needed for Cough.  Qty: 60 capsule, Refills: 0      guaifenesin 100 mg/5 ml (ROBITUSSIN) 100 mg/5 mL syrup Take 10 mLs (200 mg total) by mouth every 4 (four) hours as needed for Congestion.  Refills: 0      levoFLOXacin (LEVAQUIN) 500 MG tablet Take 1 tablet (500 mg total) by mouth once daily. for 6 days  Qty: 6 tablet, Refills: 0         CONTINUE these medications which have NOT CHANGED     Details   diazePAM (VALIUM) 5 MG tablet TAKE 1 TABLET BY MOUTH EVERY NIGHT AT BEDTIME AS NEEDED  Qty: 30 tablet, Refills: 0      FLUoxetine (PROZAC) 40 MG capsule TAKE 1 CAPSULE BY MOUTH EVERY DAY  Qty: 90 capsule, Refills: 3      hydroCHLOROthiazide (HYDRODIURIL) 25 MG tablet TAKE 1 TABLET BY MOUTH EVERY DAY  Qty: 90 tablet, Refills: 0    Comments: **Patient requests 90 days supply**      irbesartan (AVAPRO) 150 MG tablet TAKE 1 TABLET(150 MG) BY MOUTH EVERY EVENING  Qty: 90 tablet, Refills: 3      lovastatin (MEVACOR) 40 MG tablet TAKE 1 TABLET BY MOUTH EVERY NIGHT AT BEDTIME  Qty: 30 tablet, Refills: 6      metFORMIN (GLUCOPHAGE) 500 MG tablet TAKE 1 TABLET(500 MG) BY MOUTH TWICE DAILY  Qty: 60 tablet, Refills: 3      multivitamin capsule Take 1 capsule by mouth once daily.      NIFEdipine (PROCARDIA-XL) 90 MG (OSM) 24 hr tablet TAKE 1 TABLET BY MOUTH EVERY DAY  Qty: 30 tablet, Refills: 0      pantoprazole (PROTONIX) 40 MG tablet TAKE 1 TABLET BY MOUTH ONCE DAILY  Qty: 90 tablet, Refills: 0      traZODone (DESYREL) 150 MG tablet TAKE 1 TABLET BY MOUTH EVERY NIGHT AT BEDTIME  Qty: 90 tablet, Refills: 0      blood sugar diagnostic Strp 1 strip by Misc.(Non-Drug; Combo Route) route once daily.  Qty: 100 strip, Refills: 4    Comments: One touch Ultra test strips      mometasone (NASONEX) 50 mcg/actuation nasal spray 2 sprays by Nasal route every evening.  Qty: 1 each, Refills: 3      polycarbophil (FIBERCON) 625 mg tablet Take 625 mg by mouth once daily.         STOP taking these medications       doxycycline (VIBRA-TABS) 100 MG tablet Comments:   Reason for Stopping:               The relevant and important risks, side effects, and benefits of their medications were reviewed with patient during hospitalization and at discharge. The patient was given the opportunity to discuss and ask questions about their medications, including target symptoms, potential risks, side effects and benefits of their medications, as  well as their expected prognosis if non-medication treatment options were chosen.  The patient expresses understanding of all these options and information and voluntarily consents to treatment.    Discharge Diet:diabetic diet: 1800 calorie with Normal Fluid intake of 1500 - 2000 mL per day    Activity: activity as tolerated    Discharge Condition: Stable    Disposition: Home or Self Care    Tests pending at the time of discharge: none      Time spent  on the discharge of the patient including review of hospital course with the patient. reviewing discharge medications and arranging follow-up care: 37 mins    Discharge examination Patient was seen and examined on the date of discharge and determined to be suitable for discharge.    Discharge plan and follow up:  It is critical that you make your follow-up appointment(s). If you are discharged on the weekend or after business hours, or if we are unable to schedule these appointments for you for any reason, you or a family member need to call during the next business day to schedule your appointment(s).    -Follow up with you PCP, Anh Atkinson MD within 1-2 weeks as arranged with SW and treatment team prior to discharge  -Take all medications as prescribed and listed above.     - Please return to ED or call your physician if you have:         1. Fevers > 101.5 unresponsive to tylenol.       2. Abdominal pain and/or distention       3. Intractable nausea, vomiting or diarrhea       4. Inability to tolerate adequate oral intake of food       5. Neurologic changes, chest pain or shortness of breath         Future Appointments   Date Time Provider Department Center   4/5/2019  1:15 PM Juan Blanchard OD [facility] OPTOMTY [place]   5/10/2019  3:00 PM Jeffrey Maynard MD [facility] GASTRO [facility]         Elise Rangel MD  Hospital Medicine Staff  [phone] pager

## 2019-03-20 NOTE — PLAN OF CARE
Patient lives with  in a one story home. Multiple steps with Rail to enter. Patient feels comfortable navigating steps. Patient doesn't drive,  provides transportation to and from Appointments. No issues with filling prescriptions @ [facility]  pharmacy on [address]. Patient's discharge plan is to return home to care of family. Encouraged to call CM with discharge needs.     03/20/19 5531   Discharge Assessment   Assessment Type Discharge Planning Assessment   Confirmed/corrected address and phone number on facesheet? Yes   Assessment information obtained from? Patient   Communicated expected length of stay with patient/caregiver no   Prior to hospitilization cognitive status: Alert/Oriented;No Deficits   Prior to hospitalization functional status: Assistive Equipment   Current cognitive status: Alert/Oriented;No Deficits   Current Functional Status: Assistive Equipment   Lives With spouse   Able to Return to Prior Arrangements yes   Is patient able to care for self after discharge? Yes   Who are your caregiver(s) and their phone number(s)? (Antolin Bragg (Spouse) [phone], [phone])   Patient's perception of discharge disposition home or selfcare   Readmission Within the Last 30 Days no previous admission in last 30 days   Patient currently being followed by outpatient case management? No   Patient currently receives any other outside agency services? No   Equipment Currently Used at Home cane, quad;shower chair   Do you have any problems affording any of your prescribed medications? No   Is the patient taking medications as prescribed? yes   Does the patient have transportation home? Yes   Transportation Anticipated family or friend will provide   Does the patient receive services at the [facility]madin Clinic? No   Discharge Plan A Home with family   Discharge Plan B Home with family;Home Health   DME Needed Upon Discharge  none   Patient/Family in Agreement with Plan yes

## 2019-03-22 ENCOUNTER — PATIENT OUTREACH (OUTPATIENT)
Dept: ADMINISTRATIVE | Facility: CLINIC | Age: 74
End: 2019-03-22

## 2019-03-24 LAB
BACTERIA BLD CULT: NORMAL
BACTERIA BLD CULT: NORMAL

## 2019-03-27 ENCOUNTER — OFFICE VISIT (OUTPATIENT)
Dept: INTERNAL MEDICINE | Facility: CLINIC | Age: 74
End: 2019-03-27
Payer: MEDICARE

## 2019-03-27 VITALS
BODY MASS INDEX: 32.52 KG/M2 | DIASTOLIC BLOOD PRESSURE: 60 MMHG | HEART RATE: 84 BPM | SYSTOLIC BLOOD PRESSURE: 130 MMHG | TEMPERATURE: 98 F | OXYGEN SATURATION: 96 % | RESPIRATION RATE: 18 BRPM | HEIGHT: 64 IN | WEIGHT: 190.5 LBS

## 2019-03-27 DIAGNOSIS — I10 ESSENTIAL HYPERTENSION: ICD-10-CM

## 2019-03-27 DIAGNOSIS — Z09 HOSPITAL DISCHARGE FOLLOW-UP: Primary | ICD-10-CM

## 2019-03-27 DIAGNOSIS — J18.9 PNEUMONIA OF RIGHT MIDDLE LOBE DUE TO INFECTIOUS ORGANISM: ICD-10-CM

## 2019-03-27 PROCEDURE — 99999 PR PBB SHADOW E&M-EST. PATIENT-LVL III: ICD-10-PCS | Mod: PBBFAC,,, | Performed by: HOSPITALIST

## 2019-03-27 PROCEDURE — 99213 OFFICE O/P EST LOW 20 MIN: CPT | Mod: PBBFAC,PO | Performed by: HOSPITALIST

## 2019-03-27 PROCEDURE — 99495 TCM SERVICES (MODERATE COMPLEXITY): ICD-10-PCS | Mod: S$PBB,,, | Performed by: HOSPITALIST

## 2019-03-27 PROCEDURE — 99495 TRANSJ CARE MGMT MOD F2F 14D: CPT | Mod: S$PBB,,, | Performed by: HOSPITALIST

## 2019-03-27 PROCEDURE — 99999 PR PBB SHADOW E&M-EST. PATIENT-LVL III: CPT | Mod: PBBFAC,,, | Performed by: HOSPITALIST

## 2019-03-27 PROCEDURE — 99495 TRANSJ CARE MGMT MOD F2F 14D: CPT | Mod: PBBFAC,PO | Performed by: HOSPITALIST

## 2019-03-27 NOTE — PROGRESS NOTES
Transitional Care Note  Subjective:       Patient ID: Val Bragg is a 73 y.o. female.  Chief Complaint: Hospital Follow Up ([facility] - pneumonia) and Foot Pain (left.  injury a month ago)    Family and/or Caretaker present at visit?  No  Diagnostic tests reviewed/disposition: No diagnosic tests pending after this hospitalization.  Disease/illness education: [facility]  Home health/community services discussion/referrals: Patient does not have home health established from hospital visit.  They do not need home health.  If needed, we will set up home health for the patient.   Establishment or re-establishment of referral orders for community resources: No other necessary community resources.   Discussion with other health care providers: No discussion with other health care providers necessary.   HPI   72 yo F presents for hospital f/u. [place] [facility] [place] R middle lob pneumonia. Pt completed 6 day course of levaquin yesterday. Reports she is doing well since discharge. Only reports having some phlegm in throat that she is having difficulty clearing. Thought about taking mucinex but has not yet. Denies f/c or sob. Also reports pain in left foot 1 month ago is improving. Reports she had a fall at the time she hurt her foot.     Review of Systems   Constitutional: Negative for chills and fever.   HENT: Negative for congestion and sore throat.         +phlegm in throat   Eyes: Negative for pain and visual disturbance.   Respiratory: Negative for cough and shortness of breath.    Cardiovascular: Negative for chest pain and leg swelling.   Gastrointestinal: Negative for abdominal pain, nausea and vomiting.   Endocrine: Negative for polydipsia and polyuria.   Genitourinary: Negative for difficulty urinating and dysuria.   Musculoskeletal: Negative for arthralgias and back pain.   Skin: Negative for rash and wound.   Neurological: Negative for dizziness, weakness and headaches.  "  Psychiatric/Behavioral: Negative for agitation and confusion.       Objective:       Vitals:    03/27/19 1102   BP: 130/60   BP Location: Right arm   Patient Position: Sitting   BP Method: Large (Manual)   Pulse: 84   Resp: 18   Temp: 97.7 °F (36.5 °C)   TempSrc: Oral   SpO2: 96%   Weight: 86.4 kg (190 lb 7.6 oz)   Height: 5' 4" (1.626 m)       Physical Exam   Constitutional: She is oriented to person, place, and time. She appears well-developed and well-nourished. No distress.   HENT:   Head: Normocephalic and atraumatic.   Mouth/Throat: Oropharynx is clear and moist. No oropharyngeal exudate.   Eyes: Conjunctivae are normal. Right eye exhibits no discharge. Left eye exhibits no discharge. No scleral icterus.   Neck: Normal range of motion. Neck supple.   Cardiovascular: Normal rate, regular rhythm and intact distal pulses. Exam reveals no friction rub.   No murmur heard.  Pulmonary/Chest: Effort normal and breath sounds normal.   Abdominal: Soft. Bowel sounds are normal. She exhibits no distension. There is no tenderness. There is no guarding.   Musculoskeletal: Normal range of motion.   +pedal edema otherwise L foot exam visually normal. +2 dp pulse of left foot    Neurological: She is alert and oriented to person, place, and time.   Skin: Skin is warm and dry.   Psychiatric: She has a normal mood and affect. Her behavior is normal.   Vitals reviewed.      Assessment:       1. Hospital discharge follow-up    2. Pneumonia of right middle lobe due to infectious organism    3. Essential hypertension        Plan:      Hospital discharge follow-up/Pneumonia      - Pt appears to be doing well clinically from pneumonia. Has completed course of antibiotics. Ok to take mucinex for phlegm. At this time would not recommend repeat CXR as radiographically pneumonia would likely still be present        Essential hypertension       -  BP controlled. Continue current regimen       Karli Osei MD    Internal Medicine   "

## 2019-04-03 ENCOUNTER — TELEPHONE (OUTPATIENT)
Dept: INTERNAL MEDICINE | Facility: CLINIC | Age: 74
End: 2019-04-03

## 2019-04-03 RX ORDER — LOVASTATIN 40 MG/1
40 TABLET ORAL NIGHTLY
Qty: 30 TABLET | Refills: 6 | Status: SHIPPED | OUTPATIENT
Start: 2019-04-03 | End: 2019-10-24 | Stop reason: SDUPTHER

## 2019-04-03 RX ORDER — LOVASTATIN 40 MG/1
40 TABLET ORAL NIGHTLY
Qty: 90 TABLET | Refills: 0 | Status: CANCELLED | OUTPATIENT
Start: 2019-04-03 | End: 2019-07-02

## 2019-04-05 ENCOUNTER — OFFICE VISIT (OUTPATIENT)
Dept: OPTOMETRY | Facility: CLINIC | Age: 74
End: 2019-04-05
Payer: MEDICARE

## 2019-04-05 DIAGNOSIS — Z13.5 GLAUCOMA SCREENING: ICD-10-CM

## 2019-04-05 DIAGNOSIS — E11.9 TYPE 2 DIABETES MELLITUS WITHOUT RETINOPATHY: Primary | ICD-10-CM

## 2019-04-05 DIAGNOSIS — H50.15 ALTERNATING EXOTROPIA: ICD-10-CM

## 2019-04-05 DIAGNOSIS — H52.4 PRESBYOPIA: ICD-10-CM

## 2019-04-05 PROCEDURE — 99999 PR PBB SHADOW E&M-EST. PATIENT-LVL II: CPT | Mod: PBBFAC,,, | Performed by: OPTOMETRIST

## 2019-04-05 PROCEDURE — 92014 PR EYE EXAM, EST PATIENT,COMPREHESV: ICD-10-PCS | Mod: S$PBB,,, | Performed by: OPTOMETRIST

## 2019-04-05 PROCEDURE — 99999 PR PBB SHADOW E&M-EST. PATIENT-LVL II: ICD-10-PCS | Mod: PBBFAC,,, | Performed by: OPTOMETRIST

## 2019-04-05 PROCEDURE — 99212 OFFICE O/P EST SF 10 MIN: CPT | Mod: PBBFAC,PO | Performed by: OPTOMETRIST

## 2019-04-05 PROCEDURE — 92015 PR REFRACTION: ICD-10-PCS | Mod: ,,, | Performed by: OPTOMETRIST

## 2019-04-05 PROCEDURE — 92015 DETERMINE REFRACTIVE STATE: CPT | Mod: ,,, | Performed by: OPTOMETRIST

## 2019-04-05 PROCEDURE — 92014 COMPRE OPH EXAM EST PT 1/>: CPT | Mod: S$PBB,,, | Performed by: OPTOMETRIST

## 2019-04-05 NOTE — PROGRESS NOTES
HPI     Patient here for refraction only. Referred by Dr. Castillo at [facility]. Pt c/o floaters. Patient interested in bifocals, because   wants to see the difference in where the distance and near vision is   marked on her glasses for convenience--doesn't like PALs. Pt denies any   other ocular complaints.  Hx cat surgery OS    Eye meds: none    S/p phacoemulsification of cataract left (6/5/2018)    Hx of diabetes, exotropia, cataracts of right eye, refractive error,   hypertension.    Last edited by Juan Blanchard, OD on 4/5/2019  2:08 PM. (History)        ROS     Positive for: Endocrine (DM), Eyes (cat surgery )    Negative for: Constitutional, Gastrointestinal, Neurological, Skin,   Genitourinary, Musculoskeletal, HENT, Cardiovascular, Respiratory,   Psychiatric, Allergic/Imm, Heme/Lymph    Last edited by Juan Blanchard, OD on 4/5/2019  2:08 PM. (History)        Assessment /Plan     For exam results, see Encounter Report.    Type 2 diabetes mellitus without retinopathy    Alternating exotropia    Glaucoma screening    Presbyopia      1. AXT   2. Mild pco sp pciol OS  3. Cat OD--pt wishes to wait on surgery.  Doesn't like PALs--wishes Rx for lined bifocals  4. DM- WITHOUT RETINOPATHY.  Advised yearly DFE        Plan:     rtc 1 yr

## 2019-04-16 RX ORDER — DIAZEPAM 5 MG/1
TABLET ORAL
Qty: 30 TABLET | Refills: 0 | Status: SHIPPED | OUTPATIENT
Start: 2019-04-16 | End: 2019-05-17 | Stop reason: SDUPTHER

## 2019-05-07 RX ORDER — METFORMIN HYDROCHLORIDE 500 MG/1
TABLET ORAL
Qty: 60 TABLET | Refills: 3 | Status: SHIPPED | OUTPATIENT
Start: 2019-05-07 | End: 2019-09-02 | Stop reason: SDUPTHER

## 2019-05-09 ENCOUNTER — TELEPHONE (OUTPATIENT)
Dept: INTERNAL MEDICINE | Facility: CLINIC | Age: 74
End: 2019-05-09

## 2019-05-09 RX ORDER — METFORMIN HYDROCHLORIDE 500 MG/1
500 TABLET ORAL 2 TIMES DAILY WITH MEALS
Qty: 180 TABLET | Refills: 3 | Status: CANCELLED | OUTPATIENT
Start: 2019-05-09

## 2019-05-10 ENCOUNTER — OFFICE VISIT (OUTPATIENT)
Dept: GASTROENTEROLOGY | Facility: CLINIC | Age: 74
End: 2019-05-10
Payer: MEDICARE

## 2019-05-10 ENCOUNTER — TELEPHONE (OUTPATIENT)
Dept: ENDOSCOPY | Facility: HOSPITAL | Age: 74
End: 2019-05-10

## 2019-05-10 VITALS
SYSTOLIC BLOOD PRESSURE: 161 MMHG | HEART RATE: 87 BPM | HEIGHT: 63 IN | DIASTOLIC BLOOD PRESSURE: 77 MMHG | WEIGHT: 191.56 LBS | BODY MASS INDEX: 33.94 KG/M2

## 2019-05-10 DIAGNOSIS — K21.9 GASTROESOPHAGEAL REFLUX DISEASE, ESOPHAGITIS PRESENCE NOT SPECIFIED: Primary | ICD-10-CM

## 2019-05-10 DIAGNOSIS — Z86.010 PERSONAL HISTORY OF COLONIC POLYPS: ICD-10-CM

## 2019-05-10 PROCEDURE — 99213 OFFICE O/P EST LOW 20 MIN: CPT | Mod: PBBFAC | Performed by: INTERNAL MEDICINE

## 2019-05-10 PROCEDURE — 99999 PR PBB SHADOW E&M-EST. PATIENT-LVL III: ICD-10-PCS | Mod: PBBFAC,,, | Performed by: INTERNAL MEDICINE

## 2019-05-10 PROCEDURE — 99999 PR PBB SHADOW E&M-EST. PATIENT-LVL III: CPT | Mod: PBBFAC,,, | Performed by: INTERNAL MEDICINE

## 2019-05-10 PROCEDURE — 99204 OFFICE O/P NEW MOD 45 MIN: CPT | Mod: S$PBB,,, | Performed by: INTERNAL MEDICINE

## 2019-05-10 PROCEDURE — 99204 PR OFFICE/OUTPT VISIT, NEW, LEVL IV, 45-59 MIN: ICD-10-PCS | Mod: S$PBB,,, | Performed by: INTERNAL MEDICINE

## 2019-05-10 NOTE — PROGRESS NOTES
[facility] Consultation Note    Reason for Consult:  The primary encounter diagnosis was Gastroesophageal reflux disease, esophagitis presence not specified. A diagnosis of Personal history of colonic polyps was also pertinent to this visit.    PCP:   Anh Atkinson   [address] [facility] [facility] [address] [place] [address]    Referring MD:  Karli Osei Md  [address] [place] [address]    Initial History of Present Illness (HPI):  This is a 73 y.o. female here for evaluation of blood in stool. Seen by PCP in Feb and was guaiac negative and had an external hemorrhoid noted.    Also notes some mucus in her mouth at night when she sleeps.  She feels like she can't lay flat due to regurgitation    Abdominal pain - no  Reflux - yes  Dysphagia - no   Bowel habits - normal  GI bleeding - not currently  NSAID usage - rare alleve    Interval HPI 05/10/2019:  The patient's last visit with me was on Visit date not found.      ROS:  Constitutional: No fevers, chills, No weight loss  ENT: No allergies  CV: No chest pain  Pulm: No cough, No shortness of breath  Ophtho: No vision changes  GI: see HPI  Derm: No rash  Heme: No lymphadenopathy, No bruising  MSK: + arthritis  : No dysuria, No hematuria  Endo: No hot or cold intolerance  Neuro: + feels off balance when she walks  Psych: No anxiety, No depression    Medical History:  has a past medical history of Alternating exotropia (4/18/2013), Bilateral cataracts, Depression, Diabetes mellitus, GERD (gastroesophageal reflux disease), Hyperlipidemia, Hypertension, Menopausal state, Multiple gastric polyps, and Sickle cell trait.    Surgical History:  has a past surgical history that includes Laparoscopic right hemicolectomy; Hysterectomy; Oophorectomy; Cholecystectomy; Phacoemulsification of cataract (Left, 6/5/2018); Intraocular prosthesis insertion (Left, 6/5/2018); and Cataract extraction w/  intraocular lens implant (Left, 06/05/2018).    Family  "History: family history includes Cancer in her brother; Cataracts in her brother and sister; Diabetes in her mother, paternal aunt, and sister; Heart disease in her father; Hypertension in her brother, father, and sister; Stroke in her brother, mother, sister, and sister..     Social History:  reports that she has never smoked. She has never used smokeless tobacco. She reports that she does not drink alcohol or use drugs.    Review of patient's allergies indicates:   Allergen Reactions    Darvocet a500 [propoxyphene n-acetaminophen] Anaphylaxis       Medication List with Changes/Refills   Current Medications    BLOOD SUGAR DIAGNOSTIC STRP    1 strip by Misc.(Non-Drug; Combo Route) route once daily.    DIAZEPAM (VALIUM) 5 MG TABLET    TAKE 1 TABLET BY MOUTH EVERY NIGHT AT BEDTIME    FLUOXETINE (PROZAC) 40 MG CAPSULE    TAKE 1 CAPSULE BY MOUTH EVERY DAY    HYDROCHLOROTHIAZIDE (HYDRODIURIL) 25 MG TABLET    TAKE 1 TABLET BY MOUTH EVERY DAY    IRBESARTAN (AVAPRO) 150 MG TABLET    TAKE 1 TABLET(150 MG) BY MOUTH EVERY EVENING    LOVASTATIN (MEVACOR) 40 MG TABLET    Take 1 tablet (40 mg total) by mouth nightly.    METFORMIN (GLUCOPHAGE) 500 MG TABLET    TAKE 1 TABLET(500 MG) BY MOUTH TWICE DAILY    MOMETASONE (NASONEX) 50 MCG/ACTUATION NASAL SPRAY    2 sprays by Nasal route every evening.    MULTIVITAMIN CAPSULE    Take 1 capsule by mouth once daily.    NIFEDIPINE (PROCARDIA-XL) 90 MG (OSM) 24 HR TABLET    TAKE 1 TABLET BY MOUTH EVERY DAY    PANTOPRAZOLE (PROTONIX) 40 MG TABLET    TAKE 1 TABLET BY MOUTH ONCE DAILY    POLYCARBOPHIL (FIBERCON) 625 MG TABLET    Take 625 mg by mouth once daily.    TRAZODONE (DESYREL) 150 MG TABLET    TAKE 1 TABLET BY MOUTH EVERY NIGHT AT BEDTIME         Objective Findings:    Vital Signs:  BP (!) 161/77   Pulse 87   Ht 5' 3" (1.6 m)   Wt 86.9 kg (191 lb 9.3 oz)   BMI 33.94 kg/m²   Body mass index is 33.94 kg/m².    Physical Exam:  General Appearance: Well appearing in no acute " distress  Head:   Normocephalic, without obvious abnormality  Eyes:    No scleral icterus, EOMI  ENT: Neck supple, Lips, mucosa, and tongue normal; teeth and gums normal  Lungs: CTA bilaterally in anterior and posterior fields, no wheezes, no crackles.  Heart:  Regular rate and rhythm, S1, S2 normal, no murmurs heard  Abdomen: Soft, non tender, non distended with positive bowel sounds in all four quadrants. No hepatosplenomegaly, ascites, or mass  Extremities: 2+ pulses, no clubbing, cyanosis or edema  Skin: No rash  Neurologic: CN II-XII intact      Labs:  Lab Results   Component Value Date    WBC 16.84 (H) 03/20/2019    HGB 11.7 (L) 03/20/2019    HCT 36.3 (L) 03/20/2019     03/20/2019    CHOL 133 12/28/2018    TRIG 116 12/28/2018    HDL 43 12/28/2018    ALT 24 03/20/2019    AST 34 03/20/2019     03/20/2019    K 3.4 (L) 03/20/2019     03/20/2019    CREATININE 1.1 03/20/2019    BUN 10 03/20/2019    CO2 30 (H) 03/20/2019    TSH 1.253 12/28/2018    INR 0.9 04/10/2008    GLUF 106 11/18/2004    HGBA1C 7.3 (H) 12/28/2018           Imaging:    Endoscopy:    Colon 2014- mult polyps (TA, HP, inflammatory polyp)  EGD 2004 - fundic gland polyps      Assessment:  1. Gastroesophageal reflux disease, esophagitis presence not specified    2. Personal history of colonic polyps           Recommendations:  1. EGD for chronic gerd (mucus in throat?). If normal consider ENT eval  2. Colonoscopy for previous polyps    No follow-ups on file.      Order summary:  Orders Placed This Encounter    Case request GI: ESOPHAGOGASTRODUODENOSCOPY (EGD), COLONOSCOPY         Thank you so much for allowing me to participate in the care of Val Maynard MD

## 2019-05-10 NOTE — LETTER
May 10, 2019      Karli Osei MD  [address] [facility] [address]           [facility] Gastroenterology  [address]  [facility] [address]  Phone: [phone]  Fax: [phone]          Patient: Val Bragg   MR Number: [id number]   YOB: 1945   Date of Visit: 5/10/2019       Dear Dr. Karli Osei:    Thank you for referring Val Bragg to me for evaluation. Attached you will find relevant portions of my assessment and plan of care.    If you have questions, please do not hesitate to call me. I look forward to following Val Bragg along with you.    Sincerely,    Jeffrey Maynard MD    Enclosure  CC:  No Recipients    If you would like to receive this communication electronically, please contact [email or web address] or [phone] to request more information on [employer] access.    For providers and/or their staff who would like to refer a patient to [facility], please contact us through our one-stop-shop provider referral line, [facility], at [phone].    If you feel you have received this communication in error or would no longer like to receive these types of communications, please e-mail [email or web address]

## 2019-05-13 DIAGNOSIS — Z12.11 SPECIAL SCREENING FOR MALIGNANT NEOPLASMS, COLON: Primary | ICD-10-CM

## 2019-05-13 RX ORDER — POLYETHYLENE GLYCOL 3350, SODIUM SULFATE ANHYDROUS, SODIUM BICARBONATE, SODIUM CHLORIDE, POTASSIUM CHLORIDE 236; 22.74; 6.74; 5.86; 2.97 G/4L; G/4L; G/4L; G/4L; G/4L
4 POWDER, FOR SOLUTION ORAL ONCE
Qty: 4000 ML | Refills: 0 | Status: SHIPPED | OUTPATIENT
Start: 2019-05-13 | End: 2019-05-13

## 2019-05-17 RX ORDER — FLUOXETINE HYDROCHLORIDE 40 MG/1
CAPSULE ORAL
Qty: 90 CAPSULE | Refills: 0 | Status: SHIPPED | OUTPATIENT
Start: 2019-05-17 | End: 2019-08-12 | Stop reason: SDUPTHER

## 2019-05-17 RX ORDER — DIAZEPAM 5 MG/1
TABLET ORAL
Qty: 30 TABLET | Refills: 0 | Status: SHIPPED | OUTPATIENT
Start: 2019-05-17 | End: 2019-06-11 | Stop reason: SDUPTHER

## 2019-05-17 NOTE — TELEPHONE ENCOUNTER
Called in valium refill to recorder at    [facility] [id number] - [place], [place] - [address] [facility] [address]   [address] [facility] [address]   Phone: [phone]      As dr eason approved.

## 2019-06-03 RX ORDER — PANTOPRAZOLE SODIUM 40 MG/1
TABLET, DELAYED RELEASE ORAL
Qty: 90 TABLET | Refills: 0 | Status: SHIPPED | OUTPATIENT
Start: 2019-06-03 | End: 2019-08-30 | Stop reason: SDUPTHER

## 2019-06-05 RX ORDER — TRAZODONE HYDROCHLORIDE 150 MG/1
TABLET ORAL
Qty: 90 TABLET | Refills: 0 | Status: SHIPPED | OUTPATIENT
Start: 2019-06-05 | End: 2019-09-08 | Stop reason: SDUPTHER

## 2019-06-11 RX ORDER — DIAZEPAM 5 MG/1
TABLET ORAL
Qty: 30 TABLET | Refills: 0 | Status: SHIPPED | OUTPATIENT
Start: 2019-06-11 | End: 2019-07-16 | Stop reason: SDUPTHER

## 2019-06-20 RX ORDER — HYDROCHLOROTHIAZIDE 25 MG/1
TABLET ORAL
Qty: 30 TABLET | Refills: 3 | Status: ON HOLD | OUTPATIENT
Start: 2019-06-20 | End: 2019-08-13 | Stop reason: HOSPADM

## 2019-07-03 ENCOUNTER — TELEPHONE (OUTPATIENT)
Dept: INTERNAL MEDICINE | Facility: CLINIC | Age: 74
End: 2019-07-03

## 2019-07-03 RX ORDER — MELOXICAM 7.5 MG/1
7.5 TABLET ORAL DAILY
Qty: 30 TABLET | Refills: 3 | Status: SHIPPED | OUTPATIENT
Start: 2019-07-03 | End: 2019-09-26 | Stop reason: SDUPTHER

## 2019-07-03 NOTE — TELEPHONE ENCOUNTER
----- Message from Eve Dudley sent at 7/3/2019  3:42 PM CDT -----  Contact: self/[phone]  Pt called in regards to having arthritis in her back and wanted to know what else can she take besides aleve.    [facility] [phone]  Please advise

## 2019-07-03 NOTE — TELEPHONE ENCOUNTER
Please inform patient that prescription for meloxicam has been sent to the pharmacy electronically.  However, she will need to discontinue Aleve, as these medications are in the same category of drugs.

## 2019-07-16 RX ORDER — DIAZEPAM 5 MG/1
TABLET ORAL
Qty: 30 TABLET | Refills: 2 | Status: SHIPPED | OUTPATIENT
Start: 2019-07-16 | End: 2019-10-24 | Stop reason: SDUPTHER

## 2019-07-23 RX ORDER — NIFEDIPINE 90 MG/1
TABLET, EXTENDED RELEASE ORAL
Qty: 90 TABLET | Refills: 0 | Status: SHIPPED | OUTPATIENT
Start: 2019-07-23 | End: 2019-10-14 | Stop reason: SDUPTHER

## 2019-07-31 ENCOUNTER — EXTERNAL CHRONIC CARE MANAGEMENT (OUTPATIENT)
Dept: PRIMARY CARE CLINIC | Facility: CLINIC | Age: 74
End: 2019-07-31
Payer: MEDICARE

## 2019-07-31 PROCEDURE — 99490 PR CHRONIC CARE MGMT, 1ST 20 MIN: ICD-10-PCS | Mod: S$PBB,,, | Performed by: INTERNAL MEDICINE

## 2019-07-31 PROCEDURE — 99490 CHRNC CARE MGMT STAFF 1ST 20: CPT | Mod: PBBFAC,PO | Performed by: INTERNAL MEDICINE

## 2019-07-31 PROCEDURE — 99490 CHRNC CARE MGMT STAFF 1ST 20: CPT | Mod: S$PBB,,, | Performed by: INTERNAL MEDICINE

## 2019-08-13 ENCOUNTER — HOSPITAL ENCOUNTER (OUTPATIENT)
Facility: HOSPITAL | Age: 74
Discharge: HOME | End: 2019-08-13
Attending: INTERNAL MEDICINE | Admitting: INTERNAL MEDICINE
Payer: MEDICARE

## 2019-08-13 ENCOUNTER — ANESTHESIA EVENT (OUTPATIENT)
Dept: ENDOSCOPY | Facility: HOSPITAL | Age: 74
End: 2019-08-13
Payer: MEDICARE

## 2019-08-13 ENCOUNTER — ANESTHESIA (OUTPATIENT)
Dept: ENDOSCOPY | Facility: HOSPITAL | Age: 74
End: 2019-08-13
Payer: MEDICARE

## 2019-08-13 VITALS
WEIGHT: 183 LBS | DIASTOLIC BLOOD PRESSURE: 77 MMHG | HEART RATE: 79 BPM | TEMPERATURE: 98 F | OXYGEN SATURATION: 96 % | SYSTOLIC BLOOD PRESSURE: 164 MMHG | HEIGHT: 64 IN | RESPIRATION RATE: 15 BRPM | BODY MASS INDEX: 31.24 KG/M2

## 2019-08-13 DIAGNOSIS — K63.5 COLON POLYPS: ICD-10-CM

## 2019-08-13 DIAGNOSIS — Z86.010 PERSONAL HISTORY OF COLONIC POLYPS: Primary | ICD-10-CM

## 2019-08-13 LAB — POCT GLUCOSE: 182 MG/DL (ref 70–110)

## 2019-08-13 PROCEDURE — G0105 COLORECTAL SCRN; HI RISK IND: ICD-10-PCS | Mod: ,,, | Performed by: INTERNAL MEDICINE

## 2019-08-13 PROCEDURE — 37000009 HC ANESTHESIA EA ADD 15 MINS: Performed by: INTERNAL MEDICINE

## 2019-08-13 PROCEDURE — G0105 COLORECTAL SCRN; HI RISK IND: HCPCS | Mod: ,,, | Performed by: INTERNAL MEDICINE

## 2019-08-13 PROCEDURE — E9220 PRA ENDO ANESTHESIA: ICD-10-PCS | Mod: ,,, | Performed by: REGISTERED NURSE

## 2019-08-13 PROCEDURE — 82962 GLUCOSE BLOOD TEST: CPT | Performed by: INTERNAL MEDICINE

## 2019-08-13 PROCEDURE — 43235 EGD DIAGNOSTIC BRUSH WASH: CPT | Performed by: INTERNAL MEDICINE

## 2019-08-13 PROCEDURE — 43235 PR EGD, FLEX, DIAGNOSTIC: ICD-10-PCS | Mod: 51,GC,, | Performed by: INTERNAL MEDICINE

## 2019-08-13 PROCEDURE — E9220 PRA ENDO ANESTHESIA: HCPCS | Mod: ,,, | Performed by: REGISTERED NURSE

## 2019-08-13 PROCEDURE — 63600175 PHARM REV CODE 636 W HCPCS: Performed by: INTERNAL MEDICINE

## 2019-08-13 PROCEDURE — G0105 COLORECTAL SCRN; HI RISK IND: HCPCS | Performed by: INTERNAL MEDICINE

## 2019-08-13 PROCEDURE — 37000008 HC ANESTHESIA 1ST 15 MINUTES: Performed by: INTERNAL MEDICINE

## 2019-08-13 PROCEDURE — 63600175 PHARM REV CODE 636 W HCPCS: Performed by: REGISTERED NURSE

## 2019-08-13 PROCEDURE — 43235 EGD DIAGNOSTIC BRUSH WASH: CPT | Mod: 51,GC,, | Performed by: INTERNAL MEDICINE

## 2019-08-13 RX ORDER — PROPOFOL 10 MG/ML
VIAL (ML) INTRAVENOUS CONTINUOUS PRN
Status: DISCONTINUED | OUTPATIENT
Start: 2019-08-13 | End: 2019-08-13

## 2019-08-13 RX ORDER — PROPOFOL 10 MG/ML
VIAL (ML) INTRAVENOUS
Status: DISCONTINUED | OUTPATIENT
Start: 2019-08-13 | End: 2019-08-13

## 2019-08-13 RX ORDER — FENTANYL CITRATE 50 UG/ML
INJECTION, SOLUTION INTRAMUSCULAR; INTRAVENOUS
Status: DISCONTINUED | OUTPATIENT
Start: 2019-08-13 | End: 2019-08-13

## 2019-08-13 RX ORDER — SODIUM CHLORIDE 9 MG/ML
INJECTION, SOLUTION INTRAVENOUS CONTINUOUS
Status: DISCONTINUED | OUTPATIENT
Start: 2019-08-13 | End: 2019-08-13 | Stop reason: HOSPADM

## 2019-08-13 RX ADMIN — SODIUM CHLORIDE: 0.9 INJECTION, SOLUTION INTRAVENOUS at 07:08

## 2019-08-13 RX ADMIN — FENTANYL CITRATE 50 MCG: 50 INJECTION, SOLUTION INTRAMUSCULAR; INTRAVENOUS at 07:08

## 2019-08-13 RX ADMIN — PROPOFOL 30 MG: 10 INJECTION, EMULSION INTRAVENOUS at 07:08

## 2019-08-13 RX ADMIN — PROPOFOL 100 MCG/KG/MIN: 10 INJECTION, EMULSION INTRAVENOUS at 07:08

## 2019-08-13 RX ADMIN — FENTANYL CITRATE 25 MCG: 50 INJECTION, SOLUTION INTRAMUSCULAR; INTRAVENOUS at 08:08

## 2019-08-13 NOTE — [employer] PATIENT INSTRUCTIONS
Discharge Summary/Instructions after an Endoscopic Procedure  Patient Name: Val Bragg  Patient MRN: [id number]  Patient YOB: 1945 Tuesday, August 13, 2019  Jeffrey Maynard MD  RESTRICTIONS:  During your procedure today, you received medications for sedation.  These   medications may affect your judgment, balance and coordination.  Therefore,   for 24 hours, you have the following restrictions:   - DO NOT drive a car, operate machinery, make legal/financial decisions,   sign important papers or drink alcohol.    ACTIVITY:  Today: no heavy lifting, straining or running due to procedural   sedation/anesthesia.  The following day: return to full activity including work.  DIET:  Eat and drink normally unless instructed otherwise.     TREATMENT FOR COMMON SIDE EFFECTS:  - Mild abdominal pain, nausea, belching, bloating or excessive gas:  rest,   eat lightly and use a heating pad.  - Sore Throat: treat with throat lozenges and/or gargle with warm salt   water.  - Because air was used during the procedure, expelling large amounts of air   from your rectum or belching is normal.  - If a bowel prep was taken, you may not have a bowel movement for 1-3 days.    This is normal.  SYMPTOMS TO WATCH FOR AND REPORT TO YOUR PHYSICIAN:  1. Abdominal pain or bloating, other than gas cramps.  2. Chest pain.  3. Back pain.  4. Signs of infection such as: chills or fever occurring within 24 hours   after the procedure.  5. Rectal bleeding, which would show as bright red, maroon, or black stools.   (A tablespoon of blood from the rectum is not serious, especially if   hemorrhoids are present.)  6. Vomiting.  7. Weakness or dizziness.  GO DIRECTLY TO THE NEAREST EMERGENCY ROOM IF YOU HAVE ANY OF THE FOLLOWING:      Difficulty breathing              Chills and/or fever over 101 F   Persistent vomiting and/or vomiting blood   Severe abdominal pain   Severe chest pain   Black, tarry stools   Bleeding- more than one tablespoon   Any  other symptom or condition that you feel may need urgent attention  Your doctor recommends these additional instructions:  If any biopsies were taken, your doctors clinic will contact you in 1 to 2   weeks with any results.  - Discharge patient to home.   - Continue PPI  - Perform a colonoscopy today.   - Refer to an ENT specialist at appointment to be scheduled. Mucus in upper   airway  - The findings and recommendations were discussed with the designated   responsible adult.  For questions, problems or results please call your physician - Jeffrey Maynard MD at Work:  [phone].  [facility], EMERGENCY ROOM PHONE NUMBER: [phone]  IF A COMPLICATION OR EMERGENCY SITUATION ARISES AND YOU ARE UNABLE TO REACH   YOUR PHYSICIAN - GO DIRECTLY TO THE EMERGENCY ROOM.  Jeffrey Maynard MD  8/13/2019 8:08:34 AM  This report has been verified and signed electronically.  [employer]

## 2019-08-13 NOTE — ANESTHESIA PREPROCEDURE EVALUATION
08/13/2019  Val Bragg is a 73 y.o., female.  Past Surgical History:   Procedure Laterality Date    CATARACT EXTRACTION W/  INTRAOCULAR LENS IMPLANT Left 06/05/2018    Dr. Castillo    CHOLECYSTECTOMY      laproscopic cholecystectomy    COLONOSCOPY N/A 10/27/2014    Performed by REYNA Powers [facility] at [facility] (4TH FLR)    HYSTERECTOMY      DENISHA/BSO - fibroids    INSERTION-INTRAOCULAR LENS (IOL) Left 6/5/2018    Performed by Ga Castillo MD at [facility] OR 1ST FLR    Laparoscopic right hemicolectomy      OOPHORECTOMY      PHACOEMULSIFICATION-ASPIRATION-CATARACT Left 6/5/2018    Performed by Ga Castillo MD at [facility] OR 1ST FLR     Past Medical History:   Diagnosis Date    Alternating exotropia 4/18/2013    Bilateral cataracts     Depression     Diabetes mellitus     GERD (gastroesophageal reflux disease)     Hyperlipidemia     Hypertension     Menopausal state     Multiple gastric polyps     on EGD in 2004    Sickle cell trait        Anesthesia Evaluation    I have reviewed the Patient Summary Reports.     I have reviewed the Medications.     Review of Systems  Anesthesia Hx:  No problems with previous Anesthesia  Denies Family Hx of Anesthesia complications.   Denies Personal Hx of Anesthesia complications.   Hematology/Oncology:  Hematology Normal   Oncology Normal     EENT/Dental:EENT/Dental Normal   Cardiovascular:   Hypertension    Pulmonary:   Pneumonia    Renal/:   Chronic Renal Disease    Hepatic/GI:   GERD    Musculoskeletal:  Musculoskeletal Normal    Neurological:  Neurology Normal    Endocrine:   Diabetes    Dermatological:  Skin Normal    Psych:   Psychiatric History          Physical Exam  General:  Well nourished    Airway/Jaw/Neck:  Airway Findings: Mallampati: II TM Distance: 4 - 6 cm        Eyes/Ears/Nose:  EYES/EARS/NOSE FINDINGS: Normal    Dental:  DENTAL FINDINGS: Normal   Chest/Lungs:  Chest/Lungs Clear    Heart/Vascular:  Heart Findings: Normal Heart murmur: negative Vascular Findings: Normal    Abdomen:  Abdomen Findings: Normal    Musculoskeletal:  Musculoskeletal Findings: Normal   Skin:  Skin Findings: Normal    Mental Status:  Mental Status Findings: Normal        Anesthesia Plan  Type of Anesthesia, risks & benefits discussed:  Anesthesia Type:  general  Patient's Preference: General  Intra-op Monitoring Plan: standard ASA monitors  Intra-op Monitoring Plan Comments:   Post Op Pain Control Plan:   Post Op Pain Control Plan Comments:   Induction:   IV  Beta Blocker:  Patient is not currently on a Beta-Blocker (No further documentation required).       Informed Consent: Patient understands risks and agrees with Anesthesia plan.  Questions answered. Anesthesia consent signed with patient.  ASA Score: 3     Day of Surgery Review of History & Physical: I have interviewed and examined the patient. I have reviewed the patient's H&P dated:  There are no significant changes. Significant changes noted: Surgeon notified.  H&P update referred to the surgeon.         Ready For Surgery From Anesthesia Perspective.

## 2019-08-13 NOTE — TRANSFER OF CARE
"Anesthesia Transfer of Care Note    Patient: Val Bragg    Procedure(s) Performed: Procedure(s) (LRB):  ESOPHAGOGASTRODUODENOSCOPY (EGD) (N/A)  COLONOSCOPY (N/A)    Patient location: GI    Anesthesia Type: general    Transport from OR: Transported from OR on 6-10 L/min O2 by face mask with adequate spontaneous ventilation    Post pain: adequate analgesia    Post assessment: tolerated procedure well and no apparent anesthetic complications    Post vital signs: stable    Level of consciousness: awake, alert and oriented    Nausea/Vomiting: no nausea/vomiting    Complications: none    Transfer of care protocol was followed      Last vitals:   Visit Vitals  BP (!) 155/70   Pulse 85   Temp 36.7 °C (98 °F)   Resp 15   Ht 5' 4" (1.626 m)   Wt 83 kg (183 lb)   SpO2 99%   Breastfeeding? No   BMI 31.41 kg/m²     "

## 2019-08-13 NOTE — PROVATION PATIENT INSTRUCTIONS
Discharge Summary/Instructions after an Endoscopic Procedure  Patient Name: Val Bragg  Patient MRN: [id number]  Patient YOB: 1945 Tuesday, August 13, 2019  Jeffrey Maynard MD  RESTRICTIONS:  During your procedure today, you received medications for sedation.  These   medications may affect your judgment, balance and coordination.  Therefore,   for 24 hours, you have the following restrictions:   - DO NOT drive a car, operate machinery, make legal/financial decisions,   sign important papers or drink alcohol.    ACTIVITY:  Today: no heavy lifting, straining or running due to procedural   sedation/anesthesia.  The following day: return to full activity including work.  DIET:  Eat and drink normally unless instructed otherwise.     TREATMENT FOR COMMON SIDE EFFECTS:  - Mild abdominal pain, nausea, belching, bloating or excessive gas:  rest,   eat lightly and use a heating pad.  - Sore Throat: treat with throat lozenges and/or gargle with warm salt   water.  - Because air was used during the procedure, expelling large amounts of air   from your rectum or belching is normal.  - If a bowel prep was taken, you may not have a bowel movement for 1-3 days.    This is normal.  SYMPTOMS TO WATCH FOR AND REPORT TO YOUR PHYSICIAN:  1. Abdominal pain or bloating, other than gas cramps.  2. Chest pain.  3. Back pain.  4. Signs of infection such as: chills or fever occurring within 24 hours   after the procedure.  5. Rectal bleeding, which would show as bright red, maroon, or black stools.   (A tablespoon of blood from the rectum is not serious, especially if   hemorrhoids are present.)  6. Vomiting.  7. Weakness or dizziness.  GO DIRECTLY TO THE NEAREST EMERGENCY ROOM IF YOU HAVE ANY OF THE FOLLOWING:      Difficulty breathing              Chills and/or fever over 101 F   Persistent vomiting and/or vomiting blood   Severe abdominal pain   Severe chest pain   Black, tarry stools   Bleeding- more than one tablespoon   Any  other symptom or condition that you feel may need urgent attention  Your doctor recommends these additional instructions:  If any biopsies were taken, your doctors clinic will contact you in 1 to 2   weeks with any results.  - Patient has a contact number available for emergencies.  The signs and   symptoms of potential delayed complications were discussed with the   patient.  Return to normal activities tomorrow.  Written discharge   instructions were provided to the patient.   - Discharge patient to home.   - Repeat colonoscopy is not recommended due to current age (66 years or   older) for surveillance.   - The findings and recommendations were discussed with the designated   responsible adult.   For questions, problems or results please call your physician - Jeffrey Maynard MD at Work:  [phone].  [facility], EMERGENCY ROOM PHONE NUMBER: [phone]  IF A COMPLICATION OR EMERGENCY SITUATION ARISES AND YOU ARE UNABLE TO REACH   YOUR PHYSICIAN - GO DIRECTLY TO THE EMERGENCY ROOM.  Jeffrey Maynard MD  8/13/2019 8:27:23 AM  This report has been verified and signed electronically.  [employer]

## 2019-08-13 NOTE — H&P
Short Stay Endoscopy History and Physical    PCP - Anh Atkinson MD    Procedure - EGD/Colonoscopy  ASA - per anesthesia  Mallampati - per anesthesia  History of Anesthesia problems - no  Family history Anesthesia problems -  no   Plan of anesthesia - MAC    HPI:  This is a 73 y.o. female here for evaluation of :     EGD - gerd  Colon - polyps    ROS:  Constitutional: No fevers, chills, No weight loss  CV: No chest pain  Pulm: No cough, No shortness of breath  Ophtho: No vision changes  GI: see HPI  Derm: No rash    Medical History:  has a past medical history of Alternating exotropia (4/18/2013), Bilateral cataracts, Depression, Diabetes mellitus, GERD (gastroesophageal reflux disease), Hyperlipidemia, Hypertension, Menopausal state, Multiple gastric polyps, and Sickle cell trait.    Surgical History:  has a past surgical history that includes Laparoscopic right hemicolectomy; Hysterectomy; Oophorectomy; Cholecystectomy; Phacoemulsification of cataract (Left, 6/5/2018); Intraocular prosthesis insertion (Left, 6/5/2018); and Cataract extraction w/  intraocular lens implant (Left, 06/05/2018).    Family History: family history includes Cancer in her brother; Cataracts in her brother and sister; Diabetes in her mother, paternal aunt, and sister; Heart disease in her father; Hypertension in her brother, father, and sister; Stroke in her brother, mother, sister, and sister.. Otherwise no colon cancer, inflammatory bowel disease, or GI malignancies.    Social History:  reports that she has never smoked. She has never used smokeless tobacco. She reports that she does not drink alcohol or use drugs.    Review of patient's allergies indicates:   Allergen Reactions    Darvocet a500 [propoxyphene n-acetaminophen] Anaphylaxis       Medications:   Medications Prior to Admission   Medication Sig Dispense Refill Last Dose    diazePAM (VALIUM) 5 MG tablet TAKE 1 TABLET BY MOUTH EVERY NIGHT AT BEDTIME 30 tablet 2 Past Week at  Unknown time    FLUoxetine 40 MG capsule TAKE 1 CAPSULE BY MOUTH EVERY DAY 90 capsule 0 Past Week at Unknown time    hydroCHLOROthiazide (HYDRODIURIL) 25 MG tablet TAKE 1 TABLET BY MOUTH EVERY DAY 90 tablet 0 Past Week at Unknown time    irbesartan (AVAPRO) 150 MG tablet TAKE 1 TABLET(150 MG) BY MOUTH EVERY EVENING 90 tablet 3 Past Week at Unknown time    lovastatin (MEVACOR) 40 MG tablet Take 1 tablet (40 mg total) by mouth nightly. 30 tablet 6 Past Week at Unknown time    meloxicam (MOBIC) 7.5 MG tablet Take 1 tablet (7.5 mg total) by mouth once daily. 30 tablet 3 Past Week at Unknown time    metFORMIN (GLUCOPHAGE) 500 MG tablet TAKE 1 TABLET(500 MG) BY MOUTH TWICE DAILY 60 tablet 3 Past Week at Unknown time    multivitamin capsule Take 1 capsule by mouth once daily.   Past Week at Unknown time    NIFEdipine (PROCARDIA-XL) 90 MG (OSM) 24 hr tablet TAKE 1 TABLET BY MOUTH EVERY DAY 30 tablet 0 8/13/2019 at 0600    NIFEdipine (PROCARDIA-XL) 90 MG (OSM) 24 hr tablet TAKE 1 TABLET(90 MG) BY MOUTH EVERY DAY 90 tablet 0 8/13/2019 at 0600    pantoprazole (PROTONIX) 40 MG tablet TAKE 1 TABLET BY MOUTH ONCE DAILY 90 tablet 0 Past Week at Unknown time    polycarbophil (FIBERCON) 625 mg tablet Take 625 mg by mouth once daily.   Past Month at Unknown time    traZODone (DESYREL) 150 MG tablet TAKE 1 TABLET BY MOUTH EVERY NIGHT AT BEDTIME 90 tablet 0 Past Week at Unknown time    blood sugar diagnostic Strp 1 strip by Misc.(Non-Drug; Combo Route) route once daily. 100 strip 4 Taking    hydroCHLOROthiazide (HYDRODIURIL) 25 MG tablet TAKE 1 TABLET BY MOUTH EVERY DAY 30 tablet 3     mometasone (NASONEX) 50 mcg/actuation nasal spray 2 sprays by Nasal route every evening. 1 each 3 Unknown at Unknown time       Physical Exam:    Vital Signs: see nursing notes    General Appearance: Well appearing in no acute distress  Eyes:    No scleral icterus  ENT: Neck supple, Lips, mucosa, and tongue normal; teeth and gums  normal  Lungs: CTA anteriorly  Heart:  Regular rate, S1, S2 normal, no murmurs heard.  Abdomen: Soft, non tender, non distended with normal bowel sounds. No hepatosplenomegaly, ascites, or mass.  Extremities: No edema  Skin: No rash    Labs:  Lab Results   Component Value Date    WBC 16.84 (H) 03/20/2019    HGB 11.7 (L) 03/20/2019    HCT 36.3 (L) 03/20/2019     03/20/2019    CHOL 133 12/28/2018    TRIG 116 12/28/2018    HDL 43 12/28/2018    ALT 24 03/20/2019    AST 34 03/20/2019     03/20/2019    K 3.4 (L) 03/20/2019     03/20/2019    CREATININE 1.1 03/20/2019    BUN 10 03/20/2019    CO2 30 (H) 03/20/2019    TSH 1.253 12/28/2018    INR 0.9 04/10/2008    GLUF 106 11/18/2004    HGBA1C 7.3 (H) 12/28/2018       I have explained the risks and benefits of endoscopy procedures to the patient including but not limited to bleeding, perforation, infection, and death.  The patient was asked if they understand and allowed to ask any further questions to their satisfaction.    Jeffrey Maynard MD

## 2019-08-13 NOTE — ANESTHESIA POSTPROCEDURE EVALUATION
Anesthesia Post Evaluation    Patient: Val Bragg    Procedure(s) Performed: Procedure(s) (LRB):  ESOPHAGOGASTRODUODENOSCOPY (EGD) (N/A)  COLONOSCOPY (N/A)    Final Anesthesia Type: general  Patient location during evaluation: GI PACU  Patient participation: Yes- Able to Participate  Level of consciousness: awake and alert and oriented  Post-procedure vital signs: reviewed and stable  Pain management: adequate  Airway patency: patent  PONV status at discharge: No PONV  Anesthetic complications: no      Cardiovascular status: blood pressure returned to baseline and hemodynamically stable  Respiratory status: unassisted, spontaneous ventilation and room air  Hydration status: euvolemic  Follow-up not needed.          Vitals Value Taken Time   /77 8/13/2019  8:53 AM   Temp 36.7 °C (98 °F) 8/13/2019  8:29 AM   Pulse 79 8/13/2019  8:53 AM   Resp 15 8/13/2019  8:53 AM   SpO2 96 % 8/13/2019  8:53 AM         Event Time     Out of Recovery 09:04:34          Pain/Albin Score: Albin Score: 10 (8/13/2019  8:30 AM)

## 2019-08-14 RX ORDER — FLUOXETINE HYDROCHLORIDE 40 MG/1
CAPSULE ORAL
Qty: 90 CAPSULE | Refills: 0 | Status: SHIPPED | OUTPATIENT
Start: 2019-08-14 | End: 2019-10-24 | Stop reason: SDUPTHER

## 2019-08-15 DIAGNOSIS — E11.9 TYPE 2 DIABETES MELLITUS WITHOUT COMPLICATION: ICD-10-CM

## 2019-08-20 ENCOUNTER — TELEPHONE (OUTPATIENT)
Dept: ENDOSCOPY | Facility: HOSPITAL | Age: 74
End: 2019-08-20

## 2019-08-30 RX ORDER — PANTOPRAZOLE SODIUM 40 MG/1
TABLET, DELAYED RELEASE ORAL
Qty: 90 TABLET | Refills: 0 | Status: SHIPPED | OUTPATIENT
Start: 2019-08-30 | End: 2019-10-24 | Stop reason: SDUPTHER

## 2019-08-31 ENCOUNTER — EXTERNAL CHRONIC CARE MANAGEMENT (OUTPATIENT)
Dept: PRIMARY CARE CLINIC | Facility: CLINIC | Age: 74
End: 2019-08-31
Payer: MEDICARE

## 2019-08-31 PROCEDURE — 99490 CHRNC CARE MGMT STAFF 1ST 20: CPT | Mod: S$PBB,,, | Performed by: INTERNAL MEDICINE

## 2019-08-31 PROCEDURE — 99490 CHRNC CARE MGMT STAFF 1ST 20: CPT | Mod: PBBFAC,PO | Performed by: INTERNAL MEDICINE

## 2019-08-31 PROCEDURE — 99490 PR CHRONIC CARE MGMT, 1ST 20 MIN: ICD-10-PCS | Mod: S$PBB,,, | Performed by: INTERNAL MEDICINE

## 2019-09-02 RX ORDER — METFORMIN HYDROCHLORIDE 500 MG/1
TABLET ORAL
Qty: 60 TABLET | Refills: 0 | Status: SHIPPED | OUTPATIENT
Start: 2019-09-02 | End: 2019-10-05 | Stop reason: SDUPTHER

## 2019-09-09 RX ORDER — TRAZODONE HYDROCHLORIDE 150 MG/1
TABLET ORAL
Qty: 90 TABLET | Refills: 0 | Status: SHIPPED | OUTPATIENT
Start: 2019-09-09 | End: 2019-10-24 | Stop reason: SDUPTHER

## 2019-09-10 ENCOUNTER — PATIENT OUTREACH (OUTPATIENT)
Dept: ADMINISTRATIVE | Facility: OTHER | Age: 74
End: 2019-09-10

## 2019-09-11 ENCOUNTER — TELEPHONE (OUTPATIENT)
Dept: SPINE | Facility: CLINIC | Age: 74
End: 2019-09-11

## 2019-09-11 DIAGNOSIS — M54.5 LOW BACK PAIN, UNSPECIFIED BACK PAIN LATERALITY, UNSPECIFIED CHRONICITY, WITH SCIATICA PRESENCE UNSPECIFIED: Primary | ICD-10-CM

## 2019-09-12 ENCOUNTER — OFFICE VISIT (OUTPATIENT)
Dept: SPINE | Facility: CLINIC | Age: 74
End: 2019-09-12
Payer: MEDICARE

## 2019-09-12 ENCOUNTER — HOSPITAL ENCOUNTER (OUTPATIENT)
Dept: RADIOLOGY | Facility: OTHER | Age: 74
Discharge: HOME | End: 2019-09-12
Attending: PHYSICIAN ASSISTANT
Payer: MEDICARE

## 2019-09-12 VITALS
HEIGHT: 64 IN | DIASTOLIC BLOOD PRESSURE: 82 MMHG | TEMPERATURE: 99 F | WEIGHT: 185 LBS | BODY MASS INDEX: 31.58 KG/M2 | SYSTOLIC BLOOD PRESSURE: 150 MMHG | HEART RATE: 82 BPM

## 2019-09-12 DIAGNOSIS — M54.41 CHRONIC BILATERAL LOW BACK PAIN WITH BILATERAL SCIATICA: Primary | ICD-10-CM

## 2019-09-12 DIAGNOSIS — M54.16 LUMBAR RADICULOPATHY: ICD-10-CM

## 2019-09-12 DIAGNOSIS — M54.42 CHRONIC BILATERAL LOW BACK PAIN WITH BILATERAL SCIATICA: Primary | ICD-10-CM

## 2019-09-12 DIAGNOSIS — M54.5 LOW BACK PAIN, UNSPECIFIED BACK PAIN LATERALITY, UNSPECIFIED CHRONICITY, WITH SCIATICA PRESENCE UNSPECIFIED: ICD-10-CM

## 2019-09-12 DIAGNOSIS — M43.16 SPONDYLOLISTHESIS, LUMBAR REGION: ICD-10-CM

## 2019-09-12 DIAGNOSIS — M47.26 OTHER SPONDYLOSIS WITH RADICULOPATHY, LUMBAR REGION: ICD-10-CM

## 2019-09-12 DIAGNOSIS — G89.29 CHRONIC BILATERAL LOW BACK PAIN WITH BILATERAL SCIATICA: Primary | ICD-10-CM

## 2019-09-12 DIAGNOSIS — M51.36 DDD (DEGENERATIVE DISC DISEASE), LUMBAR: ICD-10-CM

## 2019-09-12 PROCEDURE — 99215 OFFICE O/P EST HI 40 MIN: CPT | Mod: PBBFAC,25 | Performed by: PHYSICIAN ASSISTANT

## 2019-09-12 PROCEDURE — 72120 X-RAY BEND ONLY L-S SPINE: CPT | Mod: 26,,, | Performed by: RADIOLOGY

## 2019-09-12 PROCEDURE — 72100 X-RAY EXAM L-S SPINE 2/3 VWS: CPT | Mod: 26,,, | Performed by: RADIOLOGY

## 2019-09-12 PROCEDURE — 72120 XR LUMBAR SPINE AP AND LAT WITH FLEX/EXT: ICD-10-PCS | Mod: 26,,, | Performed by: RADIOLOGY

## 2019-09-12 PROCEDURE — 99999 PR PBB SHADOW E&M-EST. PATIENT-LVL V: CPT | Mod: PBBFAC,,, | Performed by: PHYSICIAN ASSISTANT

## 2019-09-12 PROCEDURE — 72120 X-RAY BEND ONLY L-S SPINE: CPT | Mod: TC,FY

## 2019-09-12 PROCEDURE — 99204 PR OFFICE/OUTPT VISIT, NEW, LEVL IV, 45-59 MIN: ICD-10-PCS | Mod: S$PBB,,, | Performed by: PHYSICIAN ASSISTANT

## 2019-09-12 PROCEDURE — 99204 OFFICE O/P NEW MOD 45 MIN: CPT | Mod: S$PBB,,, | Performed by: PHYSICIAN ASSISTANT

## 2019-09-12 PROCEDURE — 72100 XR LUMBAR SPINE AP AND LAT WITH FLEX/EXT: ICD-10-PCS | Mod: 26,,, | Performed by: RADIOLOGY

## 2019-09-12 PROCEDURE — 99999 PR PBB SHADOW E&M-EST. PATIENT-LVL V: ICD-10-PCS | Mod: PBBFAC,,, | Performed by: PHYSICIAN ASSISTANT

## 2019-09-12 RX ORDER — GABAPENTIN 300 MG/1
CAPSULE ORAL
Qty: 270 CAPSULE | Refills: 0 | Status: SHIPPED | OUTPATIENT
Start: 2019-09-12 | End: 2019-12-12 | Stop reason: ALTCHOICE

## 2019-09-12 RX ORDER — DICLOFENAC SODIUM 75 MG/1
75 TABLET, DELAYED RELEASE ORAL 2 TIMES DAILY PRN
Qty: 180 TABLET | Refills: 0 | Status: SHIPPED | OUTPATIENT
Start: 2019-09-12 | End: 2019-09-26

## 2019-09-12 RX ORDER — BENZONATATE 100 MG/1
100 CAPSULE ORAL 3 TIMES DAILY PRN
COMMUNITY
End: 2019-12-12

## 2019-09-12 NOTE — PROGRESS NOTES
Subjective:     Patient ID:  Val Bragg is a 73 y.o. female.    [facility]    Chief Complaint:  Low back pain and bilateral leg pain    HPI    Val Bragg is a 73 y.o. female who presents with the above CC.  Low back and leg pain has been present for many years.  Low back pain is across the low back rated 10/10 and is worse with standing and walking and better with sitting.  Pain radiates down the bilateral anterior thighs to the knees rated 4/10 that is worse with walking and no leg pain with sitting.    The back pain is worse than the leg pain.  The right and left leg pain is equal.    Patient has not had PT or ESIs.  No spine surgery.  Patient is currently taking mobic, valium, and aleve.    Patient denies any recent accidents or trauma, no saddle anesthesias, and no bowel or bladder incontinence.      Review of Systems:    Review of Systems   Constitutional: Negative for chills, diaphoresis, fever, malaise/fatigue and weight loss.   HENT: Negative for congestion, ear discharge, ear pain, hearing loss, nosebleeds, sinus pain, sore throat and tinnitus.    Eyes: Negative for blurred vision, double vision, photophobia, pain, discharge and redness.   Respiratory: Positive for cough. Negative for hemoptysis, sputum production, shortness of breath, wheezing and stridor.    Cardiovascular: Positive for leg swelling. Negative for chest pain, palpitations, orthopnea and PND.   Gastrointestinal: Positive for heartburn. Negative for abdominal pain, blood in stool, constipation, diarrhea, melena, nausea and vomiting.   Genitourinary: Negative for dysuria, flank pain, frequency, hematuria and urgency.   Musculoskeletal: Positive for back pain. Negative for falls, joint pain, myalgias and neck pain.   Skin: Negative for itching and rash.   Neurological: Negative for dizziness, tingling, tremors, sensory change, speech change, seizures, loss of consciousness, weakness and headaches.   Endo/Heme/Allergies: Negative for  environmental allergies and polydipsia. Does not bruise/bleed easily.   Psychiatric/Behavioral: Positive for depression. Negative for hallucinations, memory loss and substance abuse. The patient is not nervous/anxious and does not have insomnia.          Past Medical History:   Diagnosis Date    Alternating exotropia 4/18/2013    Bilateral cataracts     Depression     Diabetes mellitus     GERD (gastroesophageal reflux disease)     Hyperlipidemia     Hypertension     Menopausal state     Multiple gastric polyps     on EGD in 2004    Sickle cell trait      Past Surgical History:   Procedure Laterality Date    CATARACT EXTRACTION W/  INTRAOCULAR LENS IMPLANT Left 06/05/2018    Dr. Castillo    CHOLECYSTECTOMY      laproscopic cholecystectomy    COLONOSCOPY N/A 8/13/2019    [facility]formed [facility] (4TH FLR)    COLONOSCOPY N/A 10/27/2014    Performed by REYNA Powers [facility] at [facility] ENDO (4TH FLR)    ESOPHAGOGASTRODUODENOSCOPY (EGD) N/A 8/13/2019    Per[facility] at [facility] ENDO (4TH FLR)    HYSTERECTOMY      DENISHA/BSO - fibroids    INSERTION-INTRAOCULAR LENS (IOL) Left 6/5/2018    Performed by Ga [facility]illo [facility] at [facility] OR 1ST FLR    Laparoscopic right hemicolectomy      OOPHORECTOMY      PHACOEMULSIFICATION-ASPIRATION-CATARACT Left 6/5/2018    Performed by Ga [facility]illo MD at [facility] OR 1ST FLR     Current Outpatient Medications on File Prior to Visit   Medication Sig Dispense Refill    benzonatate (TESSALON) 100 MG capsule Take 100 mg by mouth 3 (three) times daily as needed for Cough.      blood sugar diagnostic Strp 1 strip by Misc.(Non-Drug; Combo Route) route once daily. 100 strip 4    diazePAM (VALIUM) 5 MG tablet TAKE 1 TABLET BY MOUTH EVERY NIGHT AT BEDTIME 30 tablet 2    FLUoxetine 40 MG capsule TAKE 1 CAPSULE BY MOUTH EVERY DAY 90 capsule 0    hydroCHLOROthiazide (HYDRODIURIL) 25 MG tablet TAKE 1 TABLET BY MOUTH EVERY DAY 90 tablet 0    irbesartan  (AVAPRO) 150 MG tablet TAKE 1 TABLET(150 MG) BY MOUTH EVERY EVENING 90 tablet 3    lovastatin (MEVACOR) 40 MG tablet Take 1 tablet (40 mg total) by mouth nightly. 30 tablet 6    meloxicam (MOBIC) 7.5 MG tablet Take 1 tablet (7.5 mg total) by mouth once daily. 30 tablet 3    metFORMIN (GLUCOPHAGE) 500 MG tablet TAKE 1 TABLET(500 MG) BY MOUTH TWICE DAILY 60 tablet 0    multivitamin capsule Take 1 capsule by mouth once daily.      NIFEdipine (PROCARDIA-XL) 90 MG (OSM) 24 hr tablet TAKE 1 TABLET BY MOUTH EVERY DAY 30 tablet 0    pantoprazole (PROTONIX) 40 MG tablet TAKE 1 TABLET BY MOUTH ONCE DAILY 90 tablet 0    traZODone (DESYREL) 150 MG tablet TAKE 1 TABLET BY MOUTH EVERY NIGHT AT BEDTIME 90 tablet 0    mometasone (NASONEX) 50 mcg/actuation nasal spray 2 sprays by Nasal route every evening. 1 each 3    NIFEdipine (PROCARDIA-XL) 90 MG (OSM) 24 hr tablet TAKE 1 TABLET(90 MG) BY MOUTH EVERY DAY 90 tablet 0    polycarbophil (FIBERCON) 625 mg tablet Take 625 mg by mouth once daily.       No current facility-administered medications on file prior to visit.      Review of patient's allergies indicates:   Allergen Reactions    Darvocet a500 [propoxyphene n-acetaminophen] Anaphylaxis     Social History     Socioeconomic History    Marital status:      Spouse name: Not on file    Number of children: Not on file    Years of education: Not on file    Highest education level: Not on file   Occupational History    Not on file   Social Needs    Financial resource strain: Not on file    Food insecurity:     Worry: Not on file     Inability: Not on file    Transportation needs:     Medical: Not on file     Non-medical: Not on file   Tobacco Use    Smoking status: Never Smoker    Smokeless tobacco: Never Used   Substance and Sexual Activity    Alcohol use: No    Drug use: No    Sexual activity: Not Currently     Partners: Male     Birth control/protection: None   Lifestyle    Physical activity:     Days  "per week: Not on file     Minutes per session: Not on file    Stress: Not on file   Relationships    Social connections:     Talks on phone: Not on file     Gets together: Not on file     Attends Bahai service: Not on file     Active member of club or organization: Not on file     Attends meetings of clubs or organizations: Not on file     Relationship status: Not on file   Other Topics Concern    Are you pregnant or think you may be? Not Asked    Breast-feeding Not Asked   Social History Narrative    Not on file     Family History   Problem Relation Age of Onset    Diabetes Mother     Stroke Mother     Hypertension Father     Heart disease Father     Hypertension Sister     Diabetes Sister     Cataracts Sister     Stroke Sister     Hypertension Brother     Cancer Brother         bladder    Cataracts Brother     Stroke Brother     Diabetes Paternal Aunt     Stroke Sister     Colon cancer Sister 60    Breast cancer Neg Hx     Ovarian cancer Neg Hx     Melanoma Neg Hx     Amblyopia Neg Hx     Blindness Neg Hx     Glaucoma Neg Hx     Macular degeneration Neg Hx     Retinal detachment Neg Hx     Strabismus Neg Hx     Thyroid disease Neg Hx     Esophageal cancer Neg Hx        Objective:      Vitals:    09/12/19 1319   BP: (!) 150/82   Pulse: 82   Temp: 98.6 °F (37 °C)   Weight: 83.9 kg (185 lb)   Height: 5' 4" (1.626 m)   PainSc:   9   PainLoc: Back         Physical Exam:    General:  Val Bragg is well-developed, well-nourished, appears stated age, in no acute distress, alert and oriented to person, place, and time.    Pulmonary/Chest:  Respiratory effort normal  Abdominal: Exhibits no distension  Psychiatric:  Normal mood and affect.  Behavior is normal.  Judgement and thought content normal    Musculoskeletal:    Patient arises from a sitting to standing position without difficulty.  Patient walks to the door without evidence of limp, pain, or abnormality of gait.     Lumbar ROM: "   No pain in lumbar flexion, extension.  Mild pain in right lateral bending, and left lateral bending.    Lumbar Spine Inspection:  Normal with no surgical scars and no visible rashes.    Lumbar Spine Palpation:  No tenderness to low back palpation.    SI Joint Palpation:  No tenderness to SI Joint palpation.    Straight Leg Raise:  Negative right and left SLR.    Neurological: Alert and oriented to person, place, and time    Muscle strength against resistance:     Right Left   Hip flexion  5 / 5 5 / 5   Hip extension 5 / 5 5 / 5   Hip abduction 5 / 5 5 / 5   Hip adduction  5 / 5 5 / 5   Knee extension  5 / 5 5 / 5   Knee flexion 5 / 5 5 / 5   Dorsiflexion  5 / 5 5 / 5   EHL  5 / 5 5 / 5   Plantar flexion  5 / 5 5 / 5   Inversion of the feet 5 / 5 5 / 5   Eversion of the feet  5 / 5 5 / 5     Reflexes:     Right Left   Patellar 2+ 2+   Achilles 2+ 2+     Clonus:  Negative bilaterally    On gross examination of the bilateral upper extremities, patient has full painfree ROM with no signs of clubbing, cyanosis, edema, or weakness.     XRAY Interpretation:     Lumbar spine ap/lateral/flexion/extension xrays were personally reviewed today.  No fractures.  No movement on flexion and extension. Multilevel DDD and spondylosis.  Grade one spondylolisthesis L4-5.      Assessment:          1. Chronic bilateral low back pain with bilateral sciatica    2. DDD (degenerative disc disease), lumbar    3. Other spondylosis with radiculopathy, lumbar region    4. Lumbar radiculopathy    5. Spondylolisthesis, lumbar region            Plan:          Orders Placed This Encounter    Ambulatory Referral to Physical/Occupational Therapy    gabapentin (NEURONTIN) 300 MG capsule    diclofenac (VOLTAREN) 75 MG EC tablet     Grade one spondylolisthesis L4-5.  Multilevel DDD and spondylosis    -Dc mobic and aleve  -Diclofenac PRN with food  -Neurontin now  -PT through [facility]  -FU in three months and if needed will get MRI lumbar  spine      Follow-Up:  Follow up in about 3 months (around 12/12/2019). If there are any questions prior to this, the patient was instructed to contact the office.       RUIZ Medina, PA-C  Neurosurgery  [facility]  [facility]

## 2019-09-26 ENCOUNTER — OFFICE VISIT (OUTPATIENT)
Dept: INTERNAL MEDICINE | Facility: CLINIC | Age: 74
End: 2019-09-26
Payer: MEDICARE

## 2019-09-26 VITALS
SYSTOLIC BLOOD PRESSURE: 142 MMHG | DIASTOLIC BLOOD PRESSURE: 80 MMHG | RESPIRATION RATE: 16 BRPM | HEIGHT: 64 IN | TEMPERATURE: 99 F | BODY MASS INDEX: 32.82 KG/M2 | WEIGHT: 192.25 LBS | HEART RATE: 80 BPM

## 2019-09-26 DIAGNOSIS — M54.5 CHRONIC LOW BACK PAIN, UNSPECIFIED BACK PAIN LATERALITY, WITH SCIATICA PRESENCE UNSPECIFIED: Primary | ICD-10-CM

## 2019-09-26 DIAGNOSIS — I10 ESSENTIAL HYPERTENSION: ICD-10-CM

## 2019-09-26 DIAGNOSIS — N18.30 CKD (CHRONIC KIDNEY DISEASE) STAGE 3, GFR 30-59 ML/MIN: ICD-10-CM

## 2019-09-26 DIAGNOSIS — G89.29 CHRONIC LOW BACK PAIN, UNSPECIFIED BACK PAIN LATERALITY, WITH SCIATICA PRESENCE UNSPECIFIED: Primary | ICD-10-CM

## 2019-09-26 PROCEDURE — 99213 OFFICE O/P EST LOW 20 MIN: CPT | Mod: PBBFAC,PO | Performed by: HOSPITALIST

## 2019-09-26 PROCEDURE — 99999 PR PBB SHADOW E&M-EST. PATIENT-LVL III: ICD-10-PCS | Mod: PBBFAC,,, | Performed by: HOSPITALIST

## 2019-09-26 PROCEDURE — 99213 OFFICE O/P EST LOW 20 MIN: CPT | Mod: S$PBB,,, | Performed by: HOSPITALIST

## 2019-09-26 PROCEDURE — 99213 PR OFFICE/OUTPT VISIT, EST, LEVL III, 20-29 MIN: ICD-10-PCS | Mod: S$PBB,,, | Performed by: HOSPITALIST

## 2019-09-26 PROCEDURE — 90662 IIV NO PRSV INCREASED AG IM: CPT | Mod: PBBFAC,PO

## 2019-09-26 PROCEDURE — 99999 PR PBB SHADOW E&M-EST. PATIENT-LVL III: CPT | Mod: PBBFAC,,, | Performed by: HOSPITALIST

## 2019-09-26 RX ORDER — MELOXICAM 7.5 MG/1
7.5 TABLET ORAL 2 TIMES DAILY PRN
Qty: 60 TABLET | Refills: 3 | Status: SHIPPED | OUTPATIENT
Start: 2019-09-26 | End: 2019-12-12 | Stop reason: ALTCHOICE

## 2019-09-26 NOTE — PROGRESS NOTES
"Subjective:     @Patient ID: Val Bragg is a 73 y.o. female.    Chief Complaint: Follow-up    HPI  74 yo F presents to discuss medications for her chronic back pain. Recently seen by neurosurgery on 9/10/19 and given gabapentin and diclofenac. Pt reports she was worried about taking diclofenac as she read possible side effects with heart issues. She already takes mobic and aleve prn. Reports she has chronic back pain and is concerned about different medications. Does not want narcotics as concerned it will make her hooked on them. Has tried CBD oil to some relief. She has not also tried gabapentin that was recommended     Review of Systems   Constitutional: Negative for chills and fever.   HENT: Negative for congestion and sore throat.    Eyes: Negative for pain and visual disturbance.   Respiratory: Negative for cough and shortness of breath.    Cardiovascular: Negative for chest pain and leg swelling.   Gastrointestinal: Negative for abdominal pain, nausea and vomiting.   Endocrine: Negative for polydipsia and polyuria.   Genitourinary: Negative for difficulty urinating and dysuria.   Musculoskeletal: Positive for arthralgias and back pain.   Skin: Negative for rash and wound.   Neurological: Negative for dizziness, weakness and headaches.   Psychiatric/Behavioral: Negative for agitation and confusion.     Past medical history, surgical history, and family medical history reviewed and updated as appropriate.    Medications and allergies reviewed.     Objective:     Vitals:    09/26/19 1403 09/26/19 1445   BP: (!) 146/70 (!) 142/80   Pulse: 80    Resp: 16    Temp: 98.6 °F (37 °C)    TempSrc: Oral    Weight: 87.2 kg (192 lb 3.9 oz)    Height: 5' 4" (1.626 m)      Body mass index is 33 kg/m².  Physical Exam   Constitutional: She is oriented to person, place, and time. She appears well-developed and well-nourished. No distress.   HENT:   Head: Normocephalic and atraumatic.   Eyes: Conjunctivae are normal. Right " eye exhibits no discharge. Left eye exhibits no discharge.   Neck: Normal range of motion. Neck supple.   Cardiovascular: Normal rate, regular rhythm and intact distal pulses. Exam reveals no friction rub.   No murmur heard.  Pulmonary/Chest: Effort normal and breath sounds normal.   Abdominal: Soft. Bowel sounds are normal. She exhibits no distension. There is no tenderness. There is no guarding.   Musculoskeletal: She exhibits no edema.   Neurological: She is alert and oriented to person, place, and time.   Skin: Skin is warm and dry.   Psychiatric: She has a normal mood and affect. Her behavior is normal.   Vitals reviewed.      Lab Results   Component Value Date    WBC 16.84 (H) 03/20/2019    HGB 11.7 (L) 03/20/2019    HCT 36.3 (L) 03/20/2019     03/20/2019    CHOL 133 12/28/2018    TRIG 116 12/28/2018    HDL 43 12/28/2018    ALT 24 03/20/2019    AST 34 03/20/2019     03/20/2019    K 3.4 (L) 03/20/2019     03/20/2019    CREATININE 1.1 03/20/2019    BUN 10 03/20/2019    CO2 30 (H) 03/20/2019    TSH 1.253 12/28/2018    INR 0.9 04/10/2008    GLUF 106 11/18/2004    HGBA1C 7.3 (H) 12/28/2018       Assessment:     1. Chronic low back pain, unspecified back pain laterality, with sciatica presence unspecified    2. Essential hypertension    3. CKD (chronic kidney disease) stage 3, GFR 30-59 ml/min      Plan:   Val was seen today for follow-up.    Diagnoses and all orders for this visit:    Chronic low back pain, unspecified back pain laterality, with sciatica presence unspecified      - Ok to d/c diclofenac as pt concerned. Will increase mobic to bidprn. Pt to try gabapentin as prescribed by surgery. Will have to monitor renal function while on NSAIDs. Pt declines physical therapy    Essential hypertension       - BP elevated likely due to pain     CKD3        - Renal function appears stable. Continue to monitor periodically as pt on nsaids    Other orders  -     meloxicam (MOBIC) 7.5 MG tablet;  Take 1 tablet (7.5 mg total) by mouth 2 (two) times daily as needed for Pain.  -     Influenza - High Dose (65+) (PF) (IM)          Follow up if symptoms worsen or fail to improve.    Karli Osei MD  Internal Medicine    9/26/2019

## 2019-09-30 ENCOUNTER — EXTERNAL CHRONIC CARE MANAGEMENT (OUTPATIENT)
Dept: PRIMARY CARE CLINIC | Facility: CLINIC | Age: 74
End: 2019-09-30
Payer: MEDICARE

## 2019-09-30 PROCEDURE — 99490 CHRNC CARE MGMT STAFF 1ST 20: CPT | Mod: PBBFAC,PO | Performed by: INTERNAL MEDICINE

## 2019-09-30 PROCEDURE — 99490 PR CHRONIC CARE MGMT, 1ST 20 MIN: ICD-10-PCS | Mod: S$PBB,,, | Performed by: INTERNAL MEDICINE

## 2019-09-30 PROCEDURE — 99490 CHRNC CARE MGMT STAFF 1ST 20: CPT | Mod: S$PBB,,, | Performed by: INTERNAL MEDICINE

## 2019-10-07 RX ORDER — METFORMIN HYDROCHLORIDE 500 MG/1
TABLET ORAL
Qty: 60 TABLET | Refills: 0 | Status: SHIPPED | OUTPATIENT
Start: 2019-10-07 | End: 2019-11-03 | Stop reason: SDUPTHER

## 2019-10-11 ENCOUNTER — TELEPHONE (OUTPATIENT)
Dept: INTERNAL MEDICINE | Facility: CLINIC | Age: 74
End: 2019-10-11

## 2019-10-11 NOTE — TELEPHONE ENCOUNTER
----- Message from Betsy Daniel LPN sent at 10/11/2019  2:39 PM CDT -----  Please see message below from Guido Miguel:    Pt states Mobic and Gabapentin are not controlling her back pain. Pt states pain increase with movement and she is not able to function and be as mobile as she would like due to pain. Pt requesting stronger pain medication. Informed patient that an earlier visit with Physician would likely be required. Please advise.     Thanks,   Gina Wilcox LPN   [facility] Coordinator   Phone [phone] ext [phone]

## 2019-10-14 RX ORDER — NIFEDIPINE 90 MG/1
TABLET, EXTENDED RELEASE ORAL
Qty: 90 TABLET | Refills: 3 | Status: SHIPPED | OUTPATIENT
Start: 2019-10-14 | End: 2020-10-17

## 2019-10-16 ENCOUNTER — TELEPHONE (OUTPATIENT)
Dept: INTERNAL MEDICINE | Facility: CLINIC | Age: 74
End: 2019-10-16

## 2019-10-16 NOTE — TELEPHONE ENCOUNTER
Spoke with the patient and she advised that she is still in pain and she wanted to scheduled the appt with the PCP as she wants a stronger medication appt was scheduled/ Termed the call

## 2019-10-23 NOTE — PROGRESS NOTES
CC: followup of back pain  HPI:  The patient is a 73 y.o. year old female who presents to the office for followup of back pain.  She has been seen in spine clinic.  Physical therapy was ordered, but she states she has been unable to start.  The patient states she is unable to stand or walk for prolonged periods.  She is having difficulty cooking.  She complains of a constant heaviness that radiates down her legs.  Pain is 7-8/10.  She did not take diclofenac, so meloxicam dose was increased.  She has increased dose of gabapentin.    The patient denies any chest pain, shortness of breath, headache, blurred vision, excessive fatigue, nausea or vomiting.     PAST MEDICAL HISTORY:  Past Medical History:   Diagnosis Date    Alternating exotropia 4/18/2013    Bilateral cataracts     Depression     Diabetes mellitus     GERD (gastroesophageal reflux disease)     Hyperlipidemia     Hypertension     Menopausal state     Multiple gastric polyps     on EGD in 2004    Sickle cell trait        SURGICAL HISTORY:  Past Surgical History:   Procedure Laterality Date    CATARACT EXTRACTION W/  INTRAOCULAR LENS IMPLANT Left 06/05/2018    Dr. Castillo    CHOLECYSTECTOMY      laproscopic cholecystectomy    COLONOSCOPY N/A 8/13/2019    Procedure: COLONOSCOPY;  Surgeon: Jeffrey Maynard MD;  Location: [address]);  Service: Endoscopy;  Laterality: N/A;    ESOPHAGOGASTRODUODENOSCOPY N/A 8/13/2019    Procedure: ESOPHAGOGASTRODUODENOSCOPY (EGD);  Surgeon: Jeffrey Maynard MD;  [facility]  Service: Endoscopy;  Laterality: N/A;    HYSTERECTOMY      DENISHA/BSO - fibroids    INTRAOCULAR PROSTHESES INSERTION Left 6/5/2018    Procedure: INSERTION-INTRAOCULAR LENS (IOL);  Surgeon: Ga Castillo MD;  Location: [address];  Service: Ophthalmology;  Laterality: Left;    Laparoscopic right hemicolectomy      OOPHORECTOMY      PHACOEMULSIFICATION OF CATARACT Left 6/5/2018    Procedure:  PHACOEMULSIFICATION-ASPIRATION-CATARACT;  Surgeon: Ga Castillo MD;  [facility] OR [address] [place] [address];  Service: Ophthalmology;  Laterality: Left;       MEDS:  Medcard reviewed and updated    ALLERGIES: Allergy Card reviewed and updated    SOCIAL HISTORY:   The patient is a nonsmoker.    PE:   APPEARANCE: Well nourished, well developed, in no acute distress.    CHEST: Lungs clear to auscultation with unlabored respirations.  CARDIOVASCULAR: Normal S1, S2. No murmurs. No carotid bruits. No pedal edema.  ABDOMEN: Bowel sounds normal. Not distended. Soft. No tenderness or masses.  MUSCULOSKELETAL:  Normal gait, no tenderness to palpation.  PSYCHIATRIC: The patient is oriented to person, place, and time and has a pleasant affect.        ASSESSMENT/PLAN:  Val was seen today for pain and symptom management.    Diagnoses and all orders for this visit:    Essential hypertension  -     Comprehensive metabolic panel; Future  -     TSH; Future  -     Lipid panel; Future    CKD (chronic kidney disease) stage 3, GFR 30-59 ml/min  -     Comprehensive metabolic panel; Future  -     Lipid panel; Future    Controlled type 2 diabetes mellitus without complication, without long-term current use of insulin  -     Comprehensive metabolic panel; Future  -     Hemoglobin A1c; Future  -     Lipid panel; Future    Scoliosis of lumbar spine, unspecified scoliosis type  -     continue muscle relaxer  -     recommend physical therapy    Other orders  -     baclofen (LIORESAL) 10 MG tablet; Take 1 tablet (10 mg total) by mouth 3 (three) times daily.  -     traZODone (DESYREL) 150 MG tablet; Take 1 tablet (150 mg total) by mouth nightly.  -     pantoprazole (PROTONIX) 40 MG tablet; Take 1 tablet (40 mg total) by mouth once daily.  -     lovastatin (MEVACOR) 40 MG tablet; Take 1 tablet (40 mg total) by mouth nightly.  -     irbesartan (AVAPRO) 150 MG tablet; TAKE 1 TABLET(150 MG) BY MOUTH EVERY EVENING  -     hydroCHLOROthiazide  (HYDRODIURIL) 25 MG tablet; Take 1 tablet (25 mg total) by mouth once daily.  -     FLUoxetine 40 MG capsule; Take 1 capsule (40 mg total) by mouth once daily.  -     diazePAM (VALIUM) 5 MG tablet; Take 1 tablet (5 mg total) by mouth nightly.

## 2019-10-24 ENCOUNTER — OFFICE VISIT (OUTPATIENT)
Dept: INTERNAL MEDICINE | Facility: CLINIC | Age: 74
End: 2019-10-24
Payer: MEDICARE

## 2019-10-24 VITALS
HEART RATE: 76 BPM | SYSTOLIC BLOOD PRESSURE: 124 MMHG | WEIGHT: 196.88 LBS | DIASTOLIC BLOOD PRESSURE: 54 MMHG | HEIGHT: 64 IN | TEMPERATURE: 98 F | RESPIRATION RATE: 19 BRPM | BODY MASS INDEX: 33.61 KG/M2

## 2019-10-24 DIAGNOSIS — N18.30 CKD (CHRONIC KIDNEY DISEASE) STAGE 3, GFR 30-59 ML/MIN: ICD-10-CM

## 2019-10-24 DIAGNOSIS — E11.9 CONTROLLED TYPE 2 DIABETES MELLITUS WITHOUT COMPLICATION, WITHOUT LONG-TERM CURRENT USE OF INSULIN: ICD-10-CM

## 2019-10-24 DIAGNOSIS — M41.9 SCOLIOSIS OF LUMBAR SPINE, UNSPECIFIED SCOLIOSIS TYPE: ICD-10-CM

## 2019-10-24 DIAGNOSIS — I10 ESSENTIAL HYPERTENSION: Primary | ICD-10-CM

## 2019-10-24 PROCEDURE — 99999 PR PBB SHADOW E&M-EST. PATIENT-LVL III: CPT | Mod: PBBFAC,,, | Performed by: INTERNAL MEDICINE

## 2019-10-24 PROCEDURE — 99999 PR PBB SHADOW E&M-EST. PATIENT-LVL III: ICD-10-PCS | Mod: PBBFAC,,, | Performed by: INTERNAL MEDICINE

## 2019-10-24 PROCEDURE — 99214 PR OFFICE/OUTPT VISIT, EST, LEVL IV, 30-39 MIN: ICD-10-PCS | Mod: S$PBB,,, | Performed by: INTERNAL MEDICINE

## 2019-10-24 PROCEDURE — 99214 OFFICE O/P EST MOD 30 MIN: CPT | Mod: S$PBB,,, | Performed by: INTERNAL MEDICINE

## 2019-10-24 PROCEDURE — 99213 OFFICE O/P EST LOW 20 MIN: CPT | Mod: PBBFAC,PO | Performed by: INTERNAL MEDICINE

## 2019-10-24 RX ORDER — HYDROCHLOROTHIAZIDE 25 MG/1
25 TABLET ORAL DAILY
Qty: 90 TABLET | Refills: 3 | Status: SHIPPED | OUTPATIENT
Start: 2019-10-24 | End: 2020-02-14 | Stop reason: SDUPTHER

## 2019-10-24 RX ORDER — DIAZEPAM 5 MG/1
5 TABLET ORAL NIGHTLY
Qty: 30 TABLET | Refills: 2 | Status: SHIPPED | OUTPATIENT
Start: 2019-10-24 | End: 2020-02-14 | Stop reason: SDUPTHER

## 2019-10-24 RX ORDER — BACLOFEN 10 MG/1
10 TABLET ORAL 3 TIMES DAILY
Qty: 90 TABLET | Refills: 3 | Status: SHIPPED | OUTPATIENT
Start: 2019-10-24 | End: 2020-02-14 | Stop reason: SDUPTHER

## 2019-10-24 RX ORDER — LOVASTATIN 40 MG/1
40 TABLET ORAL NIGHTLY
Qty: 90 TABLET | Refills: 3 | Status: SHIPPED | OUTPATIENT
Start: 2019-10-24 | End: 2020-02-14 | Stop reason: SDUPTHER

## 2019-10-24 RX ORDER — PANTOPRAZOLE SODIUM 40 MG/1
40 TABLET, DELAYED RELEASE ORAL DAILY
Qty: 90 TABLET | Refills: 3 | Status: SHIPPED | OUTPATIENT
Start: 2019-10-24 | End: 2020-02-14

## 2019-10-24 RX ORDER — IRBESARTAN 150 MG/1
TABLET ORAL
Qty: 90 TABLET | Refills: 3 | Status: SHIPPED | OUTPATIENT
Start: 2019-10-24 | End: 2020-01-31

## 2019-10-24 RX ORDER — TRAZODONE HYDROCHLORIDE 150 MG/1
150 TABLET ORAL NIGHTLY
Qty: 90 TABLET | Refills: 3 | Status: SHIPPED | OUTPATIENT
Start: 2019-10-24 | End: 2020-02-14 | Stop reason: SDUPTHER

## 2019-10-24 RX ORDER — FLUOXETINE HYDROCHLORIDE 40 MG/1
40 CAPSULE ORAL DAILY
Qty: 90 CAPSULE | Refills: 3 | Status: SHIPPED | OUTPATIENT
Start: 2019-10-24 | End: 2020-02-14 | Stop reason: SDUPTHER

## 2019-10-28 ENCOUNTER — LAB VISIT (OUTPATIENT)
Dept: LAB | Facility: HOSPITAL | Age: 74
End: 2019-10-28
Attending: INTERNAL MEDICINE
Payer: MEDICARE

## 2019-10-28 DIAGNOSIS — N18.30 CKD (CHRONIC KIDNEY DISEASE) STAGE 3, GFR 30-59 ML/MIN: ICD-10-CM

## 2019-10-28 DIAGNOSIS — I10 ESSENTIAL HYPERTENSION: ICD-10-CM

## 2019-10-28 DIAGNOSIS — E11.9 CONTROLLED TYPE 2 DIABETES MELLITUS WITHOUT COMPLICATION, WITHOUT LONG-TERM CURRENT USE OF INSULIN: ICD-10-CM

## 2019-10-28 LAB
ALBUMIN SERPL BCP-MCNC: 3.4 G/DL (ref 3.5–5.2)
ALP SERPL-CCNC: 105 U/L (ref 55–135)
ALT SERPL W/O P-5'-P-CCNC: 35 U/L (ref 10–44)
ANION GAP SERPL CALC-SCNC: 10 MMOL/L (ref 8–16)
AST SERPL-CCNC: 47 U/L (ref 10–40)
BILIRUB SERPL-MCNC: 0.3 MG/DL (ref 0.1–1)
BUN SERPL-MCNC: 14 MG/DL (ref 8–23)
CALCIUM SERPL-MCNC: 10.1 MG/DL (ref 8.7–10.5)
CHLORIDE SERPL-SCNC: 103 MMOL/L (ref 95–110)
CHOLEST SERPL-MCNC: 148 MG/DL (ref 120–199)
CHOLEST/HDLC SERPL: 3.1 {RATIO} (ref 2–5)
CO2 SERPL-SCNC: 31 MMOL/L (ref 23–29)
CREAT SERPL-MCNC: 1.5 MG/DL (ref 0.5–1.4)
EST. GFR  (AFRICAN AMERICAN): 39.3 ML/MIN/1.73 M^2
EST. GFR  (NON AFRICAN AMERICAN): 34.1 ML/MIN/1.73 M^2
ESTIMATED AVG GLUCOSE: 177 MG/DL (ref 68–131)
GLUCOSE SERPL-MCNC: 232 MG/DL (ref 70–110)
HBA1C MFR BLD HPLC: 7.8 % (ref 4–5.6)
HDLC SERPL-MCNC: 48 MG/DL (ref 40–75)
HDLC SERPL: 32.4 % (ref 20–50)
LDLC SERPL CALC-MCNC: 70.4 MG/DL (ref 63–159)
NONHDLC SERPL-MCNC: 100 MG/DL
POTASSIUM SERPL-SCNC: 3.6 MMOL/L (ref 3.5–5.1)
PROT SERPL-MCNC: 8 G/DL (ref 6–8.4)
SODIUM SERPL-SCNC: 144 MMOL/L (ref 136–145)
TRIGL SERPL-MCNC: 148 MG/DL (ref 30–150)
TSH SERPL DL<=0.005 MIU/L-ACNC: 1.96 UIU/ML (ref 0.4–4)

## 2019-10-28 PROCEDURE — 80053 COMPREHEN METABOLIC PANEL: CPT

## 2019-10-28 PROCEDURE — 83036 HEMOGLOBIN GLYCOSYLATED A1C: CPT

## 2019-10-28 PROCEDURE — 80061 LIPID PANEL: CPT

## 2019-10-28 PROCEDURE — 84443 ASSAY THYROID STIM HORMONE: CPT

## 2019-10-28 PROCEDURE — 36415 COLL VENOUS BLD VENIPUNCTURE: CPT | Mod: PN

## 2019-10-31 ENCOUNTER — EXTERNAL CHRONIC CARE MANAGEMENT (OUTPATIENT)
Dept: PRIMARY CARE CLINIC | Facility: CLINIC | Age: 74
End: 2019-10-31
Payer: MEDICARE

## 2019-10-31 PROCEDURE — 99490 PR CHRONIC CARE MGMT, 1ST 20 MIN: ICD-10-PCS | Mod: S$PBB,,, | Performed by: INTERNAL MEDICINE

## 2019-10-31 PROCEDURE — 99490 CHRNC CARE MGMT STAFF 1ST 20: CPT | Mod: S$PBB,,, | Performed by: INTERNAL MEDICINE

## 2019-10-31 PROCEDURE — 99490 CHRNC CARE MGMT STAFF 1ST 20: CPT | Mod: PBBFAC,PO | Performed by: INTERNAL MEDICINE

## 2019-11-04 RX ORDER — METFORMIN HYDROCHLORIDE 500 MG/1
TABLET ORAL
Qty: 60 TABLET | Refills: 0 | Status: SHIPPED | OUTPATIENT
Start: 2019-11-04 | End: 2019-12-02 | Stop reason: SDUPTHER

## 2019-11-30 ENCOUNTER — EXTERNAL CHRONIC CARE MANAGEMENT (OUTPATIENT)
Dept: PRIMARY CARE CLINIC | Facility: CLINIC | Age: 74
End: 2019-11-30
Payer: MEDICARE

## 2019-11-30 PROCEDURE — 99490 CHRNC CARE MGMT STAFF 1ST 20: CPT | Mod: PBBFAC,PO | Performed by: INTERNAL MEDICINE

## 2019-11-30 PROCEDURE — 99490 CHRNC CARE MGMT STAFF 1ST 20: CPT | Mod: S$PBB,,, | Performed by: INTERNAL MEDICINE

## 2019-11-30 PROCEDURE — 99490 PR CHRONIC CARE MGMT, 1ST 20 MIN: ICD-10-PCS | Mod: S$PBB,,, | Performed by: INTERNAL MEDICINE

## 2019-12-02 RX ORDER — METFORMIN HYDROCHLORIDE 500 MG/1
TABLET ORAL
Qty: 60 TABLET | Refills: 0 | Status: SHIPPED | OUTPATIENT
Start: 2019-12-02 | End: 2020-02-14

## 2019-12-10 ENCOUNTER — PATIENT OUTREACH (OUTPATIENT)
Dept: ADMINISTRATIVE | Facility: OTHER | Age: 74
End: 2019-12-10

## 2019-12-10 RX ORDER — TRAZODONE HYDROCHLORIDE 150 MG/1
TABLET ORAL
Qty: 90 TABLET | Refills: 3 | Status: SHIPPED | OUTPATIENT
Start: 2019-12-10 | End: 2019-12-12 | Stop reason: SDUPTHER

## 2019-12-10 RX ORDER — PANTOPRAZOLE SODIUM 40 MG/1
TABLET, DELAYED RELEASE ORAL
Qty: 90 TABLET | Refills: 3 | Status: SHIPPED | OUTPATIENT
Start: 2019-12-10 | End: 2020-02-14

## 2019-12-12 ENCOUNTER — OFFICE VISIT (OUTPATIENT)
Dept: SPINE | Facility: CLINIC | Age: 74
End: 2019-12-12
Payer: MEDICARE

## 2019-12-12 VITALS
WEIGHT: 196 LBS | HEART RATE: 77 BPM | BODY MASS INDEX: 33.46 KG/M2 | HEIGHT: 64 IN | DIASTOLIC BLOOD PRESSURE: 72 MMHG | SYSTOLIC BLOOD PRESSURE: 156 MMHG

## 2019-12-12 DIAGNOSIS — M54.16 LUMBAR RADICULOPATHY: ICD-10-CM

## 2019-12-12 DIAGNOSIS — M54.41 CHRONIC BILATERAL LOW BACK PAIN WITH BILATERAL SCIATICA: Primary | ICD-10-CM

## 2019-12-12 DIAGNOSIS — M54.42 CHRONIC BILATERAL LOW BACK PAIN WITH BILATERAL SCIATICA: Primary | ICD-10-CM

## 2019-12-12 DIAGNOSIS — M51.36 DDD (DEGENERATIVE DISC DISEASE), LUMBAR: ICD-10-CM

## 2019-12-12 DIAGNOSIS — M43.16 SPONDYLOLISTHESIS, LUMBAR REGION: ICD-10-CM

## 2019-12-12 DIAGNOSIS — M47.26 OTHER SPONDYLOSIS WITH RADICULOPATHY, LUMBAR REGION: ICD-10-CM

## 2019-12-12 DIAGNOSIS — G89.29 CHRONIC BILATERAL LOW BACK PAIN WITH BILATERAL SCIATICA: Primary | ICD-10-CM

## 2019-12-12 PROCEDURE — 99214 OFFICE O/P EST MOD 30 MIN: CPT | Mod: S$PBB,,, | Performed by: PHYSICIAN ASSISTANT

## 2019-12-12 PROCEDURE — 1159F PR MEDICATION LIST DOCUMENTED IN MEDICAL RECORD: ICD-10-PCS | Mod: ,,, | Performed by: PHYSICIAN ASSISTANT

## 2019-12-12 PROCEDURE — 99999 PR PBB SHADOW E&M-EST. PATIENT-LVL V: ICD-10-PCS | Mod: PBBFAC,,, | Performed by: PHYSICIAN ASSISTANT

## 2019-12-12 PROCEDURE — 1125F PR PAIN SEVERITY QUANTIFIED, PAIN PRESENT: ICD-10-PCS | Mod: ,,, | Performed by: PHYSICIAN ASSISTANT

## 2019-12-12 PROCEDURE — 99214 PR OFFICE/OUTPT VISIT, EST, LEVL IV, 30-39 MIN: ICD-10-PCS | Mod: S$PBB,,, | Performed by: PHYSICIAN ASSISTANT

## 2019-12-12 PROCEDURE — 99215 OFFICE O/P EST HI 40 MIN: CPT | Mod: PBBFAC | Performed by: PHYSICIAN ASSISTANT

## 2019-12-12 PROCEDURE — 1159F MED LIST DOCD IN RCRD: CPT | Mod: ,,, | Performed by: PHYSICIAN ASSISTANT

## 2019-12-12 PROCEDURE — 1125F AMNT PAIN NOTED PAIN PRSNT: CPT | Mod: ,,, | Performed by: PHYSICIAN ASSISTANT

## 2019-12-12 PROCEDURE — 99999 PR PBB SHADOW E&M-EST. PATIENT-LVL V: CPT | Mod: PBBFAC,,, | Performed by: PHYSICIAN ASSISTANT

## 2019-12-12 RX ORDER — DICLOFENAC SODIUM 75 MG/1
75 TABLET, DELAYED RELEASE ORAL 2 TIMES DAILY PRN
Qty: 180 TABLET | Refills: 0 | Status: SHIPPED | OUTPATIENT
Start: 2019-12-12 | End: 2020-01-31

## 2019-12-12 RX ORDER — GABAPENTIN 300 MG/1
CAPSULE ORAL
Qty: 270 CAPSULE | Refills: 0 | Status: SHIPPED | OUTPATIENT
Start: 2019-12-12 | End: 2020-07-06

## 2019-12-12 NOTE — PATIENT INSTRUCTIONS
Stop meloxicam    Start diclofenac twice a day with food    Take gabapentin one in the morning and two in the evening

## 2019-12-12 NOTE — PROGRESS NOTES
"Subjective:     Patient ID:  Val Bragg is a 74 y.o. female.    [facility]    Chief Complaint: Low back pain and bilateral leg pain    HPI    Val Bragg is a 74 y.o. female who presents for follow up.  She did not go to PT.  She did not take the diclofenac.  She is taking mobic BID and gabapentin BID and she is not sure if these are helping.    Back pain is worse when standing and better with sitting.  She has bilateral anterior thigh pain to the knees mainly when walking.    The back pain is worse than the leg pain.    The right and left leg pain is equal.    Patient denies any recent accidents or trauma, no saddle anesthesias, and no bowel or bladder incontinence.      Review of Systems:  Constitution: Negative for chills, fever, night sweats and weight loss.   Musculoskeletal: Negative for falls.   Gastrointestinal: Negative for bowel incontinence, nausea and vomiting.   Genitourinary: Negative for bladder incontinence.   Neurological: Negative for disturbances in coordination and loss of balance.      Objective:      Vitals:    12/12/19 1250   BP: (!) 156/72   Pulse: 77   Weight: 88.9 kg (196 lb)   Height: 5' 4" (1.626 m)   PainSc:   8   PainLoc: Back         Physical Exam:    General:  Val Bragg is well-developed, well-nourished, appears stated age, in no acute distress, alert and oriented to person, place, and time.    Patient sits comfortably in the exam room and answers questions appropriately. Grossly patient is able to move bilateral lower extremities without difficulty.     XRAY Interpretation:      Lumbar spine ap/lateral/flexion/extension xrays were personally reviewed today.  No fractures.  No movement on flexion and extension. Multilevel DDD and spondylosis.  Grade one spondylolisthesis L4-5.    Assessment:          1. Chronic bilateral low back pain with bilateral sciatica    2. DDD (degenerative disc disease), lumbar    3. Other spondylosis with radiculopathy, lumbar region    4. Lumbar " radiculopathy    5. Spondylolisthesis, lumbar region            Plan:          Orders Placed This Encounter    MRI Lumbar Spine Without Contrast    Ambulatory Referral to Physical/Occupational Therapy    diclofenac (VOLTAREN) 75 MG EC tablet    gabapentin (NEURONTIN) 300 MG capsule       Grade one spondylolisthesis L4-5.  Multilevel DDD and spondylosis     -Dc mobic   -Diclofenac PRN with food  -Neurontin increase to one in am and two in pm  -PT through [facility]  -MRI lumbar spine and fu after to assess for injections    Follow-Up:  Follow up in about 2 weeks (around 12/26/2019). If there are any questions prior to this, the patient was instructed to contact the office.       Sharita Ashford [facility], PA-C  Neurosurgery  [facility]  [facility]

## 2019-12-19 ENCOUNTER — HOSPITAL ENCOUNTER (OUTPATIENT)
Dept: RADIOLOGY | Facility: HOSPITAL | Age: 74
Discharge: HOME | End: 2019-12-19
Attending: PHYSICIAN ASSISTANT
Payer: MEDICARE

## 2019-12-19 DIAGNOSIS — M51.36 DDD (DEGENERATIVE DISC DISEASE), LUMBAR: ICD-10-CM

## 2019-12-19 DIAGNOSIS — G89.29 CHRONIC BILATERAL LOW BACK PAIN WITH BILATERAL SCIATICA: ICD-10-CM

## 2019-12-19 DIAGNOSIS — M54.42 CHRONIC BILATERAL LOW BACK PAIN WITH BILATERAL SCIATICA: ICD-10-CM

## 2019-12-19 DIAGNOSIS — M54.16 LUMBAR RADICULOPATHY: ICD-10-CM

## 2019-12-19 DIAGNOSIS — M43.16 SPONDYLOLISTHESIS, LUMBAR REGION: ICD-10-CM

## 2019-12-19 DIAGNOSIS — M54.41 CHRONIC BILATERAL LOW BACK PAIN WITH BILATERAL SCIATICA: ICD-10-CM

## 2019-12-19 DIAGNOSIS — M47.26 OTHER SPONDYLOSIS WITH RADICULOPATHY, LUMBAR REGION: ICD-10-CM

## 2019-12-19 PROCEDURE — 72148 MRI LUMBAR SPINE WITHOUT CONTRAST: ICD-10-PCS | Mod: 26,,, | Performed by: RADIOLOGY

## 2019-12-19 PROCEDURE — 72148 MRI LUMBAR SPINE W/O DYE: CPT | Mod: TC

## 2019-12-19 PROCEDURE — 72148 MRI LUMBAR SPINE W/O DYE: CPT | Mod: 26,,, | Performed by: RADIOLOGY

## 2019-12-31 ENCOUNTER — EXTERNAL CHRONIC CARE MANAGEMENT (OUTPATIENT)
Dept: PRIMARY CARE CLINIC | Facility: CLINIC | Age: 74
End: 2019-12-31
Payer: MEDICARE

## 2019-12-31 ENCOUNTER — PATIENT OUTREACH (OUTPATIENT)
Dept: ADMINISTRATIVE | Facility: OTHER | Age: 74
End: 2019-12-31

## 2019-12-31 PROCEDURE — 99490 CHRNC CARE MGMT STAFF 1ST 20: CPT | Mod: PBBFAC,PO | Performed by: INTERNAL MEDICINE

## 2019-12-31 PROCEDURE — 99490 PR CHRONIC CARE MGMT, 1ST 20 MIN: ICD-10-PCS | Mod: S$PBB,,, | Performed by: INTERNAL MEDICINE

## 2019-12-31 PROCEDURE — 99490 CHRNC CARE MGMT STAFF 1ST 20: CPT | Mod: S$PBB,,, | Performed by: INTERNAL MEDICINE

## 2020-01-03 ENCOUNTER — OFFICE VISIT (OUTPATIENT)
Dept: SPINE | Facility: CLINIC | Age: 75
End: 2020-01-03
Payer: MEDICARE

## 2020-01-03 VITALS
DIASTOLIC BLOOD PRESSURE: 72 MMHG | BODY MASS INDEX: 33.46 KG/M2 | HEART RATE: 81 BPM | HEIGHT: 64 IN | SYSTOLIC BLOOD PRESSURE: 148 MMHG | WEIGHT: 196 LBS

## 2020-01-03 DIAGNOSIS — M54.16 LUMBAR RADICULOPATHY: ICD-10-CM

## 2020-01-03 DIAGNOSIS — M54.41 CHRONIC BILATERAL LOW BACK PAIN WITH BILATERAL SCIATICA: Primary | ICD-10-CM

## 2020-01-03 DIAGNOSIS — G89.29 CHRONIC BILATERAL LOW BACK PAIN WITH BILATERAL SCIATICA: Primary | ICD-10-CM

## 2020-01-03 DIAGNOSIS — M47.26 OTHER SPONDYLOSIS WITH RADICULOPATHY, LUMBAR REGION: ICD-10-CM

## 2020-01-03 DIAGNOSIS — M43.16 SPONDYLOLISTHESIS, LUMBAR REGION: ICD-10-CM

## 2020-01-03 DIAGNOSIS — M51.36 DDD (DEGENERATIVE DISC DISEASE), LUMBAR: ICD-10-CM

## 2020-01-03 DIAGNOSIS — M54.42 CHRONIC BILATERAL LOW BACK PAIN WITH BILATERAL SCIATICA: Primary | ICD-10-CM

## 2020-01-03 PROCEDURE — 99214 OFFICE O/P EST MOD 30 MIN: CPT | Mod: S$PBB,,, | Performed by: PHYSICIAN ASSISTANT

## 2020-01-03 PROCEDURE — 1159F MED LIST DOCD IN RCRD: CPT | Mod: ,,, | Performed by: PHYSICIAN ASSISTANT

## 2020-01-03 PROCEDURE — 99999 PR PBB SHADOW E&M-EST. PATIENT-LVL IV: ICD-10-PCS | Mod: PBBFAC,,, | Performed by: PHYSICIAN ASSISTANT

## 2020-01-03 PROCEDURE — 1125F PR PAIN SEVERITY QUANTIFIED, PAIN PRESENT: ICD-10-PCS | Mod: ,,, | Performed by: PHYSICIAN ASSISTANT

## 2020-01-03 PROCEDURE — 1159F PR MEDICATION LIST DOCUMENTED IN MEDICAL RECORD: ICD-10-PCS | Mod: ,,, | Performed by: PHYSICIAN ASSISTANT

## 2020-01-03 PROCEDURE — 99214 OFFICE O/P EST MOD 30 MIN: CPT | Mod: PBBFAC | Performed by: PHYSICIAN ASSISTANT

## 2020-01-03 PROCEDURE — 99999 PR PBB SHADOW E&M-EST. PATIENT-LVL IV: CPT | Mod: PBBFAC,,, | Performed by: PHYSICIAN ASSISTANT

## 2020-01-03 PROCEDURE — 1125F AMNT PAIN NOTED PAIN PRSNT: CPT | Mod: ,,, | Performed by: PHYSICIAN ASSISTANT

## 2020-01-03 PROCEDURE — 99214 PR OFFICE/OUTPT VISIT, EST, LEVL IV, 30-39 MIN: ICD-10-PCS | Mod: S$PBB,,, | Performed by: PHYSICIAN ASSISTANT

## 2020-01-03 NOTE — PROGRESS NOTES
"Subjective:     Patient ID:  Val Bragg is a 74 y.o. female.    [facility]    Chief Complaint: Back and bilateral leg pain    HPI    Val Bragg is a 74 y.o. female who presents for MRI follow up.    Diclofenac is helping some.  She is taking Neurontin 1-2 tabs in the am and one at night.    She has not started PT yet.     Back pain is worse when standing and better with sitting.  She has bilateral anterior thigh pain to the knees mainly when walking.     The back pain is worse than the leg pain.     The right and left leg pain is equal.     Patient denies any recent accidents or trauma, no saddle anesthesias, and no bowel or bladder incontinence.      Review of Systems:  Constitution: Negative for chills, fever, night sweats and weight loss.   Musculoskeletal: Negative for falls.   Gastrointestinal: Negative for bowel incontinence, nausea and vomiting.   Genitourinary: Negative for bladder incontinence.   Neurological: Negative for disturbances in coordination and loss of balance.      Objective:      Vitals:    01/03/20 1302   BP: (!) 148/72   Pulse: 81   Weight: 88.9 kg (196 lb)   Height: 5' 4" (1.626 m)   PainSc:   9   PainLoc: Back         Physical Exam:    General:  Val Bragg is well-developed, well-nourished, appears stated age, in no acute distress, alert and oriented to person, place, and time.    Patient sits comfortably in the exam room and answers questions appropriately. Grossly patient is able to move bilateral lower extremities without difficulty.     XRAY Interpretation:      Lumbar spine ap/lateral/flexion/extension xrays were personally reviewed today.  No fractures.  No movement on flexion and extension. Multilevel DDD and spondylosis.  Grade one spondylolisthesis L4-5.    MRI Interpretation:     Lumbar spine MRI was personally reviewed today.  Grade one spondylolisthesis L4-5 with bilateral facet degeneration and edema.    Assessment:          1. Chronic bilateral low back pain with " bilateral sciatica    2. DDD (degenerative disc disease), lumbar    3. Other spondylosis with radiculopathy, lumbar region    4. Lumbar radiculopathy    5. Spondylolisthesis, lumbar region            Plan:          Orders Placed This Encounter    Procedure Order to [facility]     Grade one spondylolisthesis L4-5 with bilateral facet degeneration and edema.    -Continue Neurontin and diclofenac  -Bilateral L4-5 facet injections with pain management  -She may start PT but she may hold off  -Fu in two months      Follow-Up:  Follow up in about 2 months (around 3/3/2020). If there are any questions prior to this, the patient was instructed to contact the office.       Sharita [facility], PA-C  Neurosurgery  [facility]  [facility]

## 2020-01-07 ENCOUNTER — TELEPHONE (OUTPATIENT)
Dept: PAIN MEDICINE | Facility: CLINIC | Age: 75
End: 2020-01-07

## 2020-01-08 ENCOUNTER — TELEPHONE (OUTPATIENT)
Dept: PAIN MEDICINE | Facility: CLINIC | Age: 75
End: 2020-01-08

## 2020-01-27 ENCOUNTER — TELEPHONE (OUTPATIENT)
Dept: INTERNAL MEDICINE | Facility: CLINIC | Age: 75
End: 2020-01-27

## 2020-01-27 DIAGNOSIS — I10 ESSENTIAL HYPERTENSION: Primary | ICD-10-CM

## 2020-01-27 NOTE — TELEPHONE ENCOUNTER
----- Message from Rhonda Franco sent at 1/27/2020  2:27 PM CST -----  Contact: [phone]  Patient is calling to request lab work to check her sugar levels before upcoming appt on Jan 31.  Please advise, thanks

## 2020-01-28 ENCOUNTER — LAB VISIT (OUTPATIENT)
Dept: LAB | Facility: HOSPITAL | Age: 75
End: 2020-01-28
Attending: INTERNAL MEDICINE
Payer: MEDICARE

## 2020-01-28 DIAGNOSIS — E11.9 TYPE 2 DIABETES MELLITUS WITHOUT COMPLICATION: ICD-10-CM

## 2020-01-28 DIAGNOSIS — I10 ESSENTIAL HYPERTENSION: ICD-10-CM

## 2020-01-28 LAB
ANION GAP SERPL CALC-SCNC: 11 MMOL/L (ref 8–16)
BUN SERPL-MCNC: 13 MG/DL (ref 8–23)
CALCIUM SERPL-MCNC: 9.4 MG/DL (ref 8.7–10.5)
CHLORIDE SERPL-SCNC: 100 MMOL/L (ref 95–110)
CO2 SERPL-SCNC: 32 MMOL/L (ref 23–29)
CREAT SERPL-MCNC: 1.7 MG/DL (ref 0.5–1.4)
EST. GFR  (AFRICAN AMERICAN): 33.5 ML/MIN/1.73 M^2
EST. GFR  (NON AFRICAN AMERICAN): 29.1 ML/MIN/1.73 M^2
ESTIMATED AVG GLUCOSE: 189 MG/DL (ref 68–131)
GLUCOSE SERPL-MCNC: 244 MG/DL (ref 70–110)
HBA1C MFR BLD HPLC: 8.2 % (ref 4–5.6)
POTASSIUM SERPL-SCNC: 3.7 MMOL/L (ref 3.5–5.1)
SODIUM SERPL-SCNC: 143 MMOL/L (ref 136–145)

## 2020-01-28 PROCEDURE — 36415 COLL VENOUS BLD VENIPUNCTURE: CPT | Mod: PN

## 2020-01-28 PROCEDURE — 80048 BASIC METABOLIC PNL TOTAL CA: CPT

## 2020-01-28 PROCEDURE — 83036 HEMOGLOBIN GLYCOSYLATED A1C: CPT

## 2020-01-31 ENCOUNTER — OFFICE VISIT (OUTPATIENT)
Dept: INTERNAL MEDICINE | Facility: CLINIC | Age: 75
End: 2020-01-31
Payer: MEDICARE

## 2020-01-31 ENCOUNTER — EXTERNAL CHRONIC CARE MANAGEMENT (OUTPATIENT)
Dept: PRIMARY CARE CLINIC | Facility: CLINIC | Age: 75
End: 2020-01-31
Payer: MEDICARE

## 2020-01-31 VITALS
WEIGHT: 203.94 LBS | HEIGHT: 64 IN | BODY MASS INDEX: 34.82 KG/M2 | DIASTOLIC BLOOD PRESSURE: 58 MMHG | RESPIRATION RATE: 18 BRPM | TEMPERATURE: 98 F | HEART RATE: 84 BPM | SYSTOLIC BLOOD PRESSURE: 112 MMHG

## 2020-01-31 DIAGNOSIS — N18.30 CKD (CHRONIC KIDNEY DISEASE) STAGE 3, GFR 30-59 ML/MIN: ICD-10-CM

## 2020-01-31 DIAGNOSIS — I10 ESSENTIAL HYPERTENSION: Primary | ICD-10-CM

## 2020-01-31 DIAGNOSIS — E11.9 DM TYPE 2 WITHOUT RETINOPATHY: ICD-10-CM

## 2020-01-31 DIAGNOSIS — R60.9 EDEMA, UNSPECIFIED TYPE: ICD-10-CM

## 2020-01-31 DIAGNOSIS — N18.9 CHRONIC KIDNEY DISEASE, UNSPECIFIED CKD STAGE: ICD-10-CM

## 2020-01-31 PROCEDURE — 99214 PR OFFICE/OUTPT VISIT, EST, LEVL IV, 30-39 MIN: ICD-10-PCS | Mod: S$PBB,,, | Performed by: INTERNAL MEDICINE

## 2020-01-31 PROCEDURE — 99999 PR PBB SHADOW E&M-EST. PATIENT-LVL III: CPT | Mod: PBBFAC,,, | Performed by: INTERNAL MEDICINE

## 2020-01-31 PROCEDURE — 99490 PR CHRONIC CARE MGMT, 1ST 20 MIN: ICD-10-PCS | Mod: S$PBB,,, | Performed by: INTERNAL MEDICINE

## 2020-01-31 PROCEDURE — 99490 CHRNC CARE MGMT STAFF 1ST 20: CPT | Mod: S$PBB,,, | Performed by: INTERNAL MEDICINE

## 2020-01-31 PROCEDURE — 99490 CHRNC CARE MGMT STAFF 1ST 20: CPT | Mod: PBBFAC,PO | Performed by: INTERNAL MEDICINE

## 2020-01-31 PROCEDURE — 99999 PR PBB SHADOW E&M-EST. PATIENT-LVL III: ICD-10-PCS | Mod: PBBFAC,,, | Performed by: INTERNAL MEDICINE

## 2020-01-31 PROCEDURE — 99214 OFFICE O/P EST MOD 30 MIN: CPT | Mod: S$PBB,,, | Performed by: INTERNAL MEDICINE

## 2020-01-31 PROCEDURE — 99213 OFFICE O/P EST LOW 20 MIN: CPT | Mod: PBBFAC,PO,25 | Performed by: INTERNAL MEDICINE

## 2020-01-31 RX ORDER — LISINOPRIL 40 MG/1
40 TABLET ORAL DAILY
Qty: 30 TABLET | Refills: 3 | Status: SHIPPED | OUTPATIENT
Start: 2020-01-31 | End: 2020-02-14 | Stop reason: SDUPTHER

## 2020-01-31 RX ORDER — FUROSEMIDE 40 MG/1
40 TABLET ORAL DAILY
Qty: 5 TABLET | Refills: 0 | Status: SHIPPED | OUTPATIENT
Start: 2020-01-31 | End: 2020-02-14

## 2020-01-31 RX ORDER — HYDROCORTISONE 25 MG/G
CREAM TOPICAL 2 TIMES DAILY
Qty: 20 G | Refills: 1 | Status: SHIPPED | OUTPATIENT
Start: 2020-01-31 | End: 2022-02-18 | Stop reason: SDUPTHER

## 2020-01-31 NOTE — PROGRESS NOTES
CC: followup of hypertension and diabetes  HPI:  The patient is a 74 y.o. year old female who presents to the office for followup of hypertension and diabetes.  The patient denies any chest pain, shortness of breath, headache, blurred vision, excessive fatigue, nausea or vomiting.  Review of labs reveals worsening kidney function and elevated HbA1c.  She also reports continued back pain and chest congestion.  She complains of leg swelling, which improves with elevation.  She states she has not bween feeling well since her blood pressure medication was changed.  Unfortunately, she is unable to name her medications.  She reports rectal bleeding from hemorrhoids.    PAST MEDICAL HISTORY:  Past Medical History:   Diagnosis Date    Alternating exotropia 4/18/2013    Bilateral cataracts     Depression     Diabetes mellitus     GERD (gastroesophageal reflux disease)     Hyperlipidemia     Hypertension     Menopausal state     Multiple gastric polyps     on EGD in 2004    Sickle cell trait        SURGICAL HISTORY:  Past Surgical History:   Procedure Laterality Date    CATARACT EXTRACTION W/  INTRAOCULAR LENS IMPLANT Left 06/05/2018    Dr. Castillo    CHOLECYSTECTOMY      laproscopic cholecystectomy    COLONOSCOPY N/A 8/13/2019    Procedure: COLONOSCOPY;  Surgeon: Jeffrey Maynard MD;  [facility]  Service: Endoscopy;  Laterality: N/A;    ESOPHAGOGASTRODUODENOSCOPY N/A 8/13/2019    Procedure: ESOPHAGOGASTRODUODENOSCOPY (EGD);  Surgeon: Jeffrey Maynard MD;  [facility]  Service: Endoscopy;  Laterality: N/A;    HYSTERECTOMY      DENISHA/BSO - fibroids    INTRAOCULAR PROSTHESES INSERTION Left 6/5/2018    Procedure: INSERTION-INTRAOCULAR LENS (IOL);  Surgeon: Ga Castillo MD;  Location: [address];  Service: Ophthalmology;  Laterality: Left;    Laparoscopic right hemicolectomy      OOPHORECTOMY      PHACOEMULSIFICATION OF CATARACT Left 6/5/2018    Procedure:  PHACOEMULSIFICATION-ASPIRATION-CATARACT;  Surgeon: Ga Castillo MD;  Location: [facility] OR [address] [place] [address];  Service: Ophthalmology;  Laterality: Left;       MEDS:  Medcard reviewed and updated    ALLERGIES: Allergy Card reviewed and updated    SOCIAL HISTORY:   The patient is a nonsmoker.    PE:   APPEARANCE: Well nourished, well developed, in no acute distress.    CHEST: Lungs clear to auscultation with unlabored respirations.  CARDIOVASCULAR: Normal S1, S2. No murmurs. No carotid bruits. Positive pedal edema.  ABDOMEN: Bowel sounds normal. Not distended. Soft. No tenderness or masses.  PSYCHIATRIC: The patient is oriented to person, place, and time and has a pleasant affect.        ASSESSMENT/PLAN:  Val was seen today for follow-up, back pain, depression and anxiety.    Diagnoses and all orders for this visit:    Essential hypertension  -     Blood pressure is controlled    DM type 2 without retinopathy  -     Basic metabolic panel; Future  -     Iron and TIBC; Future    CKD (chronic kidney disease) stage 3, GFR 30-59 ml/min  -     CBC auto differential; Future  -     Basic metabolic panel; Future  -     Ferritin; Future  -     Iron and TIBC; Future    Chronic kidney disease, unspecified CKD stage   -     Ferritin; Future    Edema, unspecified type  -     Hold HCTZ and take furosemide    Other orders  -     lisinopril (PRINIVIL,ZESTRIL) 40 MG tablet; Take 1 tablet (40 mg total) by mouth once daily.  -     furosemide (LASIX) 40 MG tablet; Take 1 tablet (40 mg total) by mouth once daily. for 5 days  -     hydrocortisone 2.5 % cream; Apply topically 2 (two) times daily.

## 2020-01-31 NOTE — PATIENT INSTRUCTIONS
Discontinue voltaren due to changes in kidney function.  Restart lisinopril and discontinue avapro  Take furosemide daily for 5 days, and hold hydrochlorothiazide for the 5 days that you are taking furosemide.

## 2020-02-11 ENCOUNTER — LAB VISIT (OUTPATIENT)
Dept: LAB | Facility: HOSPITAL | Age: 75
End: 2020-02-11
Attending: INTERNAL MEDICINE
Payer: MEDICARE

## 2020-02-11 DIAGNOSIS — N18.9 CHRONIC KIDNEY DISEASE, UNSPECIFIED CKD STAGE: ICD-10-CM

## 2020-02-11 DIAGNOSIS — N18.30 CKD (CHRONIC KIDNEY DISEASE) STAGE 3, GFR 30-59 ML/MIN: ICD-10-CM

## 2020-02-11 DIAGNOSIS — E11.9 DM TYPE 2 WITHOUT RETINOPATHY: ICD-10-CM

## 2020-02-11 LAB
ANION GAP SERPL CALC-SCNC: 12 MMOL/L (ref 8–16)
BASOPHILS # BLD AUTO: 0.03 K/UL (ref 0–0.2)
BASOPHILS NFR BLD: 0.4 % (ref 0–1.9)
BUN SERPL-MCNC: 18 MG/DL (ref 8–23)
CALCIUM SERPL-MCNC: 10.2 MG/DL (ref 8.7–10.5)
CHLORIDE SERPL-SCNC: 101 MMOL/L (ref 95–110)
CO2 SERPL-SCNC: 29 MMOL/L (ref 23–29)
CREAT SERPL-MCNC: 1.6 MG/DL (ref 0.5–1.4)
DIFFERENTIAL METHOD: ABNORMAL
EOSINOPHIL # BLD AUTO: 0.3 K/UL (ref 0–0.5)
EOSINOPHIL NFR BLD: 4 % (ref 0–8)
ERYTHROCYTE [DISTWIDTH] IN BLOOD BY AUTOMATED COUNT: 16 % (ref 11.5–14.5)
EST. GFR  (AFRICAN AMERICAN): 36.1 ML/MIN/1.73 M^2
EST. GFR  (NON AFRICAN AMERICAN): 31.3 ML/MIN/1.73 M^2
FERRITIN SERPL-MCNC: 13 NG/ML (ref 20–300)
GLUCOSE SERPL-MCNC: 216 MG/DL (ref 70–110)
HCT VFR BLD AUTO: 34.7 % (ref 37–48.5)
HGB BLD-MCNC: 10.8 G/DL (ref 12–16)
IMM GRANULOCYTES # BLD AUTO: 0.02 K/UL (ref 0–0.04)
IMM GRANULOCYTES NFR BLD AUTO: 0.2 % (ref 0–0.5)
IRON SERPL-MCNC: 39 UG/DL (ref 30–160)
LYMPHOCYTES # BLD AUTO: 2.1 K/UL (ref 1–4.8)
LYMPHOCYTES NFR BLD: 25.3 % (ref 18–48)
MCH RBC QN AUTO: 26.3 PG (ref 27–31)
MCHC RBC AUTO-ENTMCNC: 31.1 G/DL (ref 32–36)
MCV RBC AUTO: 84 FL (ref 82–98)
MONOCYTES # BLD AUTO: 0.6 K/UL (ref 0.3–1)
MONOCYTES NFR BLD: 6.8 % (ref 4–15)
NEUTROPHILS # BLD AUTO: 5.2 K/UL (ref 1.8–7.7)
NEUTROPHILS NFR BLD: 63.3 % (ref 38–73)
NRBC BLD-RTO: 0 /100 WBC
PLATELET # BLD AUTO: 351 K/UL (ref 150–350)
PMV BLD AUTO: 12.1 FL (ref 9.2–12.9)
POTASSIUM SERPL-SCNC: 3.4 MMOL/L (ref 3.5–5.1)
RBC # BLD AUTO: 4.11 M/UL (ref 4–5.4)
SATURATED IRON: 9 % (ref 20–50)
SODIUM SERPL-SCNC: 142 MMOL/L (ref 136–145)
TOTAL IRON BINDING CAPACITY: 435 UG/DL (ref 250–450)
TRANSFERRIN SERPL-MCNC: 294 MG/DL (ref 200–375)
WBC # BLD AUTO: 8.19 K/UL (ref 3.9–12.7)

## 2020-02-11 PROCEDURE — 85025 COMPLETE CBC W/AUTO DIFF WBC: CPT

## 2020-02-11 PROCEDURE — 83540 ASSAY OF IRON: CPT

## 2020-02-11 PROCEDURE — 80048 BASIC METABOLIC PNL TOTAL CA: CPT

## 2020-02-11 PROCEDURE — 36415 COLL VENOUS BLD VENIPUNCTURE: CPT | Mod: PN

## 2020-02-11 PROCEDURE — 82728 ASSAY OF FERRITIN: CPT

## 2020-02-14 ENCOUNTER — OFFICE VISIT (OUTPATIENT)
Dept: INTERNAL MEDICINE | Facility: CLINIC | Age: 75
End: 2020-02-14
Payer: MEDICARE

## 2020-02-14 VITALS
BODY MASS INDEX: 34.1 KG/M2 | RESPIRATION RATE: 18 BRPM | HEART RATE: 72 BPM | HEIGHT: 64 IN | WEIGHT: 199.75 LBS | TEMPERATURE: 98 F | SYSTOLIC BLOOD PRESSURE: 124 MMHG | DIASTOLIC BLOOD PRESSURE: 60 MMHG

## 2020-02-14 DIAGNOSIS — E11.9 DM TYPE 2 WITHOUT RETINOPATHY: ICD-10-CM

## 2020-02-14 DIAGNOSIS — D50.9 IRON DEFICIENCY ANEMIA, UNSPECIFIED IRON DEFICIENCY ANEMIA TYPE: ICD-10-CM

## 2020-02-14 DIAGNOSIS — N18.30 CKD (CHRONIC KIDNEY DISEASE) STAGE 3, GFR 30-59 ML/MIN: ICD-10-CM

## 2020-02-14 DIAGNOSIS — K21.9 GASTROESOPHAGEAL REFLUX DISEASE, ESOPHAGITIS PRESENCE NOT SPECIFIED: ICD-10-CM

## 2020-02-14 DIAGNOSIS — F32.A DEPRESSION, UNSPECIFIED DEPRESSION TYPE: ICD-10-CM

## 2020-02-14 DIAGNOSIS — G47.00 INSOMNIA, UNSPECIFIED TYPE: ICD-10-CM

## 2020-02-14 DIAGNOSIS — M41.9 SCOLIOSIS OF LUMBAR SPINE, UNSPECIFIED SCOLIOSIS TYPE: ICD-10-CM

## 2020-02-14 DIAGNOSIS — E78.5 HYPERLIPIDEMIA, UNSPECIFIED HYPERLIPIDEMIA TYPE: ICD-10-CM

## 2020-02-14 DIAGNOSIS — R05.9 COUGH: Primary | ICD-10-CM

## 2020-02-14 DIAGNOSIS — E87.6 HYPOKALEMIA: ICD-10-CM

## 2020-02-14 DIAGNOSIS — I10 ESSENTIAL HYPERTENSION: ICD-10-CM

## 2020-02-14 PROCEDURE — 99999 PR PBB SHADOW E&M-EST. PATIENT-LVL V: ICD-10-PCS | Mod: PBBFAC,,, | Performed by: INTERNAL MEDICINE

## 2020-02-14 PROCEDURE — 99215 OFFICE O/P EST HI 40 MIN: CPT | Mod: PBBFAC,PO | Performed by: INTERNAL MEDICINE

## 2020-02-14 PROCEDURE — 99214 OFFICE O/P EST MOD 30 MIN: CPT | Mod: S$PBB,,, | Performed by: INTERNAL MEDICINE

## 2020-02-14 PROCEDURE — 99999 PR PBB SHADOW E&M-EST. PATIENT-LVL V: CPT | Mod: PBBFAC,,, | Performed by: INTERNAL MEDICINE

## 2020-02-14 PROCEDURE — 99214 PR OFFICE/OUTPT VISIT, EST, LEVL IV, 30-39 MIN: ICD-10-PCS | Mod: S$PBB,,, | Performed by: INTERNAL MEDICINE

## 2020-02-14 RX ORDER — DIAZEPAM 5 MG/1
5 TABLET ORAL NIGHTLY
Qty: 30 TABLET | Refills: 2 | Status: SHIPPED | OUTPATIENT
Start: 2020-02-14 | End: 2020-02-28

## 2020-02-14 RX ORDER — LOVASTATIN 40 MG/1
40 TABLET ORAL NIGHTLY
Qty: 90 TABLET | Refills: 3 | Status: SHIPPED | OUTPATIENT
Start: 2020-02-14 | End: 2021-02-17 | Stop reason: SDUPTHER

## 2020-02-14 RX ORDER — PANTOPRAZOLE SODIUM 40 MG/1
40 TABLET, DELAYED RELEASE ORAL DAILY
Qty: 90 TABLET | Refills: 3 | Status: SHIPPED | OUTPATIENT
Start: 2020-02-14 | End: 2021-03-19

## 2020-02-14 RX ORDER — TRAZODONE HYDROCHLORIDE 150 MG/1
150 TABLET ORAL NIGHTLY
Qty: 90 TABLET | Refills: 3 | Status: SHIPPED | OUTPATIENT
Start: 2020-02-14 | End: 2021-03-02 | Stop reason: SDUPTHER

## 2020-02-14 RX ORDER — HYDROCHLOROTHIAZIDE 25 MG/1
25 TABLET ORAL DAILY
Qty: 90 TABLET | Refills: 3 | Status: SHIPPED | OUTPATIENT
Start: 2020-02-14 | End: 2020-12-01 | Stop reason: SDUPTHER

## 2020-02-14 RX ORDER — FLUOXETINE HYDROCHLORIDE 40 MG/1
40 CAPSULE ORAL DAILY
Qty: 90 CAPSULE | Refills: 3 | Status: SHIPPED | OUTPATIENT
Start: 2020-02-14 | End: 2021-03-02 | Stop reason: SDUPTHER

## 2020-02-14 RX ORDER — BACLOFEN 10 MG/1
10 TABLET ORAL 3 TIMES DAILY
Qty: 90 TABLET | Refills: 3 | Status: SHIPPED | OUTPATIENT
Start: 2020-02-14 | End: 2020-04-08

## 2020-02-14 RX ORDER — METFORMIN HYDROCHLORIDE 500 MG/1
500 TABLET ORAL
Qty: 90 TABLET | Refills: 0 | Status: SHIPPED | OUTPATIENT
Start: 2020-02-14 | End: 2021-11-19 | Stop reason: SDUPTHER

## 2020-02-14 RX ORDER — LISINOPRIL 40 MG/1
40 TABLET ORAL DAILY
Qty: 90 TABLET | Refills: 3 | Status: SHIPPED | OUTPATIENT
Start: 2020-02-14 | End: 2020-05-08 | Stop reason: SDUPTHER

## 2020-02-14 RX ORDER — LIDOCAINE 50 MG/G
1 PATCH TOPICAL DAILY
Qty: 30 PATCH | Refills: 6 | Status: SHIPPED | OUTPATIENT
Start: 2020-02-14

## 2020-02-14 NOTE — PROGRESS NOTES
CC: followup of hypertension  HPI:  The patient is a 74 y.o. year old female who presents to the office for followup of hypertension.  The patient denies any chest pain, shortness of breath, headache, blurred vision, nausea or vomiting.  Review of labs reveals iron deficiency anemia and hypokalemia.  She complains of continued back pain and fatigue.  She reports swelling has improved.  She also reports continued cough.  EGD last year revealed mucus in upper airway.  She has decreased her metformin back to once a day.    PAST MEDICAL HISTORY:  Past Medical History:   Diagnosis Date    Alternating exotropia 4/18/2013    Bilateral cataracts     Depression     Diabetes mellitus     GERD (gastroesophageal reflux disease)     Hyperlipidemia     Hypertension     Menopausal state     Multiple gastric polyps     on EGD in 2004    Sickle cell trait        SURGICAL HISTORY:  Past Surgical History:   Procedure Laterality Date    CATARACT EXTRACTION W/  INTRAOCULAR LENS IMPLANT Left 06/05/2018    Dr. Castillo    CHOLECYSTECTOMY      laproscopic cholecystectomy    COLONOSCOPY N/A 8/13/2019    Procedure: COLONOSCOPY;  Surgeon: Jeffrey Maynard MD;  Location: [facility] ([address] [place] [address]);  Service: Endoscopy;  Laterality: N/A;    ESOPHAGOGASTRODUODENOSCOPY N/A 8/13/2019    Procedure: ESOPHAGOGASTRODUODENOSCOPY (EGD);  Surgeon: Jeffrey Maynard MD;  [facility]  Service: Endoscopy;  Laterality: N/A;    HYSTERECTOMY      DENISHA/BSO - fibroids    INTRAOCULAR PROSTHESES INSERTION Left 6/5/2018    Procedure: INSERTION-INTRAOCULAR LENS (IOL);  Surgeon: Ga Castillo MD;  Location: [facility] OR [address] [place] [address];  Service: Ophthalmology;  Laterality: Left;    Laparoscopic right hemicolectomy      OOPHORECTOMY      PHACOEMULSIFICATION OF CATARACT Left 6/5/2018    Procedure: PHACOEMULSIFICATION-ASPIRATION-CATARACT;  Surgeon: Ga Castillo MD;  Location: [facility] OR [address] [place] [address];  Service: Ophthalmology;  Laterality: Left;        MEDS:  Medcard reviewed and updated    ALLERGIES: Allergy Card reviewed and updated    SOCIAL HISTORY:   The patient is a nonsmoker.    PE:   APPEARANCE: Well nourished, well developed, in no acute distress.    CHEST: Lungs clear to auscultation with unlabored respirations.  CARDIOVASCULAR: Normal S1, S2. No murmurs. No carotid bruits. No pedal edema.  ABDOMEN: Bowel sounds normal. Not distended. Soft. No tenderness or masses.   PSYCHIATRIC: The patient is oriented to person, place, and time and has a pleasant affect.        ASSESSMENT/PLAN:  Val was seen today for follow-up and gait problem.    Diagnoses and all orders for this visit:    Cough  -     Ambulatory referral/consult to ENT; Future    CKD (chronic kidney disease) stage 3, GFR 30-59 ml/min  -     Ambulatory referral/consult to Nephrology; Future    Essential hypertension  -     lisinopril (PRINIVIL,ZESTRIL) 40 MG tablet; Take 1 tablet (40 mg total) by mouth once daily.  -     hydroCHLOROthiazide (HYDRODIURIL) 25 MG tablet; Take 1 tablet (25 mg total) by mouth once daily.    DM type 2 without retinopathy  -     metFORMIN (GLUCOPHAGE) 500 MG tablet; Take 1 tablet (500 mg total) by mouth daily with breakfast.    Iron deficiency anemia, unspecified iron deficiency anemia type  -     Occult blood x 1, stool; Future    Hypokalemia  -     replace with potassium rich foods    Scoliosis of lumbar spine, unspecified scoliosis type  -     baclofen (LIORESAL) 10 MG tablet; Take 1 tablet (10 mg total) by mouth 3 (three) times daily.  -     lidocaine (LIDODERM) 5 %; Place 1 patch onto the skin once daily. Apply to affected area daily for 12 hours, then remove for 12 hours.    Hyperlipidemia, unspecified hyperlipidemia type  -     lovastatin (MEVACOR) 40 MG tablet; Take 1 tablet (40 mg total) by mouth nightly.    Insomnia, unspecified type  -     traZODone (DESYREL) 150 MG tablet; Take 1 tablet (150 mg total) by mouth nightly.  -     diazePAM (VALIUM) 5 MG  tablet; Take 1 tablet (5 mg total) by mouth nightly.    Depression, unspecified depression type  -     FLUoxetine 40 MG capsule; Take 1 capsule (40 mg total) by mouth once daily.    Gastroesophageal reflux disease, esophagitis presence not specified  -     pantoprazole (PROTONIX) 40 MG tablet; Take 1 tablet (40 mg total) by mouth once daily.

## 2020-02-14 NOTE — PATIENT INSTRUCTIONS
Potassium-Rich Foods  The normal adult diet usually contains 2,000 mg to 4,000 mg of potassium per day. More potassium is needed when you lose too much potassium from your body. This can happen if you have diarrhea or vomiting. It can also happen if you take a medicine to make you urinate more (diuretic). To increase the amount of potassium in your diet, include these high-potassium foods.     [The (*) indicates foods highest in potassium.]  Vegetables  Artichokes. Cooked 1/2 cup, 200 mg to 300 mg*  Asparagus. Cooked 1/2 cup, 200 mg to 300 mg  Beans. White, red, valdez cooked 1/2 cup, 300 mg to 500 mg*  Beets. Cooked 1/2 cup, 200 mg to 300 mg  Broccoli. Cooked or raw 1 cup, 200 mg to 500 mg*  [place] sprouts. Cooked 1/2 cup, 200 mg to 300 mg  Cabbage. Raw 1 cup, 100 mg to 200 mg  Carrots. Raw or cooked 1/2 cup, 100 mg to 200 mg  Celery. Raw 1 cup, 200 mg to 300 mg  Lima beans. Fresh or frozen 1/2 cup, 300 mg to 500 mg*   Mushrooms. Raw or cooked 1/2 cup, 100 mg to 300 mg  Peas. Cooked 1/2 cup, 150 mg to 250 mg   Potatoes. Baked 1 medium, 500 mg to 900 mg*   Spinach. Cooked 1 cup, 800 mg to 900 mg*   Spinach. Raw 2 cups, 300 mg to 400 mg *  Squash, winter. Fresh, frozen, or cooked 1/2 cup, 200 mg to 400 mg   Tomato. Fresh 1 medium, 200 mg to 300 mg   Tomato juice. Canned 1/2 cup, 200 mg to 300 mg   Fruits  Apple juice. Unsweetened 1 cup, 200 mg to 300 mg   Apricots. Canned 1/2 cup, 200 mg to 300 mg   Apricots. Dried 4 pieces, 100 mg to 200 mg   Avocado. Raw 1/2 cup, 300 mg to 400 mg*  Banana. Fresh 1 small, 300 mg to 400 mg*   Cantaloupe. Fresh 1 cup diced, 300 mg to 400 mg*   Grape juice. Unsweetened 1 cup, 200 mg to 300 mg   Honeydew melon. Fresh 1 cup diced, 300 mg to 400 mg*   Orange. Fresh 1 medium, 200 mg to 300 mg    Orange juice. Unsweetened, fresh or frozen 1/2 cup, 200 mg to 300 mg  Pineapple juice. Unsweetened 1 cup, 300 mg to 400 mg   Prune juice. Unsweetened 1/2 cup, 300 mg to 400 mg*   Prunes. Dried 5  pieces, 300 mg to 400 mg*   Strawberries. Fresh or frozen 1 cup, 200 mg to 300 mg  Meat  Red meat. Cooked 3 ounces, 100 mg to 300 mg   Seafood  Cod, flounder, halibut. Cooked 3 ounces, 100 mg to 300 mg*  [place]. Cooked, 3 ounces 300 mg to 400 mg*   Scallops. Cooked 3 ounces, 200 mg to 300 mg*  Shrimp. Cooked 3/4 cup, 100 mg to 200 mg   Tuna. Fresh or canned 3/4 cup, 200 mg to 500 mg   Date Last Reviewed: 10/1/2016  © 2098-2111 [employer]. [address] [place] [address], [place] [address]. All rights reserved. This information is not intended as a substitute for professional medical care. Always follow your healthcare professional's instructions.      Start vitron C over the counter for iron replacement after stool has been collected.

## 2020-02-21 ENCOUNTER — TELEPHONE (OUTPATIENT)
Dept: INTERNAL MEDICINE | Facility: CLINIC | Age: 75
End: 2020-02-21

## 2020-02-21 NOTE — TELEPHONE ENCOUNTER
----- Message from Beryl Morejon sent at 2/21/2020 11:04 AM CST -----  Contact: Pt self Mobile/Home [phone]   Patient is calling in regards to her saying that her insurance company Medicare insurance will not cover her lidocaine (LIDODERM) 5 % medication and [facility] [employer] [id number] [address] [facility] was suppose to get in touch with you. Patient would like a call back to speak with you about this please.

## 2020-02-21 NOTE — TELEPHONE ENCOUNTER
Called and spoke with the patient and advised the Rx was not covered advised of good Rx card and she will get spouse to come and get the card.

## 2020-02-28 DIAGNOSIS — G47.00 INSOMNIA, UNSPECIFIED TYPE: ICD-10-CM

## 2020-02-28 RX ORDER — DIAZEPAM 5 MG/1
TABLET ORAL
Qty: 30 TABLET | Refills: 3 | Status: SHIPPED | OUTPATIENT
Start: 2020-02-28 | End: 2020-09-09

## 2020-02-29 ENCOUNTER — EXTERNAL CHRONIC CARE MANAGEMENT (OUTPATIENT)
Dept: PRIMARY CARE CLINIC | Facility: CLINIC | Age: 75
End: 2020-02-29
Payer: MEDICARE

## 2020-02-29 PROCEDURE — 99490 CHRNC CARE MGMT STAFF 1ST 20: CPT | Mod: S$PBB,,, | Performed by: INTERNAL MEDICINE

## 2020-02-29 PROCEDURE — 99490 CHRNC CARE MGMT STAFF 1ST 20: CPT | Mod: PBBFAC,PO | Performed by: INTERNAL MEDICINE

## 2020-02-29 PROCEDURE — 99490 PR CHRONIC CARE MGMT, 1ST 20 MIN: ICD-10-PCS | Mod: S$PBB,,, | Performed by: INTERNAL MEDICINE

## 2020-03-03 ENCOUNTER — OFFICE VISIT (OUTPATIENT)
Dept: OTOLARYNGOLOGY | Facility: CLINIC | Age: 75
End: 2020-03-03
Payer: MEDICARE

## 2020-03-03 VITALS
TEMPERATURE: 96 F | DIASTOLIC BLOOD PRESSURE: 72 MMHG | BODY MASS INDEX: 34.36 KG/M2 | SYSTOLIC BLOOD PRESSURE: 146 MMHG | HEART RATE: 79 BPM | WEIGHT: 200.19 LBS

## 2020-03-03 DIAGNOSIS — J31.0 CHRONIC RHINITIS: ICD-10-CM

## 2020-03-03 DIAGNOSIS — R05.9 COUGH: Primary | ICD-10-CM

## 2020-03-03 PROCEDURE — 99214 OFFICE O/P EST MOD 30 MIN: CPT | Mod: PBBFAC | Performed by: OTOLARYNGOLOGY

## 2020-03-03 PROCEDURE — 99203 OFFICE O/P NEW LOW 30 MIN: CPT | Mod: S$PBB,,, | Performed by: OTOLARYNGOLOGY

## 2020-03-03 PROCEDURE — 99999 PR PBB SHADOW E&M-EST. PATIENT-LVL IV: ICD-10-PCS | Mod: PBBFAC,,, | Performed by: OTOLARYNGOLOGY

## 2020-03-03 PROCEDURE — 99203 PR OFFICE/OUTPT VISIT, NEW, LEVL III, 30-44 MIN: ICD-10-PCS | Mod: S$PBB,,, | Performed by: OTOLARYNGOLOGY

## 2020-03-03 PROCEDURE — 99999 PR PBB SHADOW E&M-EST. PATIENT-LVL IV: CPT | Mod: PBBFAC,,, | Performed by: OTOLARYNGOLOGY

## 2020-03-03 NOTE — LETTER
March 3, 2020      Anh Atkinson MD  [address] [facility]  [place] [address]           [facility]  [address]  [facility] [address]  Phone: [phone]  Fax: [phone]          Patient: Val Bragg   MR Number: [id number]   YOB: 1945   Date of Visit: 3/3/2020       Dear Dr. Anh Atkinson:    Thank you for referring Val Bragg to me for evaluation. Attached you will find relevant portions of my assessment and plan of care.    If you have questions, please do not hesitate to call me. I look forward to following Val Bragg along with you.    Sincerely,    Juan Spencer MD    Enclosure  CC:  No Recipients    If you would like to receive this communication electronically, please contact [email or web address] or [phone] to request more information on [employer] Link access.    For providers and/or their staff who would like to refer a patient to [facility], please contact us through our one-stop-shop provider referral line, [facility] , at [phone].    If you feel you have received this communication in error or would no longer like to receive these types of communications, please e-mail [email or web address]

## 2020-03-03 NOTE — PROGRESS NOTES
"[facility]    Subjective:       Patient ID: Val Bragg is a 74 y.o. female.    Chief Complaint: Cough    HPI  Val Bragg is a 74 y.o. female who presents complaining of mucus production in her throat for "years", which has started bothering her recently, since an EGD in August. She has a productive cough with predominately white sputum, although it has been yellow and green in the last month or so. She admits to a URI about one month ago. The mucus and cough have been more frequent and persistent since her cold last month. She has chronic postnasal drip. She has not had sinus surgery. She reports remote allergy testing, but never had allergy shots. She admits to "allergies" since she was young. She also has chronic heartburn, for which she takes Rolaids and nightly protonix. She denies facial pain and pressure, and she is breathing well through her nose. She has chronic mild esophageal dysphagia, and is under the care of GI for this. She is breathing comfortably and eating a regular diet. She denies dysphagia (outside of the long term, unchanged reflux), odynophagia, throat pain, and otalgia. Her voice is normal. There is no hemoptysis or hematemesis. She does not use her Flonase regularly and has not taken any OTC cough medications.      Past Medical History:   Diagnosis Date    Alternating exotropia 4/18/2013    Bilateral cataracts     Depression     Diabetes mellitus     GERD (gastroesophageal reflux disease)     Hyperlipidemia     Hypertension     Menopausal state     Multiple gastric polyps     on EGD in 2004    Sickle cell trait        Past Surgical History:   Procedure Laterality Date    CATARACT EXTRACTION W/  INTRAOCULAR LENS IMPLANT Left 06/05/2018    Dr. Castillo    CHOLECYSTECTOMY      laproscopic cholecystectomy    COLONOSCOPY N/A 8/13/2019    Procedure: COLONOSCOPY;  Surgeon: Jeffrey Maynard MD;  Location: [facility] ([address] [place] [address]);  Service: Endoscopy;  " Laterality: N/A;    ESOPHAGOGASTRODUODENOSCOPY N/A 8/13/2019    Procedure: ESOPHAGOGASTRODUODENOSCOPY (EGD);  Surgeon: Jeffrey Maynard MD;  Location: [facility] (4TH FLR);  Service: Endoscopy;  Laterality: N/A;    HYSTERECTOMY      DENISHA/BSO - fibroids    INTRAOCULAR PROSTHESES INSERTION Left 6/5/2018    Procedure: INSERTION-INTRAOCULAR LENS (IOL);  Surgeon: Ga Castillo MD;  [facility];  Service: Ophthalmology;  Laterality: Left;    Laparoscopic right hemicolectomy      OOPHORECTOMY      PHACOEMULSIFICATION OF CATARACT Left 6/5/2018    Procedure: PHACOEMULSIFICATION-ASPIRATION-CATARACT;  Surgeon: Ga Castillo MD;  Location: [facility] OR [address] [place] [address];  Service: Ophthalmology;  Laterality: Left;       Current Outpatient Medications:     baclofen (LIORESAL) 10 MG tablet, Take 1 tablet (10 mg total) by mouth 3 (three) times daily., Disp: 90 tablet, Rfl: 3    blood sugar diagnostic Strp, 1 strip by Misc.(Non-Drug; Combo Route) route once daily., Disp: 100 strip, Rfl: 4    diazePAM (VALIUM) 5 MG tablet, TAKE 1 TABLET(5 MG) BY MOUTH EVERY NIGHT, Disp: 30 tablet, Rfl: 3    FLUoxetine 40 MG capsule, Take 1 capsule (40 mg total) by mouth once daily., Disp: 90 capsule, Rfl: 3    gabapentin (NEURONTIN) 300 MG capsule, Take one cap in the morning and two caps in the evening, Disp: 270 capsule, Rfl: 0    hydroCHLOROthiazide (HYDRODIURIL) 25 MG tablet, Take 1 tablet (25 mg total) by mouth once daily., Disp: 90 tablet, Rfl: 3    hydrocortisone 2.5 % cream, Apply topically 2 (two) times daily., Disp: 20 g, Rfl: 1    lidocaine (LIDODERM) 5 %, Place 1 patch onto the skin once daily. Apply to affected area daily for 12 hours, then remove for 12 hours., Disp: 30 patch, Rfl: 6    lisinopril (PRINIVIL,ZESTRIL) 40 MG tablet, Take 1 tablet (40 mg total) by mouth once daily., Disp: 90 tablet, Rfl: 3    lovastatin (MEVACOR) 40 MG tablet, Take 1 tablet (40 mg total) by mouth nightly., Disp: 90 tablet, Rfl:  3    metFORMIN (GLUCOPHAGE) 500 MG tablet, Take 1 tablet (500 mg total) by mouth daily with breakfast., Disp: 90 tablet, Rfl: 0    mometasone (NASONEX) 50 mcg/actuation nasal spray, 2 sprays by Nasal route every evening., Disp: 1 each, Rfl: 3    multivitamin capsule, Take 1 capsule by mouth once daily., Disp: , Rfl:     NIFEdipine (PROCARDIA-XL) 90 MG (OSM) 24 hr tablet, TAKE 1 TABLET(90 MG) BY MOUTH EVERY DAY, Disp: 90 tablet, Rfl: 3    pantoprazole (PROTONIX) 40 MG tablet, Take 1 tablet (40 mg total) by mouth once daily., Disp: 90 tablet, Rfl: 3    traZODone (DESYREL) 150 MG tablet, Take 1 tablet (150 mg total) by mouth nightly., Disp: 90 tablet, Rfl: 3    Review of patient's allergies indicates:   Allergen Reactions    Darvocet a500 [propoxyphene n-acetaminophen] Anaphylaxis       Social History     Socioeconomic History    Marital status:      Spouse name: Not on file    Number of children: Not on file    Years of education: Not on file    Highest education level: Not on file   Occupational History    Not on file   Social Needs    Financial resource strain: Not on file    Food insecurity:     Worry: Not on file     Inability: Not on file    Transportation needs:     Medical: Not on file     Non-medical: Not on file   Tobacco Use    Smoking status: Never Smoker    Smokeless tobacco: Never Used   Substance and Sexual Activity    Alcohol use: No    Drug use: No    Sexual activity: Not Currently     Partners: Male     Birth control/protection: None   Lifestyle    Physical activity:     Days per week: Not on file     Minutes per session: Not on file    Stress: Not on file   Relationships    Social connections:     Talks on phone: Not on file     Gets together: Not on file     Attends [employer]ical service: Not on file     Active member of club or organization: Not on file     Attends meetings of clubs or organizations: Not on file     Relationship status: Not on file   Other Topics Concern     Are you pregnant or think you may be? Not Asked    Breast-feeding Not Asked   Social History Narrative    Not on file       Family History   Problem Relation Age of Onset    Diabetes Mother     Stroke Mother     Hypertension Father     Heart disease Father     Hypertension Sister     Diabetes Sister     Cataracts Sister     Stroke Sister     Hypertension Brother     Cancer Brother         bladder    Cataracts Brother     Stroke Brother     Diabetes Paternal Aunt     Stroke Sister     Colon cancer Sister 60    Breast cancer Neg Hx     Ovarian cancer Neg Hx     Melanoma Neg Hx     Amblyopia Neg Hx     Blindness Neg Hx     Glaucoma Neg Hx     Macular degeneration Neg Hx     Retinal detachment Neg Hx     Strabismus Neg Hx     Thyroid disease Neg Hx     Esophageal cancer Neg Hx        Review of Systems   Constitutional: Negative for activity change, appetite change, chills, fatigue, fever and unexpected weight change.   HENT: Positive for postnasal drip and trouble swallowing. Negative for congestion, dental problem, drooling, ear discharge, ear pain, facial swelling, hearing loss, mouth sores, nosebleeds, rhinorrhea, sinus pressure, sneezing, sore throat, tinnitus and voice change.    Eyes: Negative for pain and visual disturbance.   Respiratory: Positive for cough. Negative for choking and shortness of breath.    Cardiovascular: Negative for chest pain, palpitations and leg swelling.   Gastrointestinal: Negative for abdominal pain, constipation, diarrhea, nausea and vomiting.   Endocrine: Negative for cold intolerance and heat intolerance.   Genitourinary: Negative for difficulty urinating, dysuria and hematuria.   Musculoskeletal: Negative for arthralgias, back pain, gait problem, myalgias, neck pain and neck stiffness.   Skin: Negative for rash and wound.   Allergic/Immunologic: Negative for environmental allergies and immunocompromised state.   Neurological: Negative for dizziness,  facial asymmetry, speech difficulty, weakness, light-headedness, numbness and headaches.   Hematological: Negative for adenopathy. Does not bruise/bleed easily.   Psychiatric/Behavioral: Negative for dysphoric mood. The patient is not nervous/anxious.        Objective:     Physical Exam   Constitutional: She is oriented to person, place, and time. She appears well-developed and well-nourished. No distress.   HENT:   Head: Normocephalic and atraumatic.   Right Ear: Hearing, tympanic membrane, external ear and ear canal normal.   Left Ear: Hearing, tympanic membrane, external ear and ear canal normal.   Nose: No rhinorrhea, nasal deformity or septal deviation. No epistaxis. Right sinus exhibits no maxillary sinus tenderness and no frontal sinus tenderness. Left sinus exhibits no maxillary sinus tenderness and no frontal sinus tenderness.   Mouth/Throat: Uvula is midline, oropharynx is clear and moist and mucous membranes are normal. She does not have dentures. No oral lesions. No trismus in the jaw. Normal dentition. No dental caries. No oropharyngeal exudate or posterior oropharyngeal edema.   Eyes: Pupils are equal, round, and reactive to light. EOM and lids are normal. Right eye exhibits no chemosis. Left eye exhibits no chemosis. No scleral icterus.   Neck: Trachea normal, normal range of motion, full passive range of motion without pain and phonation normal. No muscular tenderness present. Carotid bruit is not present. No thyroid mass and no thyromegaly present.   Cardiovascular: Normal rate, regular rhythm and intact distal pulses.   Pulmonary/Chest: Effort normal. No accessory muscle usage or stridor. No respiratory distress.   Abdominal: Normal appearance. There is no tenderness.   Musculoskeletal:        Right shoulder: She exhibits normal range of motion and no deformity.        Left shoulder: She exhibits normal range of motion and no deformity.   Lymphadenopathy:        Head (right side): No submental and  no occipital adenopathy present.        Head (left side): No submental and no occipital adenopathy present.     She has no cervical adenopathy.        Right cervical: No deep cervical and no posterior cervical adenopathy present.       Left cervical: No deep cervical and no posterior cervical adenopathy present.   Neurological: She is alert and oriented to person, place, and time. No cranial nerve deficit or sensory deficit. Gait normal.   Skin: Skin is warm and intact. No lesion and no rash noted.   Psychiatric: She has a normal mood and affect. Her speech is normal and behavior is normal. Thought content normal.   Vitals reviewed.       Refuses tongue blades or throat instrumentation    Assessment & Plan:       Problem List Items Addressed This Visit     Chronic rhinitis     She describes chronic, probably nonallergic rhinitis. We discussed using her Flonase daily as prescribed and augmenting it with saline nasal rinses. We also discussed how a cough that lingers after a URI is normal in many cases. I will get a CXR to be safe. She lacks any of the other symptoms suggesting head and neck cancer, including pain, odynophagia, otalgia, and voice changes.     If her symptoms persist after being compliant with this regimen for about a month, I recommended that she contact Dr. Ramses Wu at [facility] for consideration of allergy testing.          Cough

## 2020-03-03 NOTE — ASSESSMENT & PLAN NOTE
She describes chronic, probably nonallergic rhinitis. We discussed using her Flonase daily as prescribed and augmenting it with saline nasal rinses. We also discussed how a cough that lingers after a URI is normal in many cases. I will get a CXR to be safe. She lacks any of the other symptoms suggesting head and neck cancer, including pain, odynophagia, otalgia, and voice changes.     If her symptoms persist after being compliant with this regimen for about a month, I recommended that she contact Dr. Ramses Wu at [facility] for consideration of allergy testing.

## 2020-03-03 NOTE — PATIENT INSTRUCTIONS
Please get the CXR,  Use the flonase. Try some mucinex.  Reach out to Dr. Wu at [facility] if you are not improved in 4-6 weeks.

## 2020-03-11 ENCOUNTER — PATIENT OUTREACH (OUTPATIENT)
Dept: ADMINISTRATIVE | Facility: OTHER | Age: 75
End: 2020-03-11

## 2020-03-11 ENCOUNTER — HOSPITAL ENCOUNTER (OUTPATIENT)
Dept: RADIOLOGY | Facility: HOSPITAL | Age: 75
Discharge: HOME | End: 2020-03-11
Attending: OTOLARYNGOLOGY
Payer: MEDICARE

## 2020-03-11 DIAGNOSIS — R05.9 COUGH: ICD-10-CM

## 2020-03-11 DIAGNOSIS — J31.0 CHRONIC RHINITIS: ICD-10-CM

## 2020-03-11 PROCEDURE — 71046 X-RAY EXAM CHEST 2 VIEWS: CPT | Mod: TC,PN

## 2020-03-11 PROCEDURE — 71046 X-RAY EXAM CHEST 2 VIEWS: CPT | Mod: 26,,, | Performed by: RADIOLOGY

## 2020-03-11 PROCEDURE — 71046 XR CHEST PA AND LATERAL: ICD-10-PCS | Mod: 26,,, | Performed by: RADIOLOGY

## 2020-03-12 NOTE — PROGRESS NOTES
Chart reviewed.   Immunizations: Triggered Imm Registry     Orders placed: n/a  Upcoming appts to satisfy JACINTO topics: n/a

## 2020-03-16 ENCOUNTER — TELEPHONE (OUTPATIENT)
Dept: NEPHROLOGY | Facility: CLINIC | Age: 75
End: 2020-03-16

## 2020-03-16 NOTE — TELEPHONE ENCOUNTER
----- Message from Yessenia Peck sent at 3/16/2020  3:22 PM CDT -----  Contact: Pt [phone]  Pt wants to know if she should come in for her apt. Please call back     Spoke to pt and informed her as soon as the virtual appts are set up I will give her a call

## 2020-03-31 ENCOUNTER — EXTERNAL CHRONIC CARE MANAGEMENT (OUTPATIENT)
Dept: PRIMARY CARE CLINIC | Facility: CLINIC | Age: 75
End: 2020-03-31
Payer: MEDICARE

## 2020-03-31 PROCEDURE — 99490 CHRNC CARE MGMT STAFF 1ST 20: CPT | Mod: PBBFAC,PO | Performed by: INTERNAL MEDICINE

## 2020-03-31 PROCEDURE — 99490 PR CHRONIC CARE MGMT, 1ST 20 MIN: ICD-10-PCS | Mod: S$PBB,,, | Performed by: INTERNAL MEDICINE

## 2020-03-31 PROCEDURE — 99490 CHRNC CARE MGMT STAFF 1ST 20: CPT | Mod: S$PBB,,, | Performed by: INTERNAL MEDICINE

## 2020-04-08 DIAGNOSIS — M41.9 SCOLIOSIS OF LUMBAR SPINE, UNSPECIFIED SCOLIOSIS TYPE: ICD-10-CM

## 2020-04-08 RX ORDER — BACLOFEN 10 MG/1
TABLET ORAL
Qty: 90 TABLET | Refills: 3 | Status: SHIPPED | OUTPATIENT
Start: 2020-04-08 | End: 2020-10-27

## 2020-04-28 ENCOUNTER — TELEPHONE (OUTPATIENT)
Dept: NEPHROLOGY | Facility: CLINIC | Age: 75
End: 2020-04-28

## 2020-04-29 ENCOUNTER — TELEPHONE (OUTPATIENT)
Dept: NEPHROLOGY | Facility: CLINIC | Age: 75
End: 2020-04-29

## 2020-04-30 ENCOUNTER — EXTERNAL CHRONIC CARE MANAGEMENT (OUTPATIENT)
Dept: PRIMARY CARE CLINIC | Facility: CLINIC | Age: 75
End: 2020-04-30
Payer: MEDICARE

## 2020-04-30 PROCEDURE — 99490 CHRNC CARE MGMT STAFF 1ST 20: CPT | Mod: S$PBB,,, | Performed by: INTERNAL MEDICINE

## 2020-04-30 PROCEDURE — 99490 PR CHRONIC CARE MGMT, 1ST 20 MIN: ICD-10-PCS | Mod: S$PBB,,, | Performed by: INTERNAL MEDICINE

## 2020-04-30 PROCEDURE — 99490 CHRNC CARE MGMT STAFF 1ST 20: CPT | Mod: PBBFAC,PO | Performed by: INTERNAL MEDICINE

## 2020-05-08 DIAGNOSIS — I10 ESSENTIAL HYPERTENSION: ICD-10-CM

## 2020-05-08 RX ORDER — LISINOPRIL 40 MG/1
40 TABLET ORAL DAILY
Qty: 90 TABLET | Refills: 3 | Status: SHIPPED | OUTPATIENT
Start: 2020-05-08 | End: 2021-02-14 | Stop reason: SDUPTHER

## 2020-05-31 ENCOUNTER — EXTERNAL CHRONIC CARE MANAGEMENT (OUTPATIENT)
Dept: PRIMARY CARE CLINIC | Facility: CLINIC | Age: 75
End: 2020-05-31
Payer: MEDICARE

## 2020-05-31 PROCEDURE — 99490 CHRNC CARE MGMT STAFF 1ST 20: CPT | Mod: S$PBB,,, | Performed by: INTERNAL MEDICINE

## 2020-05-31 PROCEDURE — 99490 PR CHRONIC CARE MGMT, 1ST 20 MIN: ICD-10-PCS | Mod: S$PBB,,, | Performed by: INTERNAL MEDICINE

## 2020-05-31 PROCEDURE — 99490 CHRNC CARE MGMT STAFF 1ST 20: CPT | Mod: PBBFAC,PO | Performed by: INTERNAL MEDICINE

## 2020-06-30 ENCOUNTER — EXTERNAL CHRONIC CARE MANAGEMENT (OUTPATIENT)
Dept: PRIMARY CARE CLINIC | Facility: CLINIC | Age: 75
End: 2020-06-30
Payer: MEDICARE

## 2020-06-30 PROCEDURE — 99490 PR CHRONIC CARE MGMT, 1ST 20 MIN: ICD-10-PCS | Mod: S$PBB,,, | Performed by: INTERNAL MEDICINE

## 2020-06-30 PROCEDURE — 99490 CHRNC CARE MGMT STAFF 1ST 20: CPT | Mod: S$PBB,,, | Performed by: INTERNAL MEDICINE

## 2020-06-30 PROCEDURE — 99490 CHRNC CARE MGMT STAFF 1ST 20: CPT | Mod: PBBFAC,PO | Performed by: INTERNAL MEDICINE

## 2020-07-31 ENCOUNTER — EXTERNAL CHRONIC CARE MANAGEMENT (OUTPATIENT)
Dept: PRIMARY CARE CLINIC | Facility: CLINIC | Age: 75
End: 2020-07-31
Payer: MEDICARE

## 2020-07-31 PROCEDURE — 99490 CHRNC CARE MGMT STAFF 1ST 20: CPT | Mod: PBBFAC,PO | Performed by: INTERNAL MEDICINE

## 2020-07-31 PROCEDURE — 99490 CHRNC CARE MGMT STAFF 1ST 20: CPT | Mod: S$PBB,,, | Performed by: INTERNAL MEDICINE

## 2020-07-31 PROCEDURE — 99490 PR CHRONIC CARE MGMT, 1ST 20 MIN: ICD-10-PCS | Mod: S$PBB,,, | Performed by: INTERNAL MEDICINE

## 2020-08-22 DIAGNOSIS — E11.9 TYPE 2 DIABETES MELLITUS WITHOUT COMPLICATION: ICD-10-CM

## 2020-08-31 ENCOUNTER — EXTERNAL CHRONIC CARE MANAGEMENT (OUTPATIENT)
Dept: PRIMARY CARE CLINIC | Facility: CLINIC | Age: 75
End: 2020-08-31
Payer: MEDICARE

## 2020-08-31 PROCEDURE — 99490 CHRNC CARE MGMT STAFF 1ST 20: CPT | Mod: S$PBB,,, | Performed by: INTERNAL MEDICINE

## 2020-08-31 PROCEDURE — 99490 CHRNC CARE MGMT STAFF 1ST 20: CPT | Mod: PBBFAC,PO | Performed by: INTERNAL MEDICINE

## 2020-08-31 PROCEDURE — 99490 PR CHRONIC CARE MGMT, 1ST 20 MIN: ICD-10-PCS | Mod: S$PBB,,, | Performed by: INTERNAL MEDICINE

## 2020-09-09 DIAGNOSIS — G47.00 INSOMNIA, UNSPECIFIED TYPE: ICD-10-CM

## 2020-09-09 RX ORDER — DIAZEPAM 5 MG/1
TABLET ORAL
Qty: 30 TABLET | Refills: 3 | Status: SHIPPED | OUTPATIENT
Start: 2020-09-09 | End: 2021-03-19

## 2020-09-17 ENCOUNTER — PATIENT OUTREACH (OUTPATIENT)
Dept: ADMINISTRATIVE | Facility: HOSPITAL | Age: 75
End: 2020-09-17

## 2020-09-28 ENCOUNTER — TELEPHONE (OUTPATIENT)
Dept: INTERNAL MEDICINE | Facility: CLINIC | Age: 75
End: 2020-09-28

## 2020-09-28 NOTE — TELEPHONE ENCOUNTER
----- Message from Cristinesolo Wilkins sent at 9/28/2020  4:40 PM CDT -----  Contact: Pt [phone]  Patient is requesting to check her kidney levels. Wants that order added to her lab appt.    Please call and advise.    Thank You

## 2020-09-29 ENCOUNTER — PATIENT MESSAGE (OUTPATIENT)
Dept: OTHER | Facility: OTHER | Age: 75
End: 2020-09-29

## 2020-09-30 ENCOUNTER — EXTERNAL CHRONIC CARE MANAGEMENT (OUTPATIENT)
Dept: PRIMARY CARE CLINIC | Facility: CLINIC | Age: 75
End: 2020-09-30
Payer: MEDICARE

## 2020-09-30 PROCEDURE — 99490 CHRNC CARE MGMT STAFF 1ST 20: CPT | Mod: PBBFAC,PO | Performed by: INTERNAL MEDICINE

## 2020-09-30 PROCEDURE — 99490 CHRNC CARE MGMT STAFF 1ST 20: CPT | Mod: S$PBB,,, | Performed by: INTERNAL MEDICINE

## 2020-09-30 PROCEDURE — 99490 PR CHRONIC CARE MGMT, 1ST 20 MIN: ICD-10-PCS | Mod: S$PBB,,, | Performed by: INTERNAL MEDICINE

## 2020-10-01 ENCOUNTER — LAB VISIT (OUTPATIENT)
Dept: LAB | Facility: HOSPITAL | Age: 75
End: 2020-10-01
Attending: INTERNAL MEDICINE
Payer: MEDICARE

## 2020-10-01 ENCOUNTER — CLINICAL SUPPORT (OUTPATIENT)
Dept: PRIMARY CARE CLINIC | Facility: CLINIC | Age: 75
End: 2020-10-01
Payer: MEDICARE

## 2020-10-01 DIAGNOSIS — E11.9 TYPE 2 DIABETES MELLITUS WITHOUT COMPLICATION: ICD-10-CM

## 2020-10-01 DIAGNOSIS — Z23 NEED FOR INFLUENZA VACCINATION: Primary | ICD-10-CM

## 2020-10-01 LAB
ESTIMATED AVG GLUCOSE: 174 MG/DL (ref 68–131)
HBA1C MFR BLD HPLC: 7.7 % (ref 4–5.6)

## 2020-10-01 PROCEDURE — 90694 VACC AIIV4 NO PRSRV 0.5ML IM: CPT | Mod: PBBFAC,PN

## 2020-10-01 PROCEDURE — 83036 HEMOGLOBIN GLYCOSYLATED A1C: CPT

## 2020-10-01 PROCEDURE — 99999 PR PBB SHADOW E&M-EST. PATIENT-LVL II: CPT | Mod: PBBFAC,,,

## 2020-10-01 PROCEDURE — 36415 COLL VENOUS BLD VENIPUNCTURE: CPT | Mod: PN

## 2020-10-01 PROCEDURE — 99212 OFFICE O/P EST SF 10 MIN: CPT | Mod: PBBFAC,PN

## 2020-10-01 PROCEDURE — 99999 PR PBB SHADOW E&M-EST. PATIENT-LVL II: ICD-10-PCS | Mod: PBBFAC,,,

## 2020-10-01 PROCEDURE — G0008 ADMIN INFLUENZA VIRUS VAC: HCPCS | Mod: PBBFAC,PN

## 2020-10-01 NOTE — PROGRESS NOTES
Two patient identifiers verified.  Allergies reviewed.  Flu Hd 0.5 ml IM administered to Left deltoid per MD order.  Patient tolerated injection well; no redness, bleeding, or bruising noted to injection site.  Patient instructed to remain in clinic setting for 15 minutes.  Verbalizes understanding.

## 2020-10-30 DIAGNOSIS — E11.9 TYPE 2 DIABETES MELLITUS WITHOUT COMPLICATION: ICD-10-CM

## 2020-10-31 ENCOUNTER — EXTERNAL CHRONIC CARE MANAGEMENT (OUTPATIENT)
Dept: PRIMARY CARE CLINIC | Facility: CLINIC | Age: 75
End: 2020-10-31
Payer: MEDICARE

## 2020-10-31 PROCEDURE — 99490 CHRNC CARE MGMT STAFF 1ST 20: CPT | Mod: S$PBB,,, | Performed by: INTERNAL MEDICINE

## 2020-10-31 PROCEDURE — 99490 PR CHRONIC CARE MGMT, 1ST 20 MIN: ICD-10-PCS | Mod: S$PBB,,, | Performed by: INTERNAL MEDICINE

## 2020-10-31 PROCEDURE — 99490 CHRNC CARE MGMT STAFF 1ST 20: CPT | Mod: PBBFAC,PO | Performed by: INTERNAL MEDICINE

## 2020-11-10 RX ORDER — GABAPENTIN 300 MG/1
CAPSULE ORAL
Qty: 270 CAPSULE | Refills: 0 | Status: SHIPPED | OUTPATIENT
Start: 2020-11-10 | End: 2021-03-30

## 2020-11-16 ENCOUNTER — TELEPHONE (OUTPATIENT)
Dept: INTERNAL MEDICINE | Facility: CLINIC | Age: 75
End: 2020-11-16

## 2020-11-18 ENCOUNTER — TELEPHONE (OUTPATIENT)
Dept: INTERNAL MEDICINE | Facility: CLINIC | Age: 75
End: 2020-11-18

## 2020-11-18 NOTE — TELEPHONE ENCOUNTER
----- Message from Kathy Smith sent at 11/18/2020  1:55 PM CST -----  Contact: [phone]  Patient is returning a phone call.  Who left a message for the patient: Dr Atkinson's nurse  Does patient know what this is regarding:  no  Comments:

## 2020-11-18 NOTE — TELEPHONE ENCOUNTER
Spoke with pt to advise of results and MD recommendations.    Verbalized understanding and she is agreeable to virtual appt as scheduled on 11/25/20 at 1:15 pm.

## 2020-11-25 ENCOUNTER — OFFICE VISIT (OUTPATIENT)
Dept: INTERNAL MEDICINE | Facility: CLINIC | Age: 75
End: 2020-11-25
Payer: MEDICARE

## 2020-11-25 ENCOUNTER — TELEPHONE (OUTPATIENT)
Dept: INTERNAL MEDICINE | Facility: CLINIC | Age: 75
End: 2020-11-25

## 2020-11-25 DIAGNOSIS — N18.32 STAGE 3B CHRONIC KIDNEY DISEASE: ICD-10-CM

## 2020-11-25 DIAGNOSIS — E11.9 DM TYPE 2 WITHOUT RETINOPATHY: ICD-10-CM

## 2020-11-25 DIAGNOSIS — I10 ESSENTIAL HYPERTENSION: Primary | ICD-10-CM

## 2020-11-25 PROCEDURE — 99443 PR PHYSICIAN TELEPHONE EVALUATION 21-30 MIN: ICD-10-PCS | Mod: 95,,, | Performed by: INTERNAL MEDICINE

## 2020-11-25 PROCEDURE — 99443 PR PHYSICIAN TELEPHONE EVALUATION 21-30 MIN: CPT | Mod: 95,,, | Performed by: INTERNAL MEDICINE

## 2020-11-25 NOTE — TELEPHONE ENCOUNTER
----- Message from Karli Whitaker sent at 11/25/2020  1:27 PM CST -----  Contact: [phone]  Pt trying to reach la to do virtual visit she is unable to log on

## 2020-11-25 NOTE — TELEPHONE ENCOUNTER
Spoke to pt and told her I was changing apt to audio and that dr garza would be calling her for apt  She expressed understanding

## 2020-11-25 NOTE — PROGRESS NOTES
Established Patient - Audio Only Telehealth Visit     The patient location is:  Home  The chief complaint leading to consultation is:  Follow-up of hypertension and diabetes  Visit type: Virtual visit with audio only (telephone)  Total time spent with patient:  21 min       The reason for the audio only service rather than synchronous audio and video virtual visit was related to technical difficulties or patient preference/necessity.     Each patient to whom I provide medical services by telemedicine is:  (1) informed of the relationship between the physician and patient and the respective role of any other health care provider with respect to management of the patient; and (2) notified that they may decline to receive medical services by telemedicine and may withdraw from such care at any time. Patient verbally consented to receive this service via voice-only telephone call.       CC: followup of hypertension and diabetes  HPI:  The patient is a 75 y.o. year old female who presents to the office for followup of hypertension and diabetes.  The patient denies any chest pain, shortness of breath, headache, blurred vision, nausea or vomiting.  She reports she has not been adherent to diet, and has no energy.  She reports she is sleeping okay.  The patient complains of intermittent cough.  She also reports incontinence of stool, denies any constipation.  Her symptoms are intermittent.  She complains of diarrhea secondary to metformin.  She also complains of intermittent back pain, which she describes as a throbbing sensation.      PAST MEDICAL HISTORY:  Past Medical History:   Diagnosis Date    Alternating exotropia 4/18/2013    Bilateral cataracts     Depression     Diabetes mellitus     GERD (gastroesophageal reflux disease)     Hyperlipidemia     Hypertension     Menopausal state     Multiple gastric polyps     on EGD in 2004    Sickle cell trait        SURGICAL HISTORY:  Past Surgical History:   Procedure  Laterality Date    CATARACT EXTRACTION W/  INTRAOCULAR LENS IMPLANT Left 06/05/2018    Dr. Castillo    CHOLECYSTECTOMY      laproscopic cholecystectomy    COLONOSCOPY N/A 8/13/2019    Procedure: COLONOSCOPY;  Surgeon: Jeffrey Maynard MD;  [facility]  Service: Endoscopy;  Laterality: N/A;    ESOPHAGOGASTRODUODENOSCOPY N/A 8/13/2019    Procedure: ESOPHAGOGASTRODUODENOSCOPY (EGD);  Surgeon: Jeffrey Maynard MD;  [facility]  Service: Endoscopy;  Laterality: N/A;    HYSTERECTOMY      DENISHA/BSO - fibroids    INTRAOCULAR PROSTHESES INSERTION Left 6/5/2018    Procedure: INSERTION-INTRAOCULAR LENS (IOL);  Surgeon: Ga Castillo MD;  Location: [facility] OR [address] [place] [address];  Service: Ophthalmology;  Laterality: Left;    Laparoscopic right hemicolectomy      OOPHORECTOMY      PHACOEMULSIFICATION OF CATARACT Left 6/5/2018    Procedure: PHACOEMULSIFICATION-ASPIRATION-CATARACT;  Surgeon: Ga Castillo MD;  Location: [facility] OR [address] [place] [address];  Service: Ophthalmology;  Laterality: Left;       MEDS:  Medcard reviewed and updated    ALLERGIES: Allergy Card reviewed and updated    SOCIAL HISTORY:   The patient is a nonsmoker.    PE:   APPEARANCE:  Sounds to be in no acute distress.    Audio only  PSYCHIATRIC: The patient is oriented to person, place, and time and has a pleasant affect.        ASSESSMENT/PLAN:  Diagnoses and all orders for this visit:    Essential hypertension  -     continue current medication    Stage 3b chronic kidney disease  -      stable  DM type 2 without retinopathy  -     Basic Metabolic Panel; Future  -     Hemoglobin A1C; Future  -      change metformin to Januvia secondary to diarrhea    Other orders  -     SITagliptin (JANUVIA) 50 MG Tab; Take 1 tablet (50 mg total) by mouth once daily.                                This service was not originating from a related E/M service provided within the previous 7 days nor will  to an E/M service or procedure within the next  24 hours or my soonest available appointment.  Prevailing standard of care was able to be met in this audio-only visit.

## 2020-11-30 ENCOUNTER — EXTERNAL CHRONIC CARE MANAGEMENT (OUTPATIENT)
Dept: PRIMARY CARE CLINIC | Facility: CLINIC | Age: 75
End: 2020-11-30
Payer: MEDICARE

## 2020-11-30 PROCEDURE — 99490 PR CHRONIC CARE MGMT, 1ST 20 MIN: ICD-10-PCS | Mod: S$PBB,,, | Performed by: INTERNAL MEDICINE

## 2020-11-30 PROCEDURE — G2058 PR CHRON CARE MGMT, EA ADDTL 20 MINS: ICD-10-PCS | Mod: S$PBB,,, | Performed by: INTERNAL MEDICINE

## 2020-11-30 PROCEDURE — G2058 CCM ADD 20MIN: HCPCS | Mod: S$PBB,,, | Performed by: INTERNAL MEDICINE

## 2020-11-30 PROCEDURE — 99490 CHRNC CARE MGMT STAFF 1ST 20: CPT | Mod: PBBFAC,PO | Performed by: INTERNAL MEDICINE

## 2020-11-30 PROCEDURE — G2058 CCM ADD 20MIN: HCPCS | Mod: PBBFAC,PO | Performed by: INTERNAL MEDICINE

## 2020-11-30 PROCEDURE — 99490 CHRNC CARE MGMT STAFF 1ST 20: CPT | Mod: S$PBB,,, | Performed by: INTERNAL MEDICINE

## 2020-12-01 DIAGNOSIS — I10 ESSENTIAL HYPERTENSION: ICD-10-CM

## 2020-12-02 RX ORDER — HYDROCHLOROTHIAZIDE 25 MG/1
25 TABLET ORAL DAILY
Qty: 90 TABLET | Refills: 3 | Status: SHIPPED | OUTPATIENT
Start: 2020-12-02 | End: 2022-03-07 | Stop reason: SDUPTHER

## 2020-12-03 ENCOUNTER — PES CALL (OUTPATIENT)
Dept: ADMINISTRATIVE | Facility: OTHER | Age: 75
End: 2020-12-03

## 2020-12-11 ENCOUNTER — PATIENT MESSAGE (OUTPATIENT)
Dept: OTHER | Facility: OTHER | Age: 75
End: 2020-12-11

## 2020-12-31 ENCOUNTER — EXTERNAL CHRONIC CARE MANAGEMENT (OUTPATIENT)
Dept: PRIMARY CARE CLINIC | Facility: CLINIC | Age: 75
End: 2020-12-31
Payer: MEDICARE

## 2020-12-31 PROCEDURE — 99490 CHRNC CARE MGMT STAFF 1ST 20: CPT | Mod: S$PBB,,, | Performed by: INTERNAL MEDICINE

## 2020-12-31 PROCEDURE — 99490 CHRNC CARE MGMT STAFF 1ST 20: CPT | Mod: PBBFAC,PO | Performed by: INTERNAL MEDICINE

## 2020-12-31 PROCEDURE — 99490 PR CHRONIC CARE MGMT, 1ST 20 MIN: ICD-10-PCS | Mod: S$PBB,,, | Performed by: INTERNAL MEDICINE

## 2021-01-31 ENCOUNTER — EXTERNAL CHRONIC CARE MANAGEMENT (OUTPATIENT)
Dept: PRIMARY CARE CLINIC | Facility: CLINIC | Age: 76
End: 2021-01-31
Payer: MEDICARE

## 2021-01-31 PROCEDURE — 99490 CHRNC CARE MGMT STAFF 1ST 20: CPT | Mod: PBBFAC,PO | Performed by: INTERNAL MEDICINE

## 2021-01-31 PROCEDURE — 99490 CHRNC CARE MGMT STAFF 1ST 20: CPT | Mod: S$PBB,,, | Performed by: INTERNAL MEDICINE

## 2021-01-31 PROCEDURE — 99490 PR CHRONIC CARE MGMT, 1ST 20 MIN: ICD-10-PCS | Mod: S$PBB,,, | Performed by: INTERNAL MEDICINE

## 2021-02-03 ENCOUNTER — PES CALL (OUTPATIENT)
Dept: ADMINISTRATIVE | Facility: CLINIC | Age: 76
End: 2021-02-03

## 2021-02-12 ENCOUNTER — PATIENT MESSAGE (OUTPATIENT)
Dept: INTERNAL MEDICINE | Facility: CLINIC | Age: 76
End: 2021-02-12

## 2021-02-17 DIAGNOSIS — E78.5 HYPERLIPIDEMIA, UNSPECIFIED HYPERLIPIDEMIA TYPE: ICD-10-CM

## 2021-02-17 RX ORDER — LOVASTATIN 40 MG/1
40 TABLET ORAL NIGHTLY
Qty: 90 TABLET | Refills: 3 | Status: SHIPPED | OUTPATIENT
Start: 2021-02-17 | End: 2021-02-23 | Stop reason: SDUPTHER

## 2021-02-18 DIAGNOSIS — E78.5 HYPERLIPIDEMIA, UNSPECIFIED HYPERLIPIDEMIA TYPE: ICD-10-CM

## 2021-02-22 ENCOUNTER — TELEPHONE (OUTPATIENT)
Dept: INTERNAL MEDICINE | Facility: CLINIC | Age: 76
End: 2021-02-22

## 2021-02-23 DIAGNOSIS — E78.5 HYPERLIPIDEMIA, UNSPECIFIED HYPERLIPIDEMIA TYPE: ICD-10-CM

## 2021-02-23 RX ORDER — LOVASTATIN 40 MG/1
40 TABLET ORAL NIGHTLY
Qty: 90 TABLET | Refills: 3 | Status: SHIPPED | OUTPATIENT
Start: 2021-02-23 | End: 2022-02-15 | Stop reason: SDUPTHER

## 2021-02-23 RX ORDER — LOVASTATIN 40 MG/1
40 TABLET ORAL NIGHTLY
Qty: 90 TABLET | Refills: 3 | Status: CANCELLED | OUTPATIENT
Start: 2021-02-23

## 2021-02-28 ENCOUNTER — EXTERNAL CHRONIC CARE MANAGEMENT (OUTPATIENT)
Dept: PRIMARY CARE CLINIC | Facility: CLINIC | Age: 76
End: 2021-02-28
Payer: MEDICARE

## 2021-02-28 PROCEDURE — 99490 CHRNC CARE MGMT STAFF 1ST 20: CPT | Mod: PBBFAC,PO | Performed by: INTERNAL MEDICINE

## 2021-02-28 PROCEDURE — 99490 PR CHRONIC CARE MGMT, 1ST 20 MIN: ICD-10-PCS | Mod: S$PBB,,, | Performed by: INTERNAL MEDICINE

## 2021-02-28 PROCEDURE — 99490 CHRNC CARE MGMT STAFF 1ST 20: CPT | Mod: S$PBB,,, | Performed by: INTERNAL MEDICINE

## 2021-03-01 DIAGNOSIS — G47.00 INSOMNIA, UNSPECIFIED TYPE: ICD-10-CM

## 2021-03-01 DIAGNOSIS — F32.A DEPRESSION, UNSPECIFIED DEPRESSION TYPE: ICD-10-CM

## 2021-03-02 RX ORDER — TRAZODONE HYDROCHLORIDE 150 MG/1
TABLET ORAL
Qty: 90 TABLET | Refills: 3 | Status: SHIPPED | OUTPATIENT
Start: 2021-03-02 | End: 2022-05-23 | Stop reason: SDUPTHER

## 2021-03-02 RX ORDER — FLUOXETINE HYDROCHLORIDE 40 MG/1
40 CAPSULE ORAL DAILY
Qty: 90 CAPSULE | Refills: 3 | Status: SHIPPED | OUTPATIENT
Start: 2021-03-02 | End: 2022-03-07 | Stop reason: SDUPTHER

## 2021-03-17 ENCOUNTER — PATIENT OUTREACH (OUTPATIENT)
Dept: ADMINISTRATIVE | Facility: HOSPITAL | Age: 76
End: 2021-03-17

## 2021-03-17 ENCOUNTER — TELEPHONE (OUTPATIENT)
Dept: INTERNAL MEDICINE | Facility: CLINIC | Age: 76
End: 2021-03-17
Payer: MEDICARE

## 2021-03-23 ENCOUNTER — OFFICE VISIT (OUTPATIENT)
Dept: INTERNAL MEDICINE | Facility: CLINIC | Age: 76
End: 2021-03-23
Payer: MEDICARE

## 2021-03-23 VITALS
TEMPERATURE: 97 F | BODY MASS INDEX: 36.4 KG/M2 | SYSTOLIC BLOOD PRESSURE: 150 MMHG | DIASTOLIC BLOOD PRESSURE: 75 MMHG | OXYGEN SATURATION: 95 % | WEIGHT: 213.19 LBS | HEIGHT: 64 IN | HEART RATE: 134 BPM

## 2021-03-23 DIAGNOSIS — D50.9 IRON DEFICIENCY ANEMIA, UNSPECIFIED IRON DEFICIENCY ANEMIA TYPE: ICD-10-CM

## 2021-03-23 DIAGNOSIS — E11.9 DM TYPE 2 WITHOUT RETINOPATHY: ICD-10-CM

## 2021-03-23 DIAGNOSIS — N18.32 STAGE 3B CHRONIC KIDNEY DISEASE: ICD-10-CM

## 2021-03-23 DIAGNOSIS — R60.9 EDEMA, UNSPECIFIED TYPE: ICD-10-CM

## 2021-03-23 DIAGNOSIS — I10 ESSENTIAL HYPERTENSION: Primary | ICD-10-CM

## 2021-03-23 DIAGNOSIS — M41.9 SCOLIOSIS OF LUMBAR SPINE, UNSPECIFIED SCOLIOSIS TYPE: ICD-10-CM

## 2021-03-23 PROCEDURE — 99214 OFFICE O/P EST MOD 30 MIN: CPT | Mod: PBBFAC,PO | Performed by: INTERNAL MEDICINE

## 2021-03-23 PROCEDURE — 99999 PR PBB SHADOW E&M-EST. PATIENT-LVL IV: CPT | Mod: PBBFAC,,, | Performed by: INTERNAL MEDICINE

## 2021-03-23 PROCEDURE — 99999 PR PBB SHADOW E&M-EST. PATIENT-LVL IV: ICD-10-PCS | Mod: PBBFAC,,, | Performed by: INTERNAL MEDICINE

## 2021-03-23 PROCEDURE — 99214 PR OFFICE/OUTPT VISIT, EST, LEVL IV, 30-39 MIN: ICD-10-PCS | Mod: S$PBB,,, | Performed by: INTERNAL MEDICINE

## 2021-03-23 PROCEDURE — 99214 OFFICE O/P EST MOD 30 MIN: CPT | Mod: S$PBB,,, | Performed by: INTERNAL MEDICINE

## 2021-03-23 RX ORDER — INSULIN PUMP SYRINGE, 3 ML
EACH MISCELLANEOUS
Qty: 1 EACH | Refills: 0 | Status: SHIPPED | OUTPATIENT
Start: 2021-03-23 | End: 2022-03-23

## 2021-03-23 RX ORDER — LANCETS
EACH MISCELLANEOUS
Qty: 100 EACH | Refills: 3 | Status: SHIPPED | OUTPATIENT
Start: 2021-03-23

## 2021-03-23 RX ORDER — FLASH GLUCOSE SCANNING READER
1 EACH MISCELLANEOUS 2 TIMES DAILY
Qty: 1 EACH | Refills: 0 | Status: SHIPPED | OUTPATIENT
Start: 2021-03-23 | End: 2021-08-09

## 2021-03-23 RX ORDER — FUROSEMIDE 40 MG/1
40 TABLET ORAL DAILY
Qty: 3 TABLET | Refills: 0 | Status: SHIPPED | OUTPATIENT
Start: 2021-03-23 | End: 2021-03-30 | Stop reason: SDUPTHER

## 2021-03-23 RX ORDER — FLASH GLUCOSE SENSOR
1 KIT MISCELLANEOUS
Qty: 2 KIT | Refills: 6 | Status: SHIPPED | OUTPATIENT
Start: 2021-03-23 | End: 2021-08-09

## 2021-03-26 ENCOUNTER — TELEPHONE (OUTPATIENT)
Dept: ADMINISTRATIVE | Facility: CLINIC | Age: 76
End: 2021-03-26

## 2021-03-31 ENCOUNTER — EXTERNAL CHRONIC CARE MANAGEMENT (OUTPATIENT)
Dept: PRIMARY CARE CLINIC | Facility: CLINIC | Age: 76
End: 2021-03-31
Payer: MEDICARE

## 2021-03-31 PROCEDURE — 99490 CHRNC CARE MGMT STAFF 1ST 20: CPT | Mod: PBBFAC,PO | Performed by: INTERNAL MEDICINE

## 2021-03-31 PROCEDURE — 99490 CHRNC CARE MGMT STAFF 1ST 20: CPT | Mod: S$PBB,,, | Performed by: INTERNAL MEDICINE

## 2021-03-31 PROCEDURE — 99490 PR CHRONIC CARE MGMT, 1ST 20 MIN: ICD-10-PCS | Mod: S$PBB,,, | Performed by: INTERNAL MEDICINE

## 2021-04-01 ENCOUNTER — TELEPHONE (OUTPATIENT)
Dept: INTERNAL MEDICINE | Facility: CLINIC | Age: 76
End: 2021-04-01
Payer: MEDICARE

## 2021-04-06 ENCOUNTER — PATIENT MESSAGE (OUTPATIENT)
Dept: INTERNAL MEDICINE | Facility: CLINIC | Age: 76
End: 2021-04-06

## 2021-04-09 ENCOUNTER — HOSPITAL ENCOUNTER (OUTPATIENT)
Dept: CARDIOLOGY | Facility: HOSPITAL | Age: 76
Discharge: HOME | End: 2021-04-09
Attending: INTERNAL MEDICINE
Payer: MEDICARE

## 2021-04-09 VITALS
BODY MASS INDEX: 36.37 KG/M2 | DIASTOLIC BLOOD PRESSURE: 70 MMHG | WEIGHT: 213 LBS | HEART RATE: 86 BPM | HEIGHT: 64 IN | SYSTOLIC BLOOD PRESSURE: 168 MMHG

## 2021-04-09 DIAGNOSIS — R60.9 EDEMA, UNSPECIFIED TYPE: ICD-10-CM

## 2021-04-09 LAB
ASCENDING AORTA: 3.26 CM
AV INDEX (PROSTH): 0.87
AV MEAN GRADIENT: 5 MMHG
AV PEAK GRADIENT: 8 MMHG
AV VALVE AREA: 2.62 CM2
AV VELOCITY RATIO: 0.82
BSA FOR ECHO PROCEDURE: 2.09 M2
CV ECHO LV RWT: 0.36 CM
DOP CALC AO PEAK VEL: 1.44 M/S
DOP CALC AO VTI: 26.39 CM
DOP CALC LVOT AREA: 3 CM2
DOP CALC LVOT DIAMETER: 1.96 CM
DOP CALC LVOT PEAK VEL: 1.18 M/S
DOP CALC LVOT STROKE VOLUME: 69.21 CM3
DOP CALCLVOT PEAK VEL VTI: 22.95 CM
E WAVE DECELERATION TIME: 191.87 MSEC
E/A RATIO: 0.99
E/E' RATIO: 8.6 M/S
ECHO LV POSTERIOR WALL: 0.9 CM (ref 0.6–1.1)
FRACTIONAL SHORTENING: 40 % (ref 28–44)
INTERVENTRICULAR SEPTUM: 0.88 CM (ref 0.6–1.1)
IVRT: 88.49 MSEC
LA MAJOR: 4.85 CM
LA MINOR: 5 CM
LA WIDTH: 3.92 CM
LEFT ATRIUM SIZE: 3.46 CM
LEFT ATRIUM VOLUME INDEX MOD: 26.7 ML/M2
LEFT ATRIUM VOLUME INDEX: 28.2 ML/M2
LEFT ATRIUM VOLUME MOD: 53.76 CM3
LEFT ATRIUM VOLUME: 56.77 CM3
LEFT INTERNAL DIMENSION IN SYSTOLE: 3.01 CM (ref 2.1–4)
LEFT VENTRICLE DIASTOLIC VOLUME INDEX: 58.37 ML/M2
LEFT VENTRICLE DIASTOLIC VOLUME: 117.32 ML
LEFT VENTRICLE MASS INDEX: 77 G/M2
LEFT VENTRICLE SYSTOLIC VOLUME INDEX: 17.5 ML/M2
LEFT VENTRICLE SYSTOLIC VOLUME: 35.27 ML
LEFT VENTRICULAR INTERNAL DIMENSION IN DIASTOLE: 4.98 CM (ref 3.5–6)
LEFT VENTRICULAR MASS: 154.86 G
LV LATERAL E/E' RATIO: 8.6 M/S
LV SEPTAL E/E' RATIO: 8.6 M/S
MV PEAK A VEL: 0.87 M/S
MV PEAK E VEL: 0.86 M/S
PISA TR MAX VEL: 3.06 M/S
PULM VEIN S/D RATIO: 1.74
PV PEAK D VEL: 0.46 M/S
PV PEAK S VEL: 0.8 M/S
RA MAJOR: 4.56 CM
RA PRESSURE: 3 MMHG
RA WIDTH: 3.41 CM
RIGHT VENTRICULAR END-DIASTOLIC DIMENSION: 3.07 CM
SINUS: 2.92 CM
STJ: 2.42 CM
TDI LATERAL: 0.1 M/S
TDI SEPTAL: 0.1 M/S
TDI: 0.1 M/S
TR MAX PG: 37 MMHG
TRICUSPID ANNULAR PLANE SYSTOLIC EXCURSION: 1.61 CM
TV REST PULMONARY ARTERY PRESSURE: 40 MMHG

## 2021-04-09 PROCEDURE — 93306 TTE W/DOPPLER COMPLETE: CPT | Mod: 26,,, | Performed by: INTERNAL MEDICINE

## 2021-04-09 PROCEDURE — 93306 ECHO (CUPID ONLY): ICD-10-PCS | Mod: 26,,, | Performed by: INTERNAL MEDICINE

## 2021-04-09 PROCEDURE — 93306 TTE W/DOPPLER COMPLETE: CPT

## 2021-04-12 ENCOUNTER — TELEPHONE (OUTPATIENT)
Dept: INTERNAL MEDICINE | Facility: CLINIC | Age: 76
End: 2021-04-12

## 2021-04-13 ENCOUNTER — TELEPHONE (OUTPATIENT)
Dept: INTERNAL MEDICINE | Facility: CLINIC | Age: 76
End: 2021-04-13

## 2021-04-14 ENCOUNTER — TELEPHONE (OUTPATIENT)
Dept: INTERNAL MEDICINE | Facility: CLINIC | Age: 76
End: 2021-04-14
Payer: MEDICARE

## 2021-04-14 ENCOUNTER — TELEPHONE (OUTPATIENT)
Dept: ADMINISTRATIVE | Facility: CLINIC | Age: 76
End: 2021-04-14

## 2021-04-16 ENCOUNTER — OFFICE VISIT (OUTPATIENT)
Dept: PAIN MEDICINE | Facility: CLINIC | Age: 76
End: 2021-04-16
Payer: MEDICARE

## 2021-04-16 ENCOUNTER — TELEPHONE (OUTPATIENT)
Dept: INTERNAL MEDICINE | Facility: CLINIC | Age: 76
End: 2021-04-16
Payer: MEDICARE

## 2021-04-16 VITALS
WEIGHT: 207.88 LBS | BODY MASS INDEX: 35.68 KG/M2 | DIASTOLIC BLOOD PRESSURE: 70 MMHG | SYSTOLIC BLOOD PRESSURE: 154 MMHG

## 2021-04-16 DIAGNOSIS — G89.29 CHRONIC BILATERAL LOW BACK PAIN WITHOUT SCIATICA: Primary | ICD-10-CM

## 2021-04-16 DIAGNOSIS — M54.50 CHRONIC BILATERAL LOW BACK PAIN WITHOUT SCIATICA: Primary | ICD-10-CM

## 2021-04-16 DIAGNOSIS — M47.816 LUMBAR SPONDYLOSIS: ICD-10-CM

## 2021-04-16 PROCEDURE — 99204 PR OFFICE/OUTPT VISIT, NEW, LEVL IV, 45-59 MIN: ICD-10-PCS | Mod: S$PBB,,, | Performed by: PAIN MEDICINE

## 2021-04-16 PROCEDURE — 99204 OFFICE O/P NEW MOD 45 MIN: CPT | Mod: S$PBB,,, | Performed by: PAIN MEDICINE

## 2021-04-16 PROCEDURE — 99215 OFFICE O/P EST HI 40 MIN: CPT | Mod: PBBFAC,PN | Performed by: PAIN MEDICINE

## 2021-04-16 PROCEDURE — 99999 PR PBB SHADOW E&M-EST. PATIENT-LVL V: CPT | Mod: PBBFAC,,, | Performed by: PAIN MEDICINE

## 2021-04-16 PROCEDURE — 99999 PR PBB SHADOW E&M-EST. PATIENT-LVL V: ICD-10-PCS | Mod: PBBFAC,,, | Performed by: PAIN MEDICINE

## 2021-04-19 ENCOUNTER — TELEPHONE (OUTPATIENT)
Dept: PAIN MEDICINE | Facility: CLINIC | Age: 76
End: 2021-04-19

## 2021-04-20 ENCOUNTER — TELEPHONE (OUTPATIENT)
Dept: PAIN MEDICINE | Facility: CLINIC | Age: 76
End: 2021-04-20

## 2021-04-20 ENCOUNTER — TELEPHONE (OUTPATIENT)
Dept: INTERNAL MEDICINE | Facility: CLINIC | Age: 76
End: 2021-04-20
Payer: MEDICARE

## 2021-04-20 DIAGNOSIS — M47.816 LUMBAR SPONDYLOSIS: Primary | ICD-10-CM

## 2021-04-23 RX ORDER — FUROSEMIDE 40 MG/1
40 TABLET ORAL DAILY PRN
Qty: 10 TABLET | Refills: 0 | Status: SHIPPED | OUTPATIENT
Start: 2021-04-23

## 2021-04-30 ENCOUNTER — EXTERNAL CHRONIC CARE MANAGEMENT (OUTPATIENT)
Dept: PRIMARY CARE CLINIC | Facility: CLINIC | Age: 76
End: 2021-04-30
Payer: MEDICARE

## 2021-04-30 PROCEDURE — 99490 CHRNC CARE MGMT STAFF 1ST 20: CPT | Mod: S$PBB,,, | Performed by: INTERNAL MEDICINE

## 2021-04-30 PROCEDURE — 99490 CHRNC CARE MGMT STAFF 1ST 20: CPT | Mod: PBBFAC,PO | Performed by: INTERNAL MEDICINE

## 2021-04-30 PROCEDURE — 99490 PR CHRONIC CARE MGMT, 1ST 20 MIN: ICD-10-PCS | Mod: S$PBB,,, | Performed by: INTERNAL MEDICINE

## 2021-05-04 ENCOUNTER — HOSPITAL ENCOUNTER (OUTPATIENT)
Facility: HOSPITAL | Age: 76
Discharge: HOME | End: 2021-05-04
Attending: PAIN MEDICINE | Admitting: PAIN MEDICINE
Payer: MEDICARE

## 2021-05-04 VITALS
SYSTOLIC BLOOD PRESSURE: 155 MMHG | DIASTOLIC BLOOD PRESSURE: 70 MMHG | TEMPERATURE: 97 F | HEART RATE: 101 BPM | BODY MASS INDEX: 34.49 KG/M2 | OXYGEN SATURATION: 96 % | HEIGHT: 64 IN | WEIGHT: 202 LBS | RESPIRATION RATE: 16 BRPM

## 2021-05-04 DIAGNOSIS — G89.29 CHRONIC PAIN: ICD-10-CM

## 2021-05-04 DIAGNOSIS — M47.816 LUMBAR SPONDYLOSIS: ICD-10-CM

## 2021-05-04 DIAGNOSIS — I48.91 ATRIAL FIBRILLATION, UNSPECIFIED TYPE: Primary | ICD-10-CM

## 2021-05-04 PROCEDURE — 25000003 PHARM REV CODE 250: Performed by: PAIN MEDICINE

## 2021-05-04 PROCEDURE — 99152 MOD SED SAME PHYS/QHP 5/>YRS: CPT | Performed by: PAIN MEDICINE

## 2021-05-04 RX ORDER — SODIUM CHLORIDE 9 MG/ML
INJECTION, SOLUTION INTRAVENOUS CONTINUOUS
Status: ACTIVE | OUTPATIENT
Start: 2021-05-04

## 2021-05-04 RX ORDER — LIDOCAINE HYDROCHLORIDE 10 MG/ML
1 INJECTION, SOLUTION EPIDURAL; INFILTRATION; INTRACAUDAL; PERINEURAL ONCE
Status: ACTIVE | OUTPATIENT
Start: 2021-05-04

## 2021-05-04 RX ORDER — SODIUM BICARBONATE 1 MEQ/ML
SYRINGE (ML) INTRAVENOUS
Status: DISCONTINUED | OUTPATIENT
Start: 2021-05-04 | End: 2021-05-04 | Stop reason: HOSPADM

## 2021-05-04 RX ADMIN — SODIUM CHLORIDE: 0.9 INJECTION, SOLUTION INTRAVENOUS at 10:05

## 2021-05-05 ENCOUNTER — PATIENT OUTREACH (OUTPATIENT)
Dept: ADMINISTRATIVE | Facility: OTHER | Age: 76
End: 2021-05-05

## 2021-05-11 ENCOUNTER — OFFICE VISIT (OUTPATIENT)
Dept: CARDIOLOGY | Facility: CLINIC | Age: 76
End: 2021-05-11
Payer: MEDICARE

## 2021-05-11 VITALS
DIASTOLIC BLOOD PRESSURE: 72 MMHG | BODY MASS INDEX: 34.28 KG/M2 | SYSTOLIC BLOOD PRESSURE: 152 MMHG | HEART RATE: 80 BPM | HEIGHT: 64 IN | WEIGHT: 200.81 LBS

## 2021-05-11 DIAGNOSIS — N18.31 STAGE 3A CHRONIC KIDNEY DISEASE: ICD-10-CM

## 2021-05-11 DIAGNOSIS — D50.0 IRON DEFICIENCY ANEMIA DUE TO CHRONIC BLOOD LOSS: ICD-10-CM

## 2021-05-11 DIAGNOSIS — E78.00 PURE HYPERCHOLESTEROLEMIA: ICD-10-CM

## 2021-05-11 DIAGNOSIS — M47.816 LUMBAR SPONDYLOSIS: ICD-10-CM

## 2021-05-11 DIAGNOSIS — I10 ESSENTIAL HYPERTENSION: ICD-10-CM
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
## 2021-05-11 DIAGNOSIS — I48.0 PAROXYSMAL ATRIAL FIBRILLATION: Primary | ICD-10-CM
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
## 2021-05-11 PROBLEM — R29.6 FALLS: Status: ACTIVE | Noted: 2021-05-11

## 2021-05-11 PROCEDURE — 99215 OFFICE O/P EST HI 40 MIN: CPT | Mod: PBBFAC,PO,25 | Performed by: INTERNAL MEDICINE

## 2021-05-11 PROCEDURE — 93010 EKG 12-LEAD: ICD-10-PCS | Mod: S$PBB,,, | Performed by: INTERNAL MEDICINE

## 2021-05-11 PROCEDURE — 99203 OFFICE O/P NEW LOW 30 MIN: CPT | Mod: S$PBB,,, | Performed by: INTERNAL MEDICINE

## 2021-05-11 PROCEDURE — 93005 ELECTROCARDIOGRAM TRACING: CPT | Mod: PBBFAC,PO | Performed by: INTERNAL MEDICINE

## 2021-05-11 PROCEDURE — 99999 PR PBB SHADOW E&M-EST. PATIENT-LVL V: ICD-10-PCS | Mod: PBBFAC,,, | Performed by: INTERNAL MEDICINE

## 2021-05-11 PROCEDURE — 99203 PR OFFICE/OUTPT VISIT, NEW, LEVL III, 30-44 MIN: ICD-10-PCS | Mod: S$PBB,,, | Performed by: INTERNAL MEDICINE

## 2021-05-11 PROCEDURE — 93010 ELECTROCARDIOGRAM REPORT: CPT | Mod: S$PBB,,, | Performed by: INTERNAL MEDICINE

## 2021-05-11 PROCEDURE — 99999 PR PBB SHADOW E&M-EST. PATIENT-LVL V: CPT | Mod: PBBFAC,,, | Performed by: INTERNAL MEDICINE

## 2021-05-11 RX ORDER — METOPROLOL SUCCINATE 25 MG/1
25 TABLET, EXTENDED RELEASE ORAL DAILY
Qty: 30 TABLET | Refills: 11 | Status: SHIPPED | OUTPATIENT
Start: 2021-05-11 | End: 2021-09-07 | Stop reason: SDUPTHER

## 2021-05-12 ENCOUNTER — PES CALL (OUTPATIENT)
Dept: ADMINISTRATIVE | Facility: CLINIC | Age: 76
End: 2021-05-12

## 2021-05-13 ENCOUNTER — LAB VISIT (OUTPATIENT)
Dept: LAB | Facility: HOSPITAL | Age: 76
End: 2021-05-13
Attending: INTERNAL MEDICINE
Payer: MEDICARE

## 2021-05-13 DIAGNOSIS — D50.9 IRON DEFICIENCY ANEMIA, UNSPECIFIED IRON DEFICIENCY ANEMIA TYPE: ICD-10-CM

## 2021-05-13 DIAGNOSIS — I10 ESSENTIAL HYPERTENSION: ICD-10-CM

## 2021-05-13 DIAGNOSIS — E11.9 DM TYPE 2 WITHOUT RETINOPATHY: ICD-10-CM

## 2021-05-13 DIAGNOSIS — N18.32 STAGE 3B CHRONIC KIDNEY DISEASE: ICD-10-CM

## 2021-05-13 LAB
BASOPHILS # BLD AUTO: 0.02 K/UL (ref 0–0.2)
BASOPHILS NFR BLD: 0.3 % (ref 0–1.9)
DIFFERENTIAL METHOD: NORMAL
EOSINOPHIL # BLD AUTO: 0.2 K/UL (ref 0–0.5)
EOSINOPHIL NFR BLD: 3.2 % (ref 0–8)
ERYTHROCYTE [DISTWIDTH] IN BLOOD BY AUTOMATED COUNT: 14.4 % (ref 11.5–14.5)
ESTIMATED AVG GLUCOSE: 171 MG/DL (ref 68–131)
HBA1C MFR BLD: 7.6 % (ref 4–5.6)
HCT VFR BLD AUTO: 38.7 % (ref 37–48.5)
HGB BLD-MCNC: 12.8 G/DL (ref 12–16)
IMM GRANULOCYTES # BLD AUTO: 0.02 K/UL (ref 0–0.04)
IMM GRANULOCYTES NFR BLD AUTO: 0.3 % (ref 0–0.5)
LYMPHOCYTES # BLD AUTO: 2.4 K/UL (ref 1–4.8)
LYMPHOCYTES NFR BLD: 34.8 % (ref 18–48)
MCH RBC QN AUTO: 28.8 PG (ref 27–31)
MCHC RBC AUTO-ENTMCNC: 33.1 G/DL (ref 32–36)
MCV RBC AUTO: 87 FL (ref 82–98)
MONOCYTES # BLD AUTO: 0.5 K/UL (ref 0.3–1)
MONOCYTES NFR BLD: 7.3 % (ref 4–15)
NEUTROPHILS # BLD AUTO: 3.7 K/UL (ref 1.8–7.7)
NEUTROPHILS NFR BLD: 54.1 % (ref 38–73)
NRBC BLD-RTO: 0 /100 WBC
PLATELET # BLD AUTO: 257 K/UL (ref 150–450)
PMV BLD AUTO: 11.9 FL (ref 9.2–12.9)
RBC # BLD AUTO: 4.44 M/UL (ref 4–5.4)
WBC # BLD AUTO: 6.81 K/UL (ref 3.9–12.7)

## 2021-05-13 PROCEDURE — 36415 COLL VENOUS BLD VENIPUNCTURE: CPT | Mod: PN | Performed by: INTERNAL MEDICINE

## 2021-05-13 PROCEDURE — 82728 ASSAY OF FERRITIN: CPT | Performed by: INTERNAL MEDICINE

## 2021-05-13 PROCEDURE — 84443 ASSAY THYROID STIM HORMONE: CPT | Performed by: INTERNAL MEDICINE

## 2021-05-13 PROCEDURE — 85025 COMPLETE CBC W/AUTO DIFF WBC: CPT | Performed by: INTERNAL MEDICINE

## 2021-05-13 PROCEDURE — 80053 COMPREHEN METABOLIC PANEL: CPT | Performed by: INTERNAL MEDICINE

## 2021-05-13 PROCEDURE — 80061 LIPID PANEL: CPT | Performed by: INTERNAL MEDICINE

## 2021-05-13 PROCEDURE — 83036 HEMOGLOBIN GLYCOSYLATED A1C: CPT | Performed by: INTERNAL MEDICINE

## 2021-05-13 PROCEDURE — 83540 ASSAY OF IRON: CPT | Performed by: INTERNAL MEDICINE

## 2021-05-14 ENCOUNTER — TELEPHONE (OUTPATIENT)
Dept: CARDIOLOGY | Facility: CLINIC | Age: 76
End: 2021-05-14

## 2021-05-14 DIAGNOSIS — N18.32 STAGE 3B CHRONIC KIDNEY DISEASE: ICD-10-CM

## 2021-05-14 DIAGNOSIS — I48.0 PAROXYSMAL ATRIAL FIBRILLATION: Primary | ICD-10-CM

## 2021-05-14 DIAGNOSIS — I70.0 AORTIC ATHEROSCLEROSIS: ICD-10-CM

## 2021-05-14 LAB
ALBUMIN SERPL BCP-MCNC: 3.8 G/DL (ref 3.5–5.2)
ALP SERPL-CCNC: 54 U/L (ref 55–135)
ALT SERPL W/O P-5'-P-CCNC: 43 U/L (ref 10–44)
ANION GAP SERPL CALC-SCNC: 11 MMOL/L (ref 8–16)
AST SERPL-CCNC: 61 U/L (ref 10–40)
BILIRUB SERPL-MCNC: 0.6 MG/DL (ref 0.1–1)
BUN SERPL-MCNC: 14 MG/DL (ref 8–23)
CALCIUM SERPL-MCNC: 9.5 MG/DL (ref 8.7–10.5)
CHLORIDE SERPL-SCNC: 101 MMOL/L (ref 95–110)
CHOLEST SERPL-MCNC: 161 MG/DL (ref 120–199)
CHOLEST/HDLC SERPL: 3.6 {RATIO} (ref 2–5)
CO2 SERPL-SCNC: 30 MMOL/L (ref 23–29)
CREAT SERPL-MCNC: 1.8 MG/DL (ref 0.5–1.4)
EST. GFR  (AFRICAN AMERICAN): 31.1 ML/MIN/1.73 M^2
EST. GFR  (NON AFRICAN AMERICAN): 27 ML/MIN/1.73 M^2
FERRITIN SERPL-MCNC: 25 NG/ML (ref 20–300)
GLUCOSE SERPL-MCNC: 203 MG/DL (ref 70–110)
HDLC SERPL-MCNC: 45 MG/DL (ref 40–75)
HDLC SERPL: 28 % (ref 20–50)
IRON SERPL-MCNC: 112 UG/DL (ref 30–160)
LDLC SERPL CALC-MCNC: 79.4 MG/DL (ref 63–159)
NONHDLC SERPL-MCNC: 116 MG/DL
POTASSIUM SERPL-SCNC: 3.7 MMOL/L (ref 3.5–5.1)
PROT SERPL-MCNC: 7.5 G/DL (ref 6–8.4)
SATURATED IRON: 25 % (ref 20–50)
SODIUM SERPL-SCNC: 142 MMOL/L (ref 136–145)
TOTAL IRON BINDING CAPACITY: 457 UG/DL (ref 250–450)
TRANSFERRIN SERPL-MCNC: 309 MG/DL (ref 200–375)
TRIGL SERPL-MCNC: 183 MG/DL (ref 30–150)
TSH SERPL DL<=0.005 MIU/L-ACNC: 1.62 UIU/ML (ref 0.4–4)

## 2021-05-17 ENCOUNTER — TELEPHONE (OUTPATIENT)
Dept: CARDIOLOGY | Facility: CLINIC | Age: 76
End: 2021-05-17

## 2021-05-19 ENCOUNTER — TELEPHONE (OUTPATIENT)
Dept: PAIN MEDICINE | Facility: CLINIC | Age: 76
End: 2021-05-19

## 2021-05-19 ENCOUNTER — TELEPHONE (OUTPATIENT)
Dept: CARDIOLOGY | Facility: CLINIC | Age: 76
End: 2021-05-19

## 2021-05-19 ENCOUNTER — PATIENT MESSAGE (OUTPATIENT)
Dept: PAIN MEDICINE | Facility: CLINIC | Age: 76
End: 2021-05-19

## 2021-05-19 DIAGNOSIS — M47.816 LUMBAR SPONDYLOSIS: Primary | ICD-10-CM

## 2021-05-20 ENCOUNTER — PATIENT MESSAGE (OUTPATIENT)
Dept: ADMINISTRATIVE | Facility: HOSPITAL | Age: 76
End: 2021-05-20

## 2021-05-20 ENCOUNTER — PATIENT OUTREACH (OUTPATIENT)
Dept: ADMINISTRATIVE | Facility: HOSPITAL | Age: 76
End: 2021-05-20

## 2021-05-26 ENCOUNTER — TELEPHONE (OUTPATIENT)
Dept: PAIN MEDICINE | Facility: CLINIC | Age: 76
End: 2021-05-26

## 2021-05-27 ENCOUNTER — HOSPITAL ENCOUNTER (OUTPATIENT)
Facility: HOSPITAL | Age: 76
Discharge: HOME | End: 2021-05-27
Attending: PAIN MEDICINE | Admitting: PAIN MEDICINE
Payer: MEDICARE

## 2021-05-27 ENCOUNTER — TELEPHONE (OUTPATIENT)
Dept: INTERNAL MEDICINE | Facility: CLINIC | Age: 76
End: 2021-05-27

## 2021-05-27 VITALS
OXYGEN SATURATION: 95 % | SYSTOLIC BLOOD PRESSURE: 161 MMHG | BODY MASS INDEX: 34.15 KG/M2 | DIASTOLIC BLOOD PRESSURE: 66 MMHG | HEIGHT: 64 IN | WEIGHT: 200 LBS | RESPIRATION RATE: 16 BRPM | TEMPERATURE: 97 F | HEART RATE: 69 BPM

## 2021-05-27 DIAGNOSIS — M47.816 LUMBAR SPONDYLOSIS: Primary | ICD-10-CM

## 2021-05-27 DIAGNOSIS — G89.29 CHRONIC PAIN: ICD-10-CM

## 2021-05-27 LAB — POCT GLUCOSE: 217 MG/DL (ref 70–110)

## 2021-05-27 PROCEDURE — 99152 MOD SED SAME PHYS/QHP 5/>YRS: CPT | Performed by: PAIN MEDICINE

## 2021-05-27 PROCEDURE — 64493 INJ PARAVERT F JNT L/S 1 LEV: CPT | Mod: 50,,, | Performed by: PAIN MEDICINE

## 2021-05-27 PROCEDURE — 25000003 PHARM REV CODE 250: Performed by: PAIN MEDICINE

## 2021-05-27 PROCEDURE — 64493 INJ PARAVERT F JNT L/S 1 LEV: CPT | Mod: 50 | Performed by: PAIN MEDICINE

## 2021-05-27 PROCEDURE — 64494 INJ PARAVERT F JNT L/S 2 LEV: CPT | Mod: 50 | Performed by: PAIN MEDICINE

## 2021-05-27 PROCEDURE — 64494 PR INJ DX/THER AGNT PARAVERT FACET JOINT,IMG GUIDE,LUMBAR/SAC, 2ND LEVEL: ICD-10-PCS | Mod: 50,,, | Performed by: PAIN MEDICINE

## 2021-05-27 PROCEDURE — 25500020 PHARM REV CODE 255: Performed by: PAIN MEDICINE

## 2021-05-27 PROCEDURE — 64493 PR INJ DX/THER AGNT PARAVERT FACET JOINT,IMG GUIDE,LUMBAR/SAC,1ST LVL: ICD-10-PCS | Mod: 50,,, | Performed by: PAIN MEDICINE

## 2021-05-27 PROCEDURE — 64494 INJ PARAVERT F JNT L/S 2 LEV: CPT | Mod: 50,,, | Performed by: PAIN MEDICINE

## 2021-05-27 PROCEDURE — 63600175 PHARM REV CODE 636 W HCPCS: Performed by: PAIN MEDICINE

## 2021-05-27 RX ORDER — BUPIVACAINE HYDROCHLORIDE 2.5 MG/ML
INJECTION, SOLUTION EPIDURAL; INFILTRATION; INTRACAUDAL
Status: DISCONTINUED | OUTPATIENT
Start: 2021-05-27 | End: 2021-05-27 | Stop reason: HOSPADM

## 2021-05-27 RX ORDER — FENTANYL CITRATE 50 UG/ML
INJECTION, SOLUTION INTRAMUSCULAR; INTRAVENOUS
Status: DISCONTINUED | OUTPATIENT
Start: 2021-05-27 | End: 2021-05-27 | Stop reason: HOSPADM

## 2021-05-27 RX ORDER — LIDOCAINE HYDROCHLORIDE 10 MG/ML
1 INJECTION, SOLUTION EPIDURAL; INFILTRATION; INTRACAUDAL; PERINEURAL ONCE
Status: ACTIVE | OUTPATIENT
Start: 2021-05-27

## 2021-05-27 RX ORDER — LIDOCAINE HYDROCHLORIDE 10 MG/ML
INJECTION INFILTRATION; PERINEURAL
Status: DISCONTINUED | OUTPATIENT
Start: 2021-05-27 | End: 2021-05-27 | Stop reason: HOSPADM

## 2021-05-27 RX ORDER — MIDAZOLAM HYDROCHLORIDE 1 MG/ML
INJECTION, SOLUTION INTRAMUSCULAR; INTRAVENOUS
Status: DISCONTINUED | OUTPATIENT
Start: 2021-05-27 | End: 2021-05-27 | Stop reason: HOSPADM

## 2021-05-27 RX ORDER — SODIUM BICARBONATE 1 MEQ/ML
SYRINGE (ML) INTRAVENOUS
Status: DISCONTINUED | OUTPATIENT
Start: 2021-05-27 | End: 2021-05-27 | Stop reason: HOSPADM

## 2021-05-27 RX ORDER — SODIUM CHLORIDE 9 MG/ML
INJECTION, SOLUTION INTRAVENOUS CONTINUOUS
Status: ACTIVE | OUTPATIENT
Start: 2021-05-27

## 2021-05-27 RX ADMIN — SODIUM CHLORIDE: 0.9 INJECTION, SOLUTION INTRAVENOUS at 11:05

## 2021-05-28 ENCOUNTER — TELEPHONE (OUTPATIENT)
Dept: PAIN MEDICINE | Facility: CLINIC | Age: 76
End: 2021-05-28

## 2021-05-28 ENCOUNTER — TELEPHONE (OUTPATIENT)
Dept: ELECTROPHYSIOLOGY | Facility: CLINIC | Age: 76
End: 2021-05-28

## 2021-05-31 ENCOUNTER — OFFICE VISIT (OUTPATIENT)
Dept: INTERNAL MEDICINE | Facility: CLINIC | Age: 76
End: 2021-05-31
Payer: MEDICARE

## 2021-05-31 ENCOUNTER — TELEPHONE (OUTPATIENT)
Dept: PAIN MEDICINE | Facility: CLINIC | Age: 76
End: 2021-05-31

## 2021-05-31 ENCOUNTER — EXTERNAL CHRONIC CARE MANAGEMENT (OUTPATIENT)
Dept: PRIMARY CARE CLINIC | Facility: CLINIC | Age: 76
End: 2021-05-31
Payer: MEDICARE

## 2021-05-31 DIAGNOSIS — E11.9 DM TYPE 2 WITHOUT RETINOPATHY: Primary | ICD-10-CM

## 2021-05-31 DIAGNOSIS — R53.81 DEBILITY: ICD-10-CM

## 2021-05-31 PROCEDURE — 99442 PR PHYSICIAN TELEPHONE EVALUATION 11-20 MIN: ICD-10-PCS | Mod: 95,,, | Performed by: INTERNAL MEDICINE

## 2021-05-31 PROCEDURE — 99487 CPLX CHRNC CARE 1ST 60 MIN: CPT | Mod: S$PBB,,, | Performed by: INTERNAL MEDICINE

## 2021-05-31 PROCEDURE — 99442 PR PHYSICIAN TELEPHONE EVALUATION 11-20 MIN: CPT | Mod: 95,,, | Performed by: INTERNAL MEDICINE

## 2021-05-31 PROCEDURE — 99487 CPLX CHRNC CARE 1ST 60 MIN: CPT | Mod: PBBFAC,PO | Performed by: INTERNAL MEDICINE

## 2021-05-31 PROCEDURE — 99489 CPLX CHRNC CARE EA ADDL 30: CPT | Mod: S$PBB,,, | Performed by: INTERNAL MEDICINE

## 2021-05-31 PROCEDURE — 99489 PR COMPLX CHRON CARE MGMT, EA ADDTL 30 MIN, PER MONTH: ICD-10-PCS | Mod: S$PBB,,, | Performed by: INTERNAL MEDICINE

## 2021-05-31 PROCEDURE — 99487 PR COMPLX CHRON CARE MGMT, 1ST HR, PER MONTH: ICD-10-PCS | Mod: S$PBB,,, | Performed by: INTERNAL MEDICINE

## 2021-05-31 PROCEDURE — 99489 CPLX CHRNC CARE EA ADDL 30: CPT | Mod: PBBFAC,PO | Performed by: INTERNAL MEDICINE

## 2021-06-03 ENCOUNTER — TELEPHONE (OUTPATIENT)
Dept: CARDIOLOGY | Facility: CLINIC | Age: 76
End: 2021-06-03

## 2021-06-04 ENCOUNTER — TELEPHONE (OUTPATIENT)
Dept: PAIN MEDICINE | Facility: CLINIC | Age: 76
End: 2021-06-04

## 2021-06-04 ENCOUNTER — PATIENT MESSAGE (OUTPATIENT)
Dept: PAIN MEDICINE | Facility: CLINIC | Age: 76
End: 2021-06-04

## 2021-06-04 DIAGNOSIS — M47.816 LUMBAR SPONDYLOSIS: Primary | ICD-10-CM

## 2021-06-07 ENCOUNTER — TELEPHONE (OUTPATIENT)
Dept: INTERNAL MEDICINE | Facility: CLINIC | Age: 76
End: 2021-06-07

## 2021-06-07 DIAGNOSIS — I10 ESSENTIAL HYPERTENSION: ICD-10-CM

## 2021-06-07 RX ORDER — LISINOPRIL 40 MG/1
TABLET ORAL
Qty: 90 TABLET | Refills: 3 | Status: SHIPPED | OUTPATIENT
Start: 2021-06-07 | End: 2021-07-16

## 2021-06-09 ENCOUNTER — TELEPHONE (OUTPATIENT)
Dept: PAIN MEDICINE | Facility: CLINIC | Age: 76
End: 2021-06-09

## 2021-06-10 ENCOUNTER — HOSPITAL ENCOUNTER (OUTPATIENT)
Facility: HOSPITAL | Age: 76
Discharge: HOME | End: 2021-06-10
Attending: PAIN MEDICINE | Admitting: PAIN MEDICINE
Payer: MEDICARE

## 2021-06-10 VITALS
BODY MASS INDEX: 34.49 KG/M2 | DIASTOLIC BLOOD PRESSURE: 75 MMHG | WEIGHT: 202 LBS | SYSTOLIC BLOOD PRESSURE: 166 MMHG | RESPIRATION RATE: 16 BRPM | OXYGEN SATURATION: 98 % | HEART RATE: 69 BPM | TEMPERATURE: 98 F | HEIGHT: 64 IN

## 2021-06-10 DIAGNOSIS — M47.816 LUMBAR SPONDYLOSIS: Primary | ICD-10-CM

## 2021-06-10 LAB — POCT GLUCOSE: 194 MG/DL (ref 70–110)

## 2021-06-10 PROCEDURE — 25000003 PHARM REV CODE 250: Performed by: PAIN MEDICINE

## 2021-06-10 PROCEDURE — 64493 INJ PARAVERT F JNT L/S 1 LEV: CPT | Mod: 50,,, | Performed by: PAIN MEDICINE

## 2021-06-10 PROCEDURE — 64494 PR INJ DX/THER AGNT PARAVERT FACET JOINT,IMG GUIDE,LUMBAR/SAC, 2ND LEVEL: ICD-10-PCS | Mod: 50,,, | Performed by: PAIN MEDICINE

## 2021-06-10 PROCEDURE — 64493 INJ PARAVERT F JNT L/S 1 LEV: CPT | Mod: 50 | Performed by: PAIN MEDICINE

## 2021-06-10 PROCEDURE — 64494 INJ PARAVERT F JNT L/S 2 LEV: CPT | Mod: 50 | Performed by: PAIN MEDICINE

## 2021-06-10 PROCEDURE — 25500020 PHARM REV CODE 255: Performed by: PAIN MEDICINE

## 2021-06-10 PROCEDURE — 64493 PR INJ DX/THER AGNT PARAVERT FACET JOINT,IMG GUIDE,LUMBAR/SAC,1ST LVL: ICD-10-PCS | Mod: 50,,, | Performed by: PAIN MEDICINE

## 2021-06-10 PROCEDURE — 64494 INJ PARAVERT F JNT L/S 2 LEV: CPT | Mod: 50,,, | Performed by: PAIN MEDICINE

## 2021-06-10 RX ORDER — SODIUM BICARBONATE 1 MEQ/ML
SYRINGE (ML) INTRAVENOUS
Status: DISCONTINUED | OUTPATIENT
Start: 2021-06-10 | End: 2021-06-10 | Stop reason: HOSPADM

## 2021-06-10 RX ORDER — LIDOCAINE HYDROCHLORIDE 10 MG/ML
INJECTION INFILTRATION; PERINEURAL
Status: DISCONTINUED | OUTPATIENT
Start: 2021-06-10 | End: 2021-06-10 | Stop reason: HOSPADM

## 2021-06-10 RX ORDER — ALPRAZOLAM 0.5 MG/1
0.5 TABLET, ORALLY DISINTEGRATING ORAL ONCE AS NEEDED
Status: COMPLETED | OUTPATIENT
Start: 2021-06-10 | End: 2021-06-10

## 2021-06-10 RX ORDER — BUPIVACAINE HYDROCHLORIDE 2.5 MG/ML
INJECTION, SOLUTION EPIDURAL; INFILTRATION; INTRACAUDAL
Status: DISCONTINUED | OUTPATIENT
Start: 2021-06-10 | End: 2021-06-10 | Stop reason: HOSPADM

## 2021-06-10 RX ADMIN — ALPRAZOLAM 0.5 MG: 0.5 TABLET, ORALLY DISINTEGRATING ORAL at 11:06

## 2021-06-11 ENCOUNTER — TELEPHONE (OUTPATIENT)
Dept: RESEARCH | Facility: HOSPITAL | Age: 76
End: 2021-06-11

## 2021-06-11 PROCEDURE — G0180 MD CERTIFICATION HHA PATIENT: HCPCS | Mod: ,,, | Performed by: INTERNAL MEDICINE

## 2021-06-11 PROCEDURE — G0180 PR HOME HEALTH MD CERTIFICATION: ICD-10-PCS | Mod: ,,, | Performed by: INTERNAL MEDICINE

## 2021-06-14 ENCOUNTER — PATIENT OUTREACH (OUTPATIENT)
Dept: ADMINISTRATIVE | Facility: OTHER | Age: 76
End: 2021-06-14

## 2021-06-15 ENCOUNTER — RESEARCH ENCOUNTER (OUTPATIENT)
Dept: RESEARCH | Facility: HOSPITAL | Age: 76
End: 2021-06-15

## 2021-06-16 PROBLEM — I48.19 PERSISTENT ATRIAL FIBRILLATION: Status: ACTIVE | Noted: 2021-05-11

## 2021-06-17 ENCOUNTER — RESEARCH ENCOUNTER (OUTPATIENT)
Dept: RESEARCH | Facility: HOSPITAL | Age: 76
End: 2021-06-17

## 2021-06-17 ENCOUNTER — HOSPITAL ENCOUNTER (OUTPATIENT)
Dept: CARDIOLOGY | Facility: CLINIC | Age: 76
Discharge: HOME | End: 2021-06-17
Payer: MEDICARE

## 2021-06-17 ENCOUNTER — OFFICE VISIT (OUTPATIENT)
Dept: ELECTROPHYSIOLOGY | Facility: CLINIC | Age: 76
End: 2021-06-17
Payer: MEDICARE

## 2021-06-17 VITALS
WEIGHT: 198.19 LBS | SYSTOLIC BLOOD PRESSURE: 160 MMHG | HEIGHT: 64 IN | BODY MASS INDEX: 33.84 KG/M2 | DIASTOLIC BLOOD PRESSURE: 69 MMHG | HEART RATE: 65 BPM

## 2021-06-17 DIAGNOSIS — E11.9 CONTROLLED TYPE 2 DIABETES MELLITUS WITHOUT COMPLICATION, WITHOUT LONG-TERM CURRENT USE OF INSULIN: ICD-10-CM

## 2021-06-17 DIAGNOSIS — I70.0 AORTIC ATHEROSCLEROSIS: ICD-10-CM

## 2021-06-17 DIAGNOSIS — N18.32 STAGE 3B CHRONIC KIDNEY DISEASE: ICD-10-CM

## 2021-06-17 DIAGNOSIS — I49.8 OTHER SPECIFIED CARDIAC ARRHYTHMIAS: Primary | ICD-10-CM

## 2021-06-17 DIAGNOSIS — I49.8 OTHER SPECIFIED CARDIAC ARRHYTHMIAS: ICD-10-CM

## 2021-06-17 DIAGNOSIS — I10 ESSENTIAL HYPERTENSION: ICD-10-CM

## 2021-06-17 DIAGNOSIS — I48.0 PAROXYSMAL ATRIAL FIBRILLATION: Primary | ICD-10-CM

## 2021-06-17 PROCEDURE — 99214 OFFICE O/P EST MOD 30 MIN: CPT | Mod: PBBFAC,25 | Performed by: INTERNAL MEDICINE

## 2021-06-17 PROCEDURE — 93010 RHYTHM STRIP: ICD-10-PCS | Mod: S$PBB,,, | Performed by: INTERNAL MEDICINE

## 2021-06-17 PROCEDURE — 93005 ELECTROCARDIOGRAM TRACING: CPT | Mod: PBBFAC | Performed by: INTERNAL MEDICINE

## 2021-06-17 PROCEDURE — 93010 ELECTROCARDIOGRAM REPORT: CPT | Mod: S$PBB,,, | Performed by: INTERNAL MEDICINE

## 2021-06-17 PROCEDURE — 99205 PR OFFICE/OUTPT VISIT, NEW, LEVL V, 60-74 MIN: ICD-10-PCS | Mod: S$PBB,,, | Performed by: INTERNAL MEDICINE

## 2021-06-17 PROCEDURE — 99999 PR PBB SHADOW E&M-EST. PATIENT-LVL IV: CPT | Mod: PBBFAC,,, | Performed by: INTERNAL MEDICINE

## 2021-06-17 PROCEDURE — 99205 OFFICE O/P NEW HI 60 MIN: CPT | Mod: S$PBB,,, | Performed by: INTERNAL MEDICINE

## 2021-06-17 PROCEDURE — 99999 PR PBB SHADOW E&M-EST. PATIENT-LVL IV: ICD-10-PCS | Mod: PBBFAC,,, | Performed by: INTERNAL MEDICINE

## 2021-06-21 ENCOUNTER — PATIENT MESSAGE (OUTPATIENT)
Dept: INTERNAL MEDICINE | Facility: CLINIC | Age: 76
End: 2021-06-21

## 2021-06-22 ENCOUNTER — PATIENT MESSAGE (OUTPATIENT)
Dept: INTERNAL MEDICINE | Facility: CLINIC | Age: 76
End: 2021-06-22

## 2021-06-24 ENCOUNTER — EXTERNAL HOME HEALTH (OUTPATIENT)
Dept: HOME HEALTH SERVICES | Facility: HOSPITAL | Age: 76
End: 2021-06-24
Payer: MEDICARE

## 2021-06-25 ENCOUNTER — TELEPHONE (OUTPATIENT)
Dept: NEUROSURGERY | Facility: CLINIC | Age: 76
End: 2021-06-25

## 2021-06-26 RX ORDER — GABAPENTIN 300 MG/1
CAPSULE ORAL
Qty: 270 CAPSULE | Refills: 0 | Status: SHIPPED | OUTPATIENT
Start: 2021-06-26 | End: 2021-10-04 | Stop reason: SDUPTHER

## 2021-06-30 ENCOUNTER — EXTERNAL CHRONIC CARE MANAGEMENT (OUTPATIENT)
Dept: PRIMARY CARE CLINIC | Facility: CLINIC | Age: 76
End: 2021-06-30
Payer: MEDICARE

## 2021-06-30 PROCEDURE — 99439 PR CHRONIC CARE MGMT, EA ADDTL 20 MIN: ICD-10-PCS | Mod: S$PBB,,, | Performed by: INTERNAL MEDICINE

## 2021-06-30 PROCEDURE — 99439 CHRNC CARE MGMT STAF EA ADDL: CPT | Mod: S$PBB,,, | Performed by: INTERNAL MEDICINE

## 2021-06-30 PROCEDURE — 99490 PR CHRONIC CARE MGMT, 1ST 20 MIN: ICD-10-PCS | Mod: S$PBB,,, | Performed by: INTERNAL MEDICINE

## 2021-06-30 PROCEDURE — 99439 CHRNC CARE MGMT STAF EA ADDL: CPT | Mod: PBBFAC,PO | Performed by: INTERNAL MEDICINE

## 2021-06-30 PROCEDURE — 99490 CHRNC CARE MGMT STAFF 1ST 20: CPT | Mod: S$PBB,,, | Performed by: INTERNAL MEDICINE

## 2021-06-30 PROCEDURE — 99490 CHRNC CARE MGMT STAFF 1ST 20: CPT | Mod: PBBFAC,PO | Performed by: INTERNAL MEDICINE

## 2021-07-09 ENCOUNTER — TELEPHONE (OUTPATIENT)
Dept: INTERNAL MEDICINE | Facility: CLINIC | Age: 76
End: 2021-07-09

## 2021-07-13 ENCOUNTER — TELEPHONE (OUTPATIENT)
Dept: RESEARCH | Facility: HOSPITAL | Age: 76
End: 2021-07-13

## 2021-07-16 ENCOUNTER — RESEARCH ENCOUNTER (OUTPATIENT)
Dept: RESEARCH | Facility: HOSPITAL | Age: 76
End: 2021-07-16

## 2021-07-16 ENCOUNTER — TELEPHONE (OUTPATIENT)
Dept: INTERNAL MEDICINE | Facility: CLINIC | Age: 76
End: 2021-07-16

## 2021-07-16 ENCOUNTER — OFFICE VISIT (OUTPATIENT)
Dept: INTERNAL MEDICINE | Facility: CLINIC | Age: 76
End: 2021-07-16
Payer: MEDICARE

## 2021-07-16 VITALS
HEART RATE: 80 BPM | TEMPERATURE: 98 F | WEIGHT: 214.31 LBS | DIASTOLIC BLOOD PRESSURE: 60 MMHG | RESPIRATION RATE: 15 BRPM | BODY MASS INDEX: 35.71 KG/M2 | HEIGHT: 65 IN | SYSTOLIC BLOOD PRESSURE: 150 MMHG

## 2021-07-16 DIAGNOSIS — N18.32 STAGE 3B CHRONIC KIDNEY DISEASE: Primary | ICD-10-CM

## 2021-07-16 DIAGNOSIS — E11.9 DM TYPE 2 WITHOUT RETINOPATHY: ICD-10-CM

## 2021-07-16 DIAGNOSIS — I10 ESSENTIAL HYPERTENSION: ICD-10-CM

## 2021-07-16 PROCEDURE — 99213 PR OFFICE/OUTPT VISIT, EST, LEVL III, 20-29 MIN: ICD-10-PCS | Mod: S$PBB,,, | Performed by: INTERNAL MEDICINE

## 2021-07-16 PROCEDURE — 99213 OFFICE O/P EST LOW 20 MIN: CPT | Mod: S$PBB,,, | Performed by: INTERNAL MEDICINE

## 2021-07-16 PROCEDURE — 99999 PR PBB SHADOW E&M-EST. PATIENT-LVL V: CPT | Mod: PBBFAC,,, | Performed by: INTERNAL MEDICINE

## 2021-07-16 PROCEDURE — 99999 PR PBB SHADOW E&M-EST. PATIENT-LVL V: ICD-10-PCS | Mod: PBBFAC,,, | Performed by: INTERNAL MEDICINE

## 2021-07-16 PROCEDURE — 99215 OFFICE O/P EST HI 40 MIN: CPT | Mod: PBBFAC,PO | Performed by: INTERNAL MEDICINE

## 2021-07-16 RX ORDER — LOSARTAN POTASSIUM 50 MG/1
50 TABLET ORAL DAILY
Qty: 30 TABLET | Refills: 3 | Status: SHIPPED | OUTPATIENT
Start: 2021-07-16 | End: 2021-12-31 | Stop reason: SDUPTHER

## 2021-07-20 ENCOUNTER — DOCUMENT SCAN (OUTPATIENT)
Dept: HOME HEALTH SERVICES | Facility: HOSPITAL | Age: 76
End: 2021-07-20
Payer: MEDICARE

## 2021-07-21 ENCOUNTER — PATIENT MESSAGE (OUTPATIENT)
Dept: INTERNAL MEDICINE | Facility: CLINIC | Age: 76
End: 2021-07-21

## 2021-07-22 ENCOUNTER — DOCUMENT SCAN (OUTPATIENT)
Dept: HOME HEALTH SERVICES | Facility: HOSPITAL | Age: 76
End: 2021-07-22
Payer: MEDICARE

## 2021-07-22 DIAGNOSIS — G47.00 INSOMNIA, UNSPECIFIED TYPE: ICD-10-CM

## 2021-07-23 RX ORDER — DIAZEPAM 5 MG/1
TABLET ORAL
Qty: 30 TABLET | Refills: 3 | Status: SHIPPED | OUTPATIENT
Start: 2021-07-23 | End: 2021-09-20 | Stop reason: SDUPTHER

## 2021-07-26 ENCOUNTER — TELEPHONE (OUTPATIENT)
Dept: CARDIOLOGY | Facility: HOSPITAL | Age: 76
End: 2021-07-26

## 2021-07-29 ENCOUNTER — PES CALL (OUTPATIENT)
Dept: ADMINISTRATIVE | Facility: CLINIC | Age: 76
End: 2021-07-29

## 2021-07-31 ENCOUNTER — EXTERNAL CHRONIC CARE MANAGEMENT (OUTPATIENT)
Dept: PRIMARY CARE CLINIC | Facility: CLINIC | Age: 76
End: 2021-07-31
Payer: MEDICARE

## 2021-07-31 PROCEDURE — 99439 PR CHRONIC CARE MGMT, EA ADDTL 20 MIN: ICD-10-PCS | Mod: S$PBB,,, | Performed by: INTERNAL MEDICINE

## 2021-07-31 PROCEDURE — 99439 CHRNC CARE MGMT STAF EA ADDL: CPT | Mod: S$PBB,,, | Performed by: INTERNAL MEDICINE

## 2021-07-31 PROCEDURE — 99490 CHRNC CARE MGMT STAFF 1ST 20: CPT | Mod: S$PBB,,, | Performed by: INTERNAL MEDICINE

## 2021-07-31 PROCEDURE — 99490 CHRNC CARE MGMT STAFF 1ST 20: CPT | Mod: PBBFAC,PO | Performed by: INTERNAL MEDICINE

## 2021-07-31 PROCEDURE — 99439 CHRNC CARE MGMT STAF EA ADDL: CPT | Mod: PBBFAC,PO | Performed by: INTERNAL MEDICINE

## 2021-07-31 PROCEDURE — 99490 PR CHRONIC CARE MGMT, 1ST 20 MIN: ICD-10-PCS | Mod: S$PBB,,, | Performed by: INTERNAL MEDICINE

## 2021-08-09 ENCOUNTER — OFFICE VISIT (OUTPATIENT)
Dept: PRIMARY CARE CLINIC | Facility: CLINIC | Age: 76
End: 2021-08-09
Payer: MEDICARE

## 2021-08-09 VITALS
HEART RATE: 81 BPM | WEIGHT: 199.94 LBS | BODY MASS INDEX: 34.13 KG/M2 | OXYGEN SATURATION: 97 % | SYSTOLIC BLOOD PRESSURE: 130 MMHG | HEIGHT: 64 IN | TEMPERATURE: 98 F | RESPIRATION RATE: 16 BRPM | DIASTOLIC BLOOD PRESSURE: 60 MMHG

## 2021-08-09 DIAGNOSIS — E78.00 PURE HYPERCHOLESTEROLEMIA: ICD-10-CM

## 2021-08-09 DIAGNOSIS — I48.0 PAROXYSMAL ATRIAL FIBRILLATION: ICD-10-CM

## 2021-08-09 DIAGNOSIS — E11.9 CONTROLLED TYPE 2 DIABETES MELLITUS WITHOUT COMPLICATION, WITHOUT LONG-TERM CURRENT USE OF INSULIN: ICD-10-CM

## 2021-08-09 DIAGNOSIS — D50.0 IRON DEFICIENCY ANEMIA DUE TO CHRONIC BLOOD LOSS: ICD-10-CM

## 2021-08-09 DIAGNOSIS — I10 ESSENTIAL HYPERTENSION: ICD-10-CM

## 2021-08-09 DIAGNOSIS — R26.89 BALANCE PROBLEMS: ICD-10-CM

## 2021-08-09 DIAGNOSIS — E11.9 DM TYPE 2 WITHOUT RETINOPATHY: Primary | ICD-10-CM
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
## 2021-08-09 DIAGNOSIS — N18.32 STAGE 3B CHRONIC KIDNEY DISEASE: ICD-10-CM

## 2021-08-09 LAB — GLUCOSE SERPL-MCNC: 231 MG/DL (ref 70–110)

## 2021-08-09 PROCEDURE — 99215 PR OFFICE/OUTPT VISIT, EST, LEVL V, 40-54 MIN: ICD-10-PCS | Mod: S$PBB,,, | Performed by: NURSE PRACTITIONER

## 2021-08-09 PROCEDURE — 99215 OFFICE O/P EST HI 40 MIN: CPT | Mod: S$PBB,,, | Performed by: NURSE PRACTITIONER

## 2021-08-09 PROCEDURE — 99999 PR PBB SHADOW E&M-EST. PATIENT-LVL V: CPT | Mod: PBBFAC,,, | Performed by: NURSE PRACTITIONER

## 2021-08-09 PROCEDURE — 99999 PR PBB SHADOW E&M-EST. PATIENT-LVL V: ICD-10-PCS | Mod: PBBFAC,,, | Performed by: NURSE PRACTITIONER

## 2021-08-09 PROCEDURE — 99215 OFFICE O/P EST HI 40 MIN: CPT | Mod: PBBFAC,PN | Performed by: NURSE PRACTITIONER

## 2021-08-09 PROCEDURE — 82962 GLUCOSE BLOOD TEST: CPT | Mod: PBBFAC,PN | Performed by: NURSE PRACTITIONER

## 2021-08-09 RX ORDER — INSULIN PUMP SYRINGE, 3 ML
EACH MISCELLANEOUS
Qty: 1 EACH | Refills: 0 | Status: SHIPPED | OUTPATIENT
Start: 2021-08-09

## 2021-08-09 RX ORDER — SITAGLIPTIN 100 MG/1
100 TABLET, FILM COATED ORAL DAILY
Qty: 30 TABLET | Refills: 3 | Status: SHIPPED | OUTPATIENT
Start: 2021-08-09 | End: 2021-09-07 | Stop reason: SDUPTHER

## 2021-08-09 RX ORDER — FLASH GLUCOSE SENSOR
1 KIT MISCELLANEOUS
Qty: 2 KIT | Refills: 11 | Status: SHIPPED | OUTPATIENT
Start: 2021-08-09 | End: 2022-09-01 | Stop reason: SDUPTHER

## 2021-08-09 RX ORDER — LANCETS 28 GAUGE
EACH MISCELLANEOUS 2 TIMES DAILY
Qty: 100 EACH | Refills: 6 | Status: SHIPPED | OUTPATIENT
Start: 2021-08-09 | End: 2021-09-08

## 2021-08-10 ENCOUNTER — TELEPHONE (OUTPATIENT)
Dept: FAMILY MEDICINE | Facility: CLINIC | Age: 76
End: 2021-08-10

## 2021-08-11 ENCOUNTER — TELEPHONE (OUTPATIENT)
Dept: FAMILY MEDICINE | Facility: CLINIC | Age: 76
End: 2021-08-11

## 2021-08-11 ENCOUNTER — PATIENT MESSAGE (OUTPATIENT)
Dept: FAMILY MEDICINE | Facility: CLINIC | Age: 76
End: 2021-08-11

## 2021-08-16 ENCOUNTER — PATIENT OUTREACH (OUTPATIENT)
Dept: ADMINISTRATIVE | Facility: OTHER | Age: 76
End: 2021-08-16

## 2021-08-24 ENCOUNTER — PES CALL (OUTPATIENT)
Dept: ADMINISTRATIVE | Facility: CLINIC | Age: 76
End: 2021-08-24

## 2021-08-25 ENCOUNTER — PES CALL (OUTPATIENT)
Dept: ADMINISTRATIVE | Facility: CLINIC | Age: 76
End: 2021-08-25

## 2021-08-25 ENCOUNTER — TELEPHONE (OUTPATIENT)
Dept: INTERNAL MEDICINE | Facility: CLINIC | Age: 76
End: 2021-08-25

## 2021-08-27 ENCOUNTER — PES CALL (OUTPATIENT)
Dept: ADMINISTRATIVE | Facility: CLINIC | Age: 76
End: 2021-08-27

## 2021-08-31 ENCOUNTER — EXTERNAL CHRONIC CARE MANAGEMENT (OUTPATIENT)
Dept: PRIMARY CARE CLINIC | Facility: CLINIC | Age: 76
End: 2021-08-31
Payer: MEDICARE

## 2021-08-31 PROCEDURE — 99439 CHRNC CARE MGMT STAF EA ADDL: CPT | Mod: PBBFAC,25,27,PO | Performed by: INTERNAL MEDICINE

## 2021-08-31 PROCEDURE — 99439 CHRNC CARE MGMT STAF EA ADDL: CPT | Mod: S$PBB,,, | Performed by: INTERNAL MEDICINE

## 2021-08-31 PROCEDURE — 99490 PR CHRONIC CARE MGMT, 1ST 20 MIN: ICD-10-PCS | Mod: S$PBB,,, | Performed by: INTERNAL MEDICINE

## 2021-08-31 PROCEDURE — 99490 CHRNC CARE MGMT STAFF 1ST 20: CPT | Mod: PBBFAC,PO | Performed by: INTERNAL MEDICINE

## 2021-08-31 PROCEDURE — 99490 CHRNC CARE MGMT STAFF 1ST 20: CPT | Mod: S$PBB,,, | Performed by: INTERNAL MEDICINE

## 2021-08-31 PROCEDURE — 99439 PR CHRONIC CARE MGMT, EA ADDTL 20 MIN: ICD-10-PCS | Mod: S$PBB,,, | Performed by: INTERNAL MEDICINE

## 2021-09-06 ENCOUNTER — PATIENT MESSAGE (OUTPATIENT)
Dept: INTERNAL MEDICINE | Facility: CLINIC | Age: 76
End: 2021-09-06

## 2021-09-06 DIAGNOSIS — I10 ESSENTIAL HYPERTENSION: ICD-10-CM

## 2021-09-06 DIAGNOSIS — I48.0 PAROXYSMAL ATRIAL FIBRILLATION: ICD-10-CM

## 2021-09-06 DIAGNOSIS — N18.32 STAGE 3B CHRONIC KIDNEY DISEASE: ICD-10-CM

## 2021-09-07 ENCOUNTER — TELEPHONE (OUTPATIENT)
Dept: NEPHROLOGY | Facility: CLINIC | Age: 76
End: 2021-09-07

## 2021-09-08 RX ORDER — METOPROLOL SUCCINATE 25 MG/1
25 TABLET, EXTENDED RELEASE ORAL DAILY
Qty: 30 TABLET | Refills: 11 | Status: SHIPPED | OUTPATIENT
Start: 2021-09-08 | End: 2022-09-01 | Stop reason: SDUPTHER

## 2021-09-17 ENCOUNTER — PATIENT MESSAGE (OUTPATIENT)
Dept: INTERNAL MEDICINE | Facility: CLINIC | Age: 76
End: 2021-09-17

## 2021-09-17 DIAGNOSIS — G47.00 INSOMNIA, UNSPECIFIED TYPE: ICD-10-CM

## 2021-09-17 DIAGNOSIS — K21.9 GASTROESOPHAGEAL REFLUX DISEASE: ICD-10-CM

## 2021-09-20 RX ORDER — DIAZEPAM 5 MG/1
5 TABLET ORAL NIGHTLY
Qty: 30 TABLET | Refills: 3 | Status: SHIPPED | OUTPATIENT
Start: 2021-09-20 | End: 2022-01-16 | Stop reason: SDUPTHER

## 2021-09-20 RX ORDER — PANTOPRAZOLE SODIUM 40 MG/1
40 TABLET, DELAYED RELEASE ORAL DAILY
Qty: 90 TABLET | Refills: 3 | Status: SHIPPED | OUTPATIENT
Start: 2021-09-20 | End: 2022-11-06 | Stop reason: SDUPTHER

## 2021-09-27 ENCOUNTER — LAB VISIT (OUTPATIENT)
Dept: LAB | Facility: HOSPITAL | Age: 76
End: 2021-09-27
Attending: INTERNAL MEDICINE
Payer: MEDICARE

## 2021-09-27 ENCOUNTER — TELEPHONE (OUTPATIENT)
Dept: NEPHROLOGY | Facility: CLINIC | Age: 76
End: 2021-09-27

## 2021-09-27 ENCOUNTER — OFFICE VISIT (OUTPATIENT)
Dept: PRIMARY CARE CLINIC | Facility: CLINIC | Age: 76
End: 2021-09-27
Payer: MEDICARE

## 2021-09-27 VITALS
HEART RATE: 72 BPM | SYSTOLIC BLOOD PRESSURE: 142 MMHG | HEIGHT: 65 IN | DIASTOLIC BLOOD PRESSURE: 70 MMHG | RESPIRATION RATE: 16 BRPM | WEIGHT: 203.25 LBS | OXYGEN SATURATION: 97 % | BODY MASS INDEX: 33.86 KG/M2 | TEMPERATURE: 98 F

## 2021-09-27 DIAGNOSIS — R60.0 PERIPHERAL EDEMA: ICD-10-CM

## 2021-09-27 DIAGNOSIS — E11.9 CONTROLLED TYPE 2 DIABETES MELLITUS WITHOUT COMPLICATION, WITHOUT LONG-TERM CURRENT USE OF INSULIN: ICD-10-CM

## 2021-09-27 DIAGNOSIS — E11.9 CONTROLLED TYPE 2 DIABETES MELLITUS WITHOUT COMPLICATION, WITHOUT LONG-TERM CURRENT USE OF INSULIN: Primary | ICD-10-CM

## 2021-09-27 DIAGNOSIS — R05.9 COUGH: ICD-10-CM

## 2021-09-27 DIAGNOSIS — I10 ESSENTIAL HYPERTENSION: ICD-10-CM

## 2021-09-27 DIAGNOSIS — N18.32 STAGE 3B CHRONIC KIDNEY DISEASE: ICD-10-CM

## 2021-09-27 DIAGNOSIS — E78.00 PURE HYPERCHOLESTEROLEMIA: ICD-10-CM

## 2021-09-27 LAB
ALBUMIN SERPL BCP-MCNC: 3.9 G/DL (ref 3.5–5.2)
ALP SERPL-CCNC: 51 U/L (ref 55–135)
ALT SERPL W/O P-5'-P-CCNC: 36 U/L (ref 10–44)
ANION GAP SERPL CALC-SCNC: 14 MMOL/L (ref 8–16)
AST SERPL-CCNC: 43 U/L (ref 10–40)
BILIRUB SERPL-MCNC: 0.6 MG/DL (ref 0.1–1)
BUN SERPL-MCNC: 11 MG/DL (ref 8–23)
CALCIUM SERPL-MCNC: 10 MG/DL (ref 8.7–10.5)
CHLORIDE SERPL-SCNC: 102 MMOL/L (ref 95–110)
CHOLEST SERPL-MCNC: 152 MG/DL (ref 120–199)
CHOLEST/HDLC SERPL: 2.7 {RATIO} (ref 2–5)
CO2 SERPL-SCNC: 25 MMOL/L (ref 23–29)
CREAT SERPL-MCNC: 1.5 MG/DL (ref 0.5–1.4)
EST. GFR  (AFRICAN AMERICAN): 38.7 ML/MIN/1.73 M^2
EST. GFR  (NON AFRICAN AMERICAN): 33.6 ML/MIN/1.73 M^2
GLUCOSE SERPL-MCNC: 172 MG/DL (ref 70–110)
GLUCOSE SERPL-MCNC: 183 MG/DL (ref 70–110)
HDLC SERPL-MCNC: 57 MG/DL (ref 40–75)
HDLC SERPL: 37.5 % (ref 20–50)
LDLC SERPL CALC-MCNC: 72.4 MG/DL (ref 63–159)
NONHDLC SERPL-MCNC: 95 MG/DL
POTASSIUM SERPL-SCNC: 4 MMOL/L (ref 3.5–5.1)
PROT SERPL-MCNC: 7.6 G/DL (ref 6–8.4)
SODIUM SERPL-SCNC: 141 MMOL/L (ref 136–145)
TRIGL SERPL-MCNC: 113 MG/DL (ref 30–150)

## 2021-09-27 PROCEDURE — 83036 HEMOGLOBIN GLYCOSYLATED A1C: CPT | Performed by: NURSE PRACTITIONER

## 2021-09-27 PROCEDURE — 99999 PR PBB SHADOW E&M-EST. PATIENT-LVL V: CPT | Mod: PBBFAC,,, | Performed by: NURSE PRACTITIONER

## 2021-09-27 PROCEDURE — 99214 OFFICE O/P EST MOD 30 MIN: CPT | Mod: S$PBB,,, | Performed by: NURSE PRACTITIONER

## 2021-09-27 PROCEDURE — 80061 LIPID PANEL: CPT | Performed by: NURSE PRACTITIONER

## 2021-09-27 PROCEDURE — 99215 OFFICE O/P EST HI 40 MIN: CPT | Mod: PBBFAC,PN | Performed by: NURSE PRACTITIONER

## 2021-09-27 PROCEDURE — 80053 COMPREHEN METABOLIC PANEL: CPT | Performed by: NURSE PRACTITIONER

## 2021-09-27 PROCEDURE — 36415 COLL VENOUS BLD VENIPUNCTURE: CPT | Mod: PN | Performed by: NURSE PRACTITIONER

## 2021-09-27 PROCEDURE — 99214 PR OFFICE/OUTPT VISIT, EST, LEVL IV, 30-39 MIN: ICD-10-PCS | Mod: S$PBB,,, | Performed by: NURSE PRACTITIONER

## 2021-09-27 PROCEDURE — 99999 PR PBB SHADOW E&M-EST. PATIENT-LVL V: ICD-10-PCS | Mod: PBBFAC,,, | Performed by: NURSE PRACTITIONER

## 2021-09-27 PROCEDURE — 82962 GLUCOSE BLOOD TEST: CPT | Mod: PBBFAC,PN | Performed by: NURSE PRACTITIONER

## 2021-09-28 ENCOUNTER — TELEPHONE (OUTPATIENT)
Dept: OTOLARYNGOLOGY | Facility: CLINIC | Age: 76
End: 2021-09-28

## 2021-09-28 LAB
ESTIMATED AVG GLUCOSE: 146 MG/DL (ref 68–131)
HBA1C MFR BLD: 6.7 % (ref 4–5.6)

## 2021-09-30 ENCOUNTER — TELEPHONE (OUTPATIENT)
Dept: ADMINISTRATIVE | Facility: HOSPITAL | Age: 76
End: 2021-09-30

## 2021-09-30 ENCOUNTER — EXTERNAL CHRONIC CARE MANAGEMENT (OUTPATIENT)
Dept: PRIMARY CARE CLINIC | Facility: CLINIC | Age: 76
End: 2021-09-30
Payer: MEDICARE

## 2021-09-30 PROCEDURE — 99439 PR CHRONIC CARE MGMT, EA ADDTL 20 MIN: ICD-10-PCS | Mod: S$PBB,,, | Performed by: INTERNAL MEDICINE

## 2021-09-30 PROCEDURE — 99439 CHRNC CARE MGMT STAF EA ADDL: CPT | Mod: PBBFAC,PO | Performed by: INTERNAL MEDICINE

## 2021-09-30 PROCEDURE — 99490 CHRNC CARE MGMT STAFF 1ST 20: CPT | Mod: S$PBB,,, | Performed by: INTERNAL MEDICINE

## 2021-09-30 PROCEDURE — 99490 CHRNC CARE MGMT STAFF 1ST 20: CPT | Mod: PBBFAC,PO | Performed by: INTERNAL MEDICINE

## 2021-09-30 PROCEDURE — 99439 CHRNC CARE MGMT STAF EA ADDL: CPT | Mod: S$PBB,,, | Performed by: INTERNAL MEDICINE

## 2021-09-30 PROCEDURE — 99490 PR CHRONIC CARE MGMT, 1ST 20 MIN: ICD-10-PCS | Mod: S$PBB,,, | Performed by: INTERNAL MEDICINE

## 2021-10-02 ENCOUNTER — IMMUNIZATION (OUTPATIENT)
Dept: INTERNAL MEDICINE | Facility: CLINIC | Age: 76
End: 2021-10-02
Payer: MEDICARE

## 2021-10-02 ENCOUNTER — PATIENT MESSAGE (OUTPATIENT)
Dept: INTERNAL MEDICINE | Facility: CLINIC | Age: 76
End: 2021-10-02

## 2021-10-02 DIAGNOSIS — Z23 NEED FOR VACCINATION: Primary | ICD-10-CM

## 2021-10-02 PROCEDURE — 91300 COVID-19, MRNA, LNP-S, PF, 30 MCG/0.3 ML DOSE VACCINE: CPT | Mod: PBBFAC

## 2021-10-02 PROCEDURE — 0003A COVID-19, MRNA, LNP-S, PF, 30 MCG/0.3 ML DOSE VACCINE: CPT | Mod: PBBFAC,CV19

## 2021-10-04 RX ORDER — GABAPENTIN 300 MG/1
CAPSULE ORAL
Qty: 270 CAPSULE | Refills: 0 | Status: SHIPPED | OUTPATIENT
Start: 2021-10-04 | End: 2022-01-10 | Stop reason: SDUPTHER

## 2021-10-10 ENCOUNTER — PATIENT OUTREACH (OUTPATIENT)
Dept: ADMINISTRATIVE | Facility: OTHER | Age: 76
End: 2021-10-10

## 2021-10-31 ENCOUNTER — EXTERNAL CHRONIC CARE MANAGEMENT (OUTPATIENT)
Dept: PRIMARY CARE CLINIC | Facility: CLINIC | Age: 76
End: 2021-10-31
Payer: MEDICARE

## 2021-10-31 PROCEDURE — 99490 CHRNC CARE MGMT STAFF 1ST 20: CPT | Mod: PBBFAC,PO | Performed by: INTERNAL MEDICINE

## 2021-10-31 PROCEDURE — 99490 PR CHRONIC CARE MGMT, 1ST 20 MIN: ICD-10-PCS | Mod: S$PBB,,, | Performed by: INTERNAL MEDICINE

## 2021-10-31 PROCEDURE — 99490 CHRNC CARE MGMT STAFF 1ST 20: CPT | Mod: S$PBB,,, | Performed by: INTERNAL MEDICINE

## 2021-11-01 ENCOUNTER — TELEPHONE (OUTPATIENT)
Dept: ADMINISTRATIVE | Facility: CLINIC | Age: 76
End: 2021-11-01
Payer: MEDICARE

## 2021-11-18 ENCOUNTER — TELEPHONE (OUTPATIENT)
Dept: RESEARCH | Facility: HOSPITAL | Age: 76
End: 2021-11-18
Payer: MEDICARE

## 2021-11-18 DIAGNOSIS — M41.9 SCOLIOSIS OF LUMBAR SPINE, UNSPECIFIED SCOLIOSIS TYPE: ICD-10-CM

## 2021-11-18 RX ORDER — BACLOFEN 10 MG/1
TABLET ORAL
Qty: 90 TABLET | Refills: 3 | Status: SHIPPED | OUTPATIENT
Start: 2021-11-18 | End: 2022-05-29 | Stop reason: SDUPTHER

## 2021-11-19 DIAGNOSIS — E11.9 DM TYPE 2 WITHOUT RETINOPATHY: ICD-10-CM

## 2021-11-22 ENCOUNTER — RESEARCH ENCOUNTER (OUTPATIENT)
Dept: RESEARCH | Facility: HOSPITAL | Age: 76
End: 2021-11-22
Payer: MEDICARE

## 2021-11-22 RX ORDER — METFORMIN HYDROCHLORIDE 500 MG/1
500 TABLET ORAL
Qty: 90 TABLET | Refills: 0 | Status: SHIPPED | OUTPATIENT
Start: 2021-11-22 | End: 2022-06-28 | Stop reason: SDUPTHER

## 2021-11-30 ENCOUNTER — EXTERNAL CHRONIC CARE MANAGEMENT (OUTPATIENT)
Dept: PRIMARY CARE CLINIC | Facility: CLINIC | Age: 76
End: 2021-11-30
Payer: MEDICARE

## 2021-11-30 PROCEDURE — 99487 PR COMPLX CHRON CARE MGMT, 1ST HR, PER MONTH: ICD-10-PCS | Mod: S$PBB,,, | Performed by: INTERNAL MEDICINE

## 2021-11-30 PROCEDURE — 99487 CPLX CHRNC CARE 1ST 60 MIN: CPT | Mod: S$PBB,,, | Performed by: INTERNAL MEDICINE

## 2021-11-30 PROCEDURE — 99489 CPLX CHRNC CARE EA ADDL 30: CPT | Mod: S$PBB,,, | Performed by: INTERNAL MEDICINE

## 2021-11-30 PROCEDURE — 99489 PR COMPLX CHRON CARE MGMT, EA ADDTL 30 MIN, PER MONTH: ICD-10-PCS | Mod: S$PBB,,, | Performed by: INTERNAL MEDICINE

## 2021-11-30 PROCEDURE — 99489 CPLX CHRNC CARE EA ADDL 30: CPT | Mod: PBBFAC,PO | Performed by: INTERNAL MEDICINE

## 2021-11-30 PROCEDURE — 99487 CPLX CHRNC CARE 1ST 60 MIN: CPT | Mod: PBBFAC,PO | Performed by: INTERNAL MEDICINE

## 2021-12-13 ENCOUNTER — TELEPHONE (OUTPATIENT)
Dept: ELECTROPHYSIOLOGY | Facility: CLINIC | Age: 76
End: 2021-12-13
Payer: MEDICARE

## 2021-12-31 ENCOUNTER — EXTERNAL CHRONIC CARE MANAGEMENT (OUTPATIENT)
Dept: PRIMARY CARE CLINIC | Facility: CLINIC | Age: 76
End: 2021-12-31
Payer: MEDICARE

## 2021-12-31 PROCEDURE — 99490 PR CHRONIC CARE MGMT, 1ST 20 MIN: ICD-10-PCS | Mod: S$PBB,,, | Performed by: INTERNAL MEDICINE

## 2021-12-31 PROCEDURE — 99490 CHRNC CARE MGMT STAFF 1ST 20: CPT | Mod: PBBFAC,PO | Performed by: INTERNAL MEDICINE

## 2021-12-31 PROCEDURE — 99490 CHRNC CARE MGMT STAFF 1ST 20: CPT | Mod: S$PBB,,, | Performed by: INTERNAL MEDICINE

## 2022-01-03 RX ORDER — LOSARTAN POTASSIUM 50 MG/1
50 TABLET ORAL DAILY
Qty: 30 TABLET | Refills: 3 | Status: SHIPPED | OUTPATIENT
Start: 2022-01-03 | End: 2022-05-09 | Stop reason: SDUPTHER

## 2022-01-11 RX ORDER — GABAPENTIN 300 MG/1
CAPSULE ORAL
Qty: 270 CAPSULE | Refills: 3 | Status: SHIPPED | OUTPATIENT
Start: 2022-01-11 | End: 2023-02-03 | Stop reason: SDUPTHER

## 2022-01-12 ENCOUNTER — PES CALL (OUTPATIENT)
Dept: ADMINISTRATIVE | Facility: CLINIC | Age: 77
End: 2022-01-12
Payer: MEDICARE

## 2022-01-13 ENCOUNTER — TELEPHONE (OUTPATIENT)
Dept: ELECTROPHYSIOLOGY | Facility: CLINIC | Age: 77
End: 2022-01-13
Payer: MEDICARE

## 2022-01-13 NOTE — TELEPHONE ENCOUNTER
Spoke with patients son he states they will need to reschedule do to scheduling conflict. The pt rescheduled to 5/26/22. Does not need a reminder letter due to access to [facility] my chart narayan.

## 2022-01-13 NOTE — TELEPHONE ENCOUNTER
Spoke with patient she wanted her ekg moved closer to the appointment time ekg is scheduled for 1:45pm. Patient confirmed new appointment time.     ----- Message from Shelbie Betancourt MA sent at 1/13/2022 12:59 PM CST -----  The patient son would like to know can his mother come in for 2:00 pm she is schedule a EKG for 1:15 pm please call Jasbir at [phone]. Thank you

## 2022-01-16 DIAGNOSIS — G47.00 INSOMNIA, UNSPECIFIED TYPE: ICD-10-CM

## 2022-01-18 RX ORDER — DIAZEPAM 5 MG/1
5 TABLET ORAL NIGHTLY
Qty: 30 TABLET | Refills: 3 | Status: SHIPPED | OUTPATIENT
Start: 2022-01-18 | End: 2022-05-23 | Stop reason: SDUPTHER

## 2022-01-19 ENCOUNTER — LAB VISIT (OUTPATIENT)
Dept: LAB | Facility: HOSPITAL | Age: 77
End: 2022-01-19
Attending: NURSE PRACTITIONER
Payer: MEDICARE

## 2022-01-19 ENCOUNTER — CLINICAL SUPPORT (OUTPATIENT)
Dept: PRIMARY CARE CLINIC | Facility: CLINIC | Age: 77
End: 2022-01-19
Payer: MEDICARE

## 2022-01-19 DIAGNOSIS — Z23 NEED FOR INFLUENZA VACCINATION: Primary | ICD-10-CM

## 2022-01-19 DIAGNOSIS — E11.9 CONTROLLED TYPE 2 DIABETES MELLITUS WITHOUT COMPLICATION, WITHOUT LONG-TERM CURRENT USE OF INSULIN: ICD-10-CM

## 2022-01-19 DIAGNOSIS — E78.00 PURE HYPERCHOLESTEROLEMIA: ICD-10-CM

## 2022-01-19 LAB
ALBUMIN SERPL BCP-MCNC: 3.7 G/DL (ref 3.5–5.2)
ALP SERPL-CCNC: 58 U/L (ref 55–135)
ALT SERPL W/O P-5'-P-CCNC: 27 U/L (ref 10–44)
ANION GAP SERPL CALC-SCNC: 12 MMOL/L (ref 8–16)
AST SERPL-CCNC: 32 U/L (ref 10–40)
BILIRUB SERPL-MCNC: 0.5 MG/DL (ref 0.1–1)
BUN SERPL-MCNC: 22 MG/DL (ref 8–23)
CALCIUM SERPL-MCNC: 9.8 MG/DL (ref 8.7–10.5)
CHLORIDE SERPL-SCNC: 102 MMOL/L (ref 95–110)
CHOLEST SERPL-MCNC: 140 MG/DL (ref 120–199)
CHOLEST/HDLC SERPL: 2.9 {RATIO} (ref 2–5)
CO2 SERPL-SCNC: 27 MMOL/L (ref 23–29)
CREAT SERPL-MCNC: 1.7 MG/DL (ref 0.5–1.4)
EST. GFR  (AFRICAN AMERICAN): 33.1 ML/MIN/1.73 M^2
EST. GFR  (NON AFRICAN AMERICAN): 28.7 ML/MIN/1.73 M^2
ESTIMATED AVG GLUCOSE: 146 MG/DL (ref 68–131)
GLUCOSE SERPL-MCNC: 212 MG/DL (ref 70–110)
HBA1C MFR BLD: 6.7 % (ref 4–5.6)
HDLC SERPL-MCNC: 48 MG/DL (ref 40–75)
HDLC SERPL: 34.3 % (ref 20–50)
LDLC SERPL CALC-MCNC: 60.4 MG/DL (ref 63–159)
NONHDLC SERPL-MCNC: 92 MG/DL
POTASSIUM SERPL-SCNC: 4 MMOL/L (ref 3.5–5.1)
PROT SERPL-MCNC: 7.5 G/DL (ref 6–8.4)
SODIUM SERPL-SCNC: 141 MMOL/L (ref 136–145)
TRIGL SERPL-MCNC: 158 MG/DL (ref 30–150)

## 2022-01-19 PROCEDURE — 80061 LIPID PANEL: CPT | Performed by: NURSE PRACTITIONER

## 2022-01-19 PROCEDURE — 80053 COMPREHEN METABOLIC PANEL: CPT | Performed by: NURSE PRACTITIONER

## 2022-01-19 PROCEDURE — 90694 VACC AIIV4 NO PRSRV 0.5ML IM: CPT | Mod: PBBFAC,PN

## 2022-01-19 PROCEDURE — 36415 COLL VENOUS BLD VENIPUNCTURE: CPT | Mod: PN | Performed by: NURSE PRACTITIONER

## 2022-01-19 PROCEDURE — G0008 ADMIN INFLUENZA VIRUS VAC: HCPCS | Mod: PBBFAC,PN

## 2022-01-19 PROCEDURE — 83036 HEMOGLOBIN GLYCOSYLATED A1C: CPT | Performed by: NURSE PRACTITIONER

## 2022-01-19 NOTE — PROGRESS NOTES
Two patient identifiers verified.  Allergies reviewed.   Flu IM administered to right  deltoid per MD order.  Patient tolerated injection well; no redness, bleeding, or bruising noted to injection site.  Patient instructed to remain in clinic setting for 15 minutes.  Verbalizes understanding.

## 2022-01-21 ENCOUNTER — TELEPHONE (OUTPATIENT)
Dept: INTERNAL MEDICINE | Facility: CLINIC | Age: 77
End: 2022-01-21
Payer: MEDICARE

## 2022-01-31 ENCOUNTER — EXTERNAL CHRONIC CARE MANAGEMENT (OUTPATIENT)
Dept: PRIMARY CARE CLINIC | Facility: CLINIC | Age: 77
End: 2022-01-31
Payer: MEDICARE

## 2022-01-31 PROCEDURE — 99439 PR CHRONIC CARE MGMT, EA ADDTL 20 MIN: ICD-10-PCS | Mod: S$PBB,,, | Performed by: INTERNAL MEDICINE

## 2022-01-31 PROCEDURE — 99439 CHRNC CARE MGMT STAF EA ADDL: CPT | Mod: PBBFAC,PO | Performed by: INTERNAL MEDICINE

## 2022-01-31 PROCEDURE — 99490 CHRNC CARE MGMT STAFF 1ST 20: CPT | Mod: PBBFAC,25,PO | Performed by: INTERNAL MEDICINE

## 2022-01-31 PROCEDURE — 99490 PR CHRONIC CARE MGMT, 1ST 20 MIN: ICD-10-PCS | Mod: S$PBB,,, | Performed by: INTERNAL MEDICINE

## 2022-01-31 PROCEDURE — 99490 CHRNC CARE MGMT STAFF 1ST 20: CPT | Mod: S$PBB,,, | Performed by: INTERNAL MEDICINE

## 2022-01-31 PROCEDURE — 99439 CHRNC CARE MGMT STAF EA ADDL: CPT | Mod: S$PBB,,, | Performed by: INTERNAL MEDICINE

## 2022-02-15 DIAGNOSIS — E78.5 HYPERLIPIDEMIA, UNSPECIFIED HYPERLIPIDEMIA TYPE: ICD-10-CM

## 2022-02-16 RX ORDER — LOVASTATIN 40 MG/1
40 TABLET ORAL NIGHTLY
Qty: 90 TABLET | Refills: 3 | Status: SHIPPED | OUTPATIENT
Start: 2022-02-16 | End: 2023-02-16 | Stop reason: SDUPTHER

## 2022-02-18 ENCOUNTER — TELEPHONE (OUTPATIENT)
Dept: INTERNAL MEDICINE | Facility: CLINIC | Age: 77
End: 2022-02-18

## 2022-02-18 NOTE — TELEPHONE ENCOUNTER
"Pt c/o bilateral leg weakness due to "sitting all of the time." Denies any recent falls, but would like a  evaluation if Dr. Atkinson is agreeable.     Maricarmen Zelaya LPN   [facility]   [phone] ext. [phone]     "

## 2022-02-21 RX ORDER — HYDROCORTISONE 25 MG/G
CREAM TOPICAL 2 TIMES DAILY
Qty: 20 G | Refills: 1 | Status: SHIPPED | OUTPATIENT
Start: 2022-02-21 | End: 2022-12-09 | Stop reason: SDUPTHER

## 2022-02-28 ENCOUNTER — EXTERNAL CHRONIC CARE MANAGEMENT (OUTPATIENT)
Dept: PRIMARY CARE CLINIC | Facility: CLINIC | Age: 77
End: 2022-02-28
Payer: MEDICARE

## 2022-02-28 PROCEDURE — 99490 CHRNC CARE MGMT STAFF 1ST 20: CPT | Mod: S$PBB,,, | Performed by: INTERNAL MEDICINE

## 2022-02-28 PROCEDURE — 99439 CHRNC CARE MGMT STAF EA ADDL: CPT | Mod: S$PBB,,, | Performed by: INTERNAL MEDICINE

## 2022-02-28 PROCEDURE — 99490 PR CHRONIC CARE MGMT, 1ST 20 MIN: ICD-10-PCS | Mod: S$PBB,,, | Performed by: INTERNAL MEDICINE

## 2022-02-28 PROCEDURE — 99439 CHRNC CARE MGMT STAF EA ADDL: CPT | Mod: PBBFAC,27,PO | Performed by: INTERNAL MEDICINE

## 2022-02-28 PROCEDURE — 99490 CHRNC CARE MGMT STAFF 1ST 20: CPT | Mod: PBBFAC,25,PO | Performed by: INTERNAL MEDICINE

## 2022-02-28 PROCEDURE — 99439 PR CHRONIC CARE MGMT, EA ADDTL 20 MIN: ICD-10-PCS | Mod: S$PBB,,, | Performed by: INTERNAL MEDICINE

## 2022-03-07 ENCOUNTER — TELEPHONE (OUTPATIENT)
Dept: ELECTROPHYSIOLOGY | Facility: CLINIC | Age: 77
End: 2022-03-07
Payer: MEDICARE

## 2022-03-07 DIAGNOSIS — F32.A DEPRESSION, UNSPECIFIED DEPRESSION TYPE: ICD-10-CM

## 2022-03-07 DIAGNOSIS — I10 ESSENTIAL HYPERTENSION: ICD-10-CM

## 2022-03-07 RX ORDER — HYDROCHLOROTHIAZIDE 25 MG/1
25 TABLET ORAL DAILY
Qty: 90 TABLET | Refills: 3 | Status: SHIPPED | OUTPATIENT
Start: 2022-03-07 | End: 2023-05-10 | Stop reason: SDUPTHER

## 2022-03-07 RX ORDER — FLUOXETINE HYDROCHLORIDE 40 MG/1
40 CAPSULE ORAL DAILY
Qty: 90 CAPSULE | Refills: 3 | Status: SHIPPED | OUTPATIENT
Start: 2022-03-07 | End: 2023-03-16 | Stop reason: SDUPTHER

## 2022-03-08 ENCOUNTER — LAB VISIT (OUTPATIENT)
Dept: LAB | Facility: HOSPITAL | Age: 77
End: 2022-03-08
Attending: INTERNAL MEDICINE
Payer: MEDICARE

## 2022-03-08 ENCOUNTER — OFFICE VISIT (OUTPATIENT)
Dept: ELECTROPHYSIOLOGY | Facility: CLINIC | Age: 77
End: 2022-03-08
Payer: MEDICARE

## 2022-03-08 VITALS
SYSTOLIC BLOOD PRESSURE: 170 MMHG | BODY MASS INDEX: 38.75 KG/M2 | HEIGHT: 63 IN | DIASTOLIC BLOOD PRESSURE: 95 MMHG | HEART RATE: 73 BPM | WEIGHT: 218.69 LBS

## 2022-03-08 DIAGNOSIS — I48.0 PAROXYSMAL ATRIAL FIBRILLATION: Primary | ICD-10-CM

## 2022-03-08 DIAGNOSIS — N18.32 STAGE 3B CHRONIC KIDNEY DISEASE: ICD-10-CM

## 2022-03-08 DIAGNOSIS — E11.9 DM TYPE 2 WITHOUT RETINOPATHY: ICD-10-CM

## 2022-03-08 DIAGNOSIS — I10 ESSENTIAL HYPERTENSION: ICD-10-CM

## 2022-03-08 DIAGNOSIS — I70.0 AORTIC ATHEROSCLEROSIS: ICD-10-CM

## 2022-03-08 DIAGNOSIS — I49.8 OTHER SPECIFIED CARDIAC ARRHYTHMIAS: ICD-10-CM

## 2022-03-08 DIAGNOSIS — I48.0 PAROXYSMAL ATRIAL FIBRILLATION: ICD-10-CM

## 2022-03-08 LAB
BASOPHILS # BLD AUTO: 0.03 K/UL (ref 0–0.2)
BASOPHILS NFR BLD: 0.4 % (ref 0–1.9)
DIFFERENTIAL METHOD: NORMAL
EOSINOPHIL # BLD AUTO: 0.1 K/UL (ref 0–0.5)
EOSINOPHIL NFR BLD: 1.7 % (ref 0–8)
ERYTHROCYTE [DISTWIDTH] IN BLOOD BY AUTOMATED COUNT: 13.8 % (ref 11.5–14.5)
HCT VFR BLD AUTO: 38 % (ref 37–48.5)
HGB BLD-MCNC: 13 G/DL (ref 12–16)
IMM GRANULOCYTES # BLD AUTO: 0.03 K/UL (ref 0–0.04)
IMM GRANULOCYTES NFR BLD AUTO: 0.4 % (ref 0–0.5)
LYMPHOCYTES # BLD AUTO: 1.6 K/UL (ref 1–4.8)
LYMPHOCYTES NFR BLD: 20.5 % (ref 18–48)
MCH RBC QN AUTO: 29.8 PG (ref 27–31)
MCHC RBC AUTO-ENTMCNC: 34.2 G/DL (ref 32–36)
MCV RBC AUTO: 87 FL (ref 82–98)
MONOCYTES # BLD AUTO: 0.4 K/UL (ref 0.3–1)
MONOCYTES NFR BLD: 4.5 % (ref 4–15)
NEUTROPHILS # BLD AUTO: 5.7 K/UL (ref 1.8–7.7)
NEUTROPHILS NFR BLD: 72.5 % (ref 38–73)
NRBC BLD-RTO: 0 /100 WBC
PLATELET # BLD AUTO: 227 K/UL (ref 150–450)
PMV BLD AUTO: 12.6 FL (ref 9.2–12.9)
RBC # BLD AUTO: 4.36 M/UL (ref 4–5.4)
WBC # BLD AUTO: 7.8 K/UL (ref 3.9–12.7)

## 2022-03-08 PROCEDURE — 99999 PR PBB SHADOW E&M-EST. PATIENT-LVL IV: CPT | Mod: PBBFAC,,, | Performed by: INTERNAL MEDICINE

## 2022-03-08 PROCEDURE — 99214 OFFICE O/P EST MOD 30 MIN: CPT | Mod: S$PBB,,, | Performed by: INTERNAL MEDICINE

## 2022-03-08 PROCEDURE — 93010 ELECTROCARDIOGRAM REPORT: CPT | Mod: S$PBB,,, | Performed by: INTERNAL MEDICINE

## 2022-03-08 PROCEDURE — 85025 COMPLETE CBC W/AUTO DIFF WBC: CPT | Performed by: INTERNAL MEDICINE

## 2022-03-08 PROCEDURE — 99999 PR PBB SHADOW E&M-EST. PATIENT-LVL IV: ICD-10-PCS | Mod: PBBFAC,,, | Performed by: INTERNAL MEDICINE

## 2022-03-08 PROCEDURE — 36415 COLL VENOUS BLD VENIPUNCTURE: CPT | Performed by: INTERNAL MEDICINE

## 2022-03-08 PROCEDURE — 99214 PR OFFICE/OUTPT VISIT, EST, LEVL IV, 30-39 MIN: ICD-10-PCS | Mod: S$PBB,,, | Performed by: INTERNAL MEDICINE

## 2022-03-08 PROCEDURE — 93010 RHYTHM STRIP: ICD-10-PCS | Mod: S$PBB,,, | Performed by: INTERNAL MEDICINE

## 2022-03-08 PROCEDURE — 99214 OFFICE O/P EST MOD 30 MIN: CPT | Mod: PBBFAC | Performed by: INTERNAL MEDICINE

## 2022-03-08 PROCEDURE — 93005 ELECTROCARDIOGRAM TRACING: CPT | Mod: PBBFAC | Performed by: INTERNAL MEDICINE

## 2022-03-08 NOTE — PROGRESS NOTES
Subjective:    Patient ID:  Val Bragg is a 76 y.o. female who presents for evaluation of No chief complaint on file.    Referring Cardiologist: Nirav Dyer Jr, MD  Primary Care Physician: Anh Atkinson MD    HPI  Prior Hx:  I had the pleasure of seeing Mrs. Bragg today in our electrophysiology clinic in consultation for her atrial fibrillation. As you are aware she is a pleasant 76 year-old woman with hypertension, chronic back pain, chronic kidney disease stage 3b, and diabetes mellitus type 2. She was undergoing back injections for the past few months. During one visit in May of 2021 she was observed to be in atrial fibrillation. The procedure was cancelled and she was seen by Dr. Dyer. She reported having some increased dyspnea on exertion. There was concern regarding long-term tolerability of anticoagulation due to patient's history of falls as well as her prior episodes of iron deficiency anemia. Repeat lab work noted no current anemia and normal iron studies. She was initiated on renal dosed rivaroxaban. She is referred for further evaluation. She reports continued easy fatigue with exertion and dyspnea that has not changed. She initially thought it could be related to depression since her  passed away, however in retrospect she was tired even before. Since her arrhythmias were asymptomatic we elected for conservative management. We did discuss Watchman implantation.    An echocardiogram from 4/2021 notes normal LV function with normal left atrial size. E and A waves were measured indicating she was in sinus rhythm.    I reviewed available ECGs in Epic which show sinus rhythm with at times sinus bradycardia and prolonged QT interval until 5/2021 which show AF with controlled ventricular response and then an ECG dated 5/11/2021 notes likely accelerated junctional rhythm at a rate of 80 bpm.    Interim Hx:  Mrs. Bragg returns for follow-up. Her only complaints are regarding her back  pain.    My interpretation of today's in clinic electrocardiogram is sinus rhythm with PVCs and a rate of 72 bpm.    Review of Systems   Constitutional: Positive for malaise/fatigue. Negative for fever.   HENT: Negative for congestion and sore throat.    Eyes: Negative for blurred vision and visual disturbance.   Cardiovascular: Positive for dyspnea on exertion. Negative for chest pain, irregular heartbeat, near-syncope, palpitations and syncope.   Respiratory: Negative for cough and shortness of breath.    Hematologic/Lymphatic: Negative for bleeding problem. Does not bruise/bleed easily.   Skin: Negative.    Musculoskeletal: Positive for back pain and falls.   Gastrointestinal: Negative for bloating, abdominal pain, hematochezia and melena.   Neurological: Negative for focal weakness and weakness.   Psychiatric/Behavioral: Negative.         Objective:    Physical Exam  Vitals reviewed.   Constitutional:       General: She is not in acute distress.     Appearance: She is well-developed. She is not diaphoretic.   HENT:      Head: Normocephalic and atraumatic.   Eyes:      General:         Right eye: No discharge.         Left eye: No discharge.      Conjunctiva/sclera: Conjunctivae normal.   Cardiovascular:      Rate and Rhythm: Normal rate and regular rhythm.      Heart sounds: No murmur heard.    No friction rub. No gallop.   Pulmonary:      Effort: Pulmonary effort is normal. No respiratory distress.      Breath sounds: Normal breath sounds. No wheezing or rales.   Abdominal:      General: Bowel sounds are normal. There is no distension.      Palpations: Abdomen is soft.      Tenderness: There is no abdominal tenderness.   Musculoskeletal:      Cervical back: Neck supple.   Skin:     General: Skin is warm and dry.   Neurological:      Mental Status: She is alert and oriented to person, place, and time.   Psychiatric:         Behavior: Behavior normal.         Thought Content: Thought content normal.          Judgment: Judgment normal.           Assessment:       1. Paroxysmal atrial fibrillation    2. Essential hypertension    3. Aortic atherosclerosis    4. Stage 3b chronic kidney disease    5. DM type 2 without retinopathy         Plan:       In summary, Mrs. Bragg  is a pleasant 76year-old woman with hypertension, chronic back pain, chronic kidney disease stage 3b, and diabetes mellitus type 2 presenting for evaluation of newly diagnosed paroxysmal atrial fibrillation. I had a long discussion with the patient about the pathophysiology and risks of atrial fibrillation and its basic pathophysiology, including its health implications and treatment options. Specifically, I addressed the need for CVA (stroke) prophylaxis with aspirin versus oral anticoagulation (warfarin vs DOACs, discussed bleeding risks, and need to come to the ER for any head trauma for CT scanning even if asymptomatic).  Her FODME2CVLn score is 6 and indefinite anticoagulation is indicated. She has been out of AF and in a junctional rhythm vs low septal RA ectopic rhythm (sometimes accelerated in the 80s) for a month and her symptoms have not improved. When in sinus rhythm in the past her sinus rate tends to be slow and her QT interval is long. Together with this and her renal disease, she is a poor anti-arrhythmic drug candidate without a pacemaker. I am not convinced her symptoms are due to this and thus for now do not recommend trial of drug therapy or PPM.     We have previously discussed Watchman implant. She is currently tolerating anticoagulation with no significant falls.    RTC in 1 year, sooner if needed.    Thank you for allowing me to participate in the care of this patient. Please do not hesitate to call me with any questions or concerns.    Vance Hall MD, PhD  Cardiac Electrophysiology

## 2022-03-09 ENCOUNTER — TELEPHONE (OUTPATIENT)
Dept: ELECTROPHYSIOLOGY | Facility: CLINIC | Age: 77
End: 2022-03-09
Payer: MEDICARE

## 2022-03-09 NOTE — TELEPHONE ENCOUNTER
----- Message from Paloma Hudson MA sent at 3/9/2022  8:44 AM CST -----    ----- Message -----  From: Vance Hall MD  Sent: 3/9/2022   7:29 AM CST  To: Rafael DOMINGUEZ Staff    Please notify the patient that her blood count is normal.

## 2022-03-09 NOTE — TELEPHONE ENCOUNTER
Attempted to contact patient to discuss lab results, but no answer received. Voicemail left advising patient that Dr Hall has reviewed her lab results which showed that her blood count is normal. Instructed to call office back if she has any further questions regarding information provided.

## 2022-03-10 ENCOUNTER — TELEPHONE (OUTPATIENT)
Dept: ELECTROPHYSIOLOGY | Facility: CLINIC | Age: 77
End: 2022-03-10
Payer: MEDICARE

## 2022-03-24 ENCOUNTER — TELEPHONE (OUTPATIENT)
Dept: INTERNAL MEDICINE | Facility: CLINIC | Age: 77
End: 2022-03-24

## 2022-03-24 NOTE — TELEPHONE ENCOUNTER
I wanted to follow up on the HH request sent over last month.     I spoke with pt today and she reports continued lower back pain and not being able to ambulate as usual. Denies any falls. C/o dizziness, but states she is not checking BP or BG regularly.     Pt also reports feeling down due to pt's 's and sister's recent passing. I attempted to complete a depression screen on pt, but pt declined at this time.     I did schedule pt to be seen with Dr. Atkinson for the first available appointment on 7/8/22.     Pt reports having HH in the past and would like to be evaluated if Dr. Atkinson is agreeable.     Please advise.     Thank you,     Maricarmen Hollingsworth LPN   [facility]   [phone] ext. [phone]

## 2022-03-31 ENCOUNTER — EXTERNAL CHRONIC CARE MANAGEMENT (OUTPATIENT)
Dept: PRIMARY CARE CLINIC | Facility: CLINIC | Age: 77
End: 2022-03-31
Payer: MEDICARE

## 2022-03-31 PROCEDURE — 99439 PR CHRONIC CARE MGMT, EA ADDTL 20 MIN: ICD-10-PCS | Mod: S$PBB,,, | Performed by: INTERNAL MEDICINE

## 2022-03-31 PROCEDURE — 99490 CHRNC CARE MGMT STAFF 1ST 20: CPT | Mod: PBBFAC,PO | Performed by: INTERNAL MEDICINE

## 2022-03-31 PROCEDURE — 99490 CHRNC CARE MGMT STAFF 1ST 20: CPT | Mod: S$PBB,,, | Performed by: INTERNAL MEDICINE

## 2022-03-31 PROCEDURE — 99490 PR CHRONIC CARE MGMT, 1ST 20 MIN: ICD-10-PCS | Mod: S$PBB,,, | Performed by: INTERNAL MEDICINE

## 2022-03-31 PROCEDURE — 99439 CHRNC CARE MGMT STAF EA ADDL: CPT | Mod: S$PBB,,, | Performed by: INTERNAL MEDICINE

## 2022-03-31 PROCEDURE — 99439 CHRNC CARE MGMT STAF EA ADDL: CPT | Mod: PBBFAC,PO | Performed by: INTERNAL MEDICINE

## 2022-04-11 ENCOUNTER — TELEPHONE (OUTPATIENT)
Dept: INTERNAL MEDICINE | Facility: CLINIC | Age: 77
End: 2022-04-11
Payer: MEDICARE

## 2022-04-11 NOTE — TELEPHONE ENCOUNTER
----- Message from Ligia Woodard sent at 4/11/2022  2:31 PM CDT -----  Regarding: advice & order  Contact: Guido Hernadez [phone] ext [phone]  Following up on faxed requested of 3- for Home Health evaluation and order.   Please call and advise

## 2022-04-19 RX ORDER — NIFEDIPINE 90 MG/1
90 TABLET, EXTENDED RELEASE ORAL DAILY
Qty: 90 TABLET | Refills: 3 | Status: SHIPPED | OUTPATIENT
Start: 2022-04-19 | End: 2023-05-01 | Stop reason: SDUPTHER

## 2022-04-30 ENCOUNTER — EXTERNAL CHRONIC CARE MANAGEMENT (OUTPATIENT)
Dept: PRIMARY CARE CLINIC | Facility: CLINIC | Age: 77
End: 2022-04-30
Payer: MEDICARE

## 2022-04-30 PROCEDURE — 99490 CHRNC CARE MGMT STAFF 1ST 20: CPT | Mod: S$PBB,,, | Performed by: INTERNAL MEDICINE

## 2022-04-30 PROCEDURE — 99490 CHRNC CARE MGMT STAFF 1ST 20: CPT | Mod: PBBFAC,PO | Performed by: INTERNAL MEDICINE

## 2022-04-30 PROCEDURE — 99490 PR CHRONIC CARE MGMT, 1ST 20 MIN: ICD-10-PCS | Mod: S$PBB,,, | Performed by: INTERNAL MEDICINE

## 2022-05-10 RX ORDER — LOSARTAN POTASSIUM 50 MG/1
50 TABLET ORAL DAILY
Qty: 30 TABLET | Refills: 3 | Status: SHIPPED | OUTPATIENT
Start: 2022-05-10 | End: 2022-09-16 | Stop reason: SDUPTHER

## 2022-05-11 ENCOUNTER — PES CALL (OUTPATIENT)
Dept: ADMINISTRATIVE | Facility: CLINIC | Age: 77
End: 2022-05-11
Payer: MEDICARE

## 2022-05-23 DIAGNOSIS — G47.00 INSOMNIA, UNSPECIFIED TYPE: ICD-10-CM

## 2022-05-24 RX ORDER — TRAZODONE HYDROCHLORIDE 150 MG/1
TABLET ORAL
Qty: 90 TABLET | Refills: 3 | Status: SHIPPED | OUTPATIENT
Start: 2022-05-24 | End: 2023-05-18 | Stop reason: SDUPTHER

## 2022-05-24 RX ORDER — DIAZEPAM 5 MG/1
5 TABLET ORAL NIGHTLY
Qty: 30 TABLET | Refills: 3 | Status: SHIPPED | OUTPATIENT
Start: 2022-05-24 | End: 2022-09-06 | Stop reason: SDUPTHER

## 2022-05-29 DIAGNOSIS — M41.9 SCOLIOSIS OF LUMBAR SPINE, UNSPECIFIED SCOLIOSIS TYPE: ICD-10-CM

## 2022-05-30 ENCOUNTER — PES CALL (OUTPATIENT)
Dept: ADMINISTRATIVE | Facility: CLINIC | Age: 77
End: 2022-05-30
Payer: MEDICARE

## 2022-05-30 RX ORDER — BACLOFEN 10 MG/1
TABLET ORAL
Qty: 90 TABLET | Refills: 3 | Status: SHIPPED | OUTPATIENT
Start: 2022-05-30 | End: 2022-11-06 | Stop reason: SDUPTHER

## 2022-05-31 ENCOUNTER — EXTERNAL CHRONIC CARE MANAGEMENT (OUTPATIENT)
Dept: PRIMARY CARE CLINIC | Facility: CLINIC | Age: 77
End: 2022-05-31
Payer: MEDICARE

## 2022-05-31 PROCEDURE — 99490 PR CHRONIC CARE MGMT, 1ST 20 MIN: ICD-10-PCS | Mod: S$PBB,,, | Performed by: INTERNAL MEDICINE

## 2022-05-31 PROCEDURE — 99490 CHRNC CARE MGMT STAFF 1ST 20: CPT | Mod: S$PBB,,, | Performed by: INTERNAL MEDICINE

## 2022-05-31 PROCEDURE — 99490 CHRNC CARE MGMT STAFF 1ST 20: CPT | Mod: PBBFAC,PO | Performed by: INTERNAL MEDICINE

## 2022-06-28 DIAGNOSIS — E11.9 DM TYPE 2 WITHOUT RETINOPATHY: ICD-10-CM

## 2022-06-29 RX ORDER — METFORMIN HYDROCHLORIDE 500 MG/1
500 TABLET ORAL
Qty: 90 TABLET | Refills: 3 | Status: SHIPPED | OUTPATIENT
Start: 2022-06-29 | End: 2023-12-22

## 2022-06-30 ENCOUNTER — EXTERNAL CHRONIC CARE MANAGEMENT (OUTPATIENT)
Dept: PRIMARY CARE CLINIC | Facility: CLINIC | Age: 77
End: 2022-06-30
Payer: MEDICARE

## 2022-06-30 PROCEDURE — 99490 PR CHRONIC CARE MGMT, 1ST 20 MIN: ICD-10-PCS | Mod: S$PBB,,, | Performed by: INTERNAL MEDICINE

## 2022-06-30 PROCEDURE — 99439 CHRNC CARE MGMT STAF EA ADDL: CPT | Mod: PBBFAC,PO | Performed by: INTERNAL MEDICINE

## 2022-06-30 PROCEDURE — 99439 CHRNC CARE MGMT STAF EA ADDL: CPT | Mod: S$PBB,,, | Performed by: INTERNAL MEDICINE

## 2022-06-30 PROCEDURE — 99490 CHRNC CARE MGMT STAFF 1ST 20: CPT | Mod: S$PBB,,, | Performed by: INTERNAL MEDICINE

## 2022-06-30 PROCEDURE — 99490 CHRNC CARE MGMT STAFF 1ST 20: CPT | Mod: PBBFAC,PO | Performed by: INTERNAL MEDICINE

## 2022-06-30 PROCEDURE — 99439 PR CHRONIC CARE MGMT, EA ADDTL 20 MIN: ICD-10-PCS | Mod: S$PBB,,, | Performed by: INTERNAL MEDICINE

## 2022-07-08 ENCOUNTER — PES CALL (OUTPATIENT)
Dept: ADMINISTRATIVE | Facility: CLINIC | Age: 77
End: 2022-07-08
Payer: MEDICARE

## 2022-07-08 ENCOUNTER — OFFICE VISIT (OUTPATIENT)
Dept: INTERNAL MEDICINE | Facility: CLINIC | Age: 77
End: 2022-07-08
Payer: MEDICARE

## 2022-07-08 ENCOUNTER — LAB VISIT (OUTPATIENT)
Dept: LAB | Facility: HOSPITAL | Age: 77
End: 2022-07-08
Attending: INTERNAL MEDICINE
Payer: MEDICARE

## 2022-07-08 VITALS
HEIGHT: 64 IN | WEIGHT: 211.13 LBS | BODY MASS INDEX: 36.05 KG/M2 | OXYGEN SATURATION: 96 % | DIASTOLIC BLOOD PRESSURE: 78 MMHG | HEART RATE: 83 BPM | SYSTOLIC BLOOD PRESSURE: 144 MMHG | RESPIRATION RATE: 18 BRPM | TEMPERATURE: 97 F

## 2022-07-08 DIAGNOSIS — F43.21 GRIEF REACTION: ICD-10-CM

## 2022-07-08 DIAGNOSIS — R60.9 EDEMA, UNSPECIFIED TYPE: ICD-10-CM

## 2022-07-08 DIAGNOSIS — E11.9 DM TYPE 2 WITHOUT RETINOPATHY: Primary | ICD-10-CM

## 2022-07-08 DIAGNOSIS — E11.9 DM TYPE 2 WITHOUT RETINOPATHY: ICD-10-CM

## 2022-07-08 DIAGNOSIS — I10 ESSENTIAL HYPERTENSION: ICD-10-CM

## 2022-07-08 DIAGNOSIS — N18.32 STAGE 3B CHRONIC KIDNEY DISEASE: ICD-10-CM

## 2022-07-08 LAB
ANION GAP SERPL CALC-SCNC: 14 MMOL/L (ref 8–16)
BUN SERPL-MCNC: 18 MG/DL (ref 8–23)
CALCIUM SERPL-MCNC: 10.1 MG/DL (ref 8.7–10.5)
CHLORIDE SERPL-SCNC: 101 MMOL/L (ref 95–110)
CO2 SERPL-SCNC: 26 MMOL/L (ref 23–29)
CREAT SERPL-MCNC: 2 MG/DL (ref 0.5–1.4)
EST. GFR  (AFRICAN AMERICAN): 27.2 ML/MIN/1.73 M^2
EST. GFR  (NON AFRICAN AMERICAN): 23.6 ML/MIN/1.73 M^2
ESTIMATED AVG GLUCOSE: 160 MG/DL (ref 68–131)
GLUCOSE SERPL-MCNC: 190 MG/DL (ref 70–110)
HBA1C MFR BLD: 7.2 % (ref 4–5.6)
POTASSIUM SERPL-SCNC: 3.8 MMOL/L (ref 3.5–5.1)
SODIUM SERPL-SCNC: 141 MMOL/L (ref 136–145)

## 2022-07-08 PROCEDURE — 83036 HEMOGLOBIN GLYCOSYLATED A1C: CPT | Performed by: INTERNAL MEDICINE

## 2022-07-08 PROCEDURE — 99215 OFFICE O/P EST HI 40 MIN: CPT | Mod: PBBFAC,PO | Performed by: INTERNAL MEDICINE

## 2022-07-08 PROCEDURE — 80048 BASIC METABOLIC PNL TOTAL CA: CPT | Performed by: INTERNAL MEDICINE

## 2022-07-08 PROCEDURE — 99999 PR PBB SHADOW E&M-EST. PATIENT-LVL V: CPT | Mod: PBBFAC,,, | Performed by: INTERNAL MEDICINE

## 2022-07-08 PROCEDURE — 36415 COLL VENOUS BLD VENIPUNCTURE: CPT | Mod: PO | Performed by: INTERNAL MEDICINE

## 2022-07-08 PROCEDURE — 99999 PR PBB SHADOW E&M-EST. PATIENT-LVL V: ICD-10-PCS | Mod: PBBFAC,,, | Performed by: INTERNAL MEDICINE

## 2022-07-08 PROCEDURE — 99213 OFFICE O/P EST LOW 20 MIN: CPT | Mod: S$PBB,,, | Performed by: INTERNAL MEDICINE

## 2022-07-08 PROCEDURE — 99213 PR OFFICE/OUTPT VISIT, EST, LEVL III, 20-29 MIN: ICD-10-PCS | Mod: S$PBB,,, | Performed by: INTERNAL MEDICINE

## 2022-07-08 NOTE — PROGRESS NOTES
CC: followup of hypertension and diabetes  HPI:  The patient is a 76 y.o. year old female who presents to the office for followup of hypertension and diabetes.  The patient denies any chest pain, headache, blurred vision, nausea or vomiting, but reports shortness of breath with activity and fatigue.  She has been having a lot of difficulty since the death of her .  She has been feeling overwhelmed.   The patient reports tooth loss and swelling of her ankles.    PAST MEDICAL HISTORY:  Past Medical History:   Diagnosis Date    Alternating exotropia 4/18/2013    GERD (gastroesophageal reflux disease)     Hyperlipidemia     Lumbar spondylosis 4/16/2021    Menopausal state     Multiple gastric polyps     on EGD in 2004    Sickle cell trait        SURGICAL HISTORY:  Past Surgical History:   Procedure Laterality Date    CATARACT EXTRACTION W/  INTRAOCULAR LENS IMPLANT Left 06/05/2018    Dr. Castillo    CHOLECYSTECTOMY      laproscopic cholecystectomy    COLONOSCOPY N/A 8/13/2019    Procedure: COLONOSCOPY;  Surgeon: Jeffrey Maynard MD;  Location: [address]);  Service: Endoscopy;  Laterality: N/A;    ESOPHAGOGASTRODUODENOSCOPY N/A 8/13/2019    Procedure: ESOPHAGOGASTRODUODENOSCOPY (EGD);  Surgeon: Jeffrey Maynard MD;  Location: [address]);  Service: Endoscopy;  Laterality: N/A;    HYSTERECTOMY      DENISHA/BSO - fibroids    INJECTION OF ANESTHETIC AGENT AROUND MEDIAL BRANCH NERVES INNERVATING LUMBAR FACET JOINT Bilateral 5/27/2021    Procedure: Block-nerve-medial branch-lumbar--Bilateral L4-5, L5-S1;  Surgeon: Ashish Ren Jr., MD;  Location: [facility];  Service: Pain Management;  Laterality: Bilateral;    INJECTION OF ANESTHETIC AGENT AROUND MEDIAL BRANCH NERVES INNERVATING LUMBAR FACET JOINT Bilateral 6/10/2021    Procedure: Block-nerve-medial branch-lumbar--bilateral-- L4-5, L5-S1;  Surgeon: Ashish Ren Jr., MD;  Location: [facility];  Service: Pain Management;  Laterality:  Bilateral;  patient is diabetic    INTRAOCULAR PROSTHESES INSERTION Left 6/5/2018    Procedure: INSERTION-INTRAOCULAR LENS (IOL);  Surgeon: Ga Castillo MD;  Location: [facility] OR [address] [place] [address];  Service: Ophthalmology;  Laterality: Left;    Laparoscopic right hemicolectomy      OOPHORECTOMY      PHACOEMULSIFICATION OF CATARACT Left 6/5/2018    Procedure: PHACOEMULSIFICATION-ASPIRATION-CATARACT;  Surgeon: Ga Castillo MD;  Location: [facility] OR [address] [place] [address];  Service: Ophthalmology;  Laterality: Left;       MEDS:  Medcard reviewed and updated    ALLERGIES: Allergy Card reviewed and updated    SOCIAL HISTORY:   The patient is a nonsmoker.    PE:   APPEARANCE: Well nourished, well developed, in no acute distress.    CHEST: Lungs clear to auscultation with unlabored respirations.  CARDIOVASCULAR: Normal S1, S2. No murmurs. No carotid bruits. Positive bilateral pedal edema.  ABDOMEN: Bowel sounds normal. Not distended. Soft. No tenderness or masses. .  MUSCULOSKELETAL:  Abnormal gait, ambulates with a walker.  PSYCHIATRIC: The patient is oriented to person, place, and time and has a pleasant affect.        ASSESSMENT/PLAN:  Val was seen today for follow-up, low-back pain, fatigue and extremity weakness.    Diagnoses and all orders for this visit:    DM type 2 without retinopathy  -     Basic Metabolic Panel; Future  -     Hemoglobin A1C; Future  -     Ambulatory referral/consult to Home Health; Future    Essential hypertension  -     Ambulatory referral/consult to Home Health; Future  -     blood pressure is mildly elevated, monitor    Stage 3b chronic kidney disease    Grief reaction  -     Ambulatory referral/consult to Psychiatry; Future    Edema, unspecified type  -     Ambulatory referral/consult to Home Health; Future

## 2022-07-18 PROCEDURE — G0180 PR HOME HEALTH MD CERTIFICATION: ICD-10-PCS | Mod: ,,, | Performed by: INTERNAL MEDICINE

## 2022-07-18 PROCEDURE — G0180 MD CERTIFICATION HHA PATIENT: HCPCS | Mod: ,,, | Performed by: INTERNAL MEDICINE

## 2022-07-29 ENCOUNTER — PES CALL (OUTPATIENT)
Dept: ADMINISTRATIVE | Facility: CLINIC | Age: 77
End: 2022-07-29
Payer: MEDICARE

## 2022-07-31 ENCOUNTER — EXTERNAL CHRONIC CARE MANAGEMENT (OUTPATIENT)
Dept: PRIMARY CARE CLINIC | Facility: CLINIC | Age: 77
End: 2022-07-31
Payer: MEDICARE

## 2022-07-31 PROCEDURE — 99490 PR CHRONIC CARE MGMT, 1ST 20 MIN: ICD-10-PCS | Mod: S$PBB,,, | Performed by: INTERNAL MEDICINE

## 2022-07-31 PROCEDURE — 99490 CHRNC CARE MGMT STAFF 1ST 20: CPT | Mod: PBBFAC,PO | Performed by: INTERNAL MEDICINE

## 2022-07-31 PROCEDURE — 99490 CHRNC CARE MGMT STAFF 1ST 20: CPT | Mod: S$PBB,,, | Performed by: INTERNAL MEDICINE

## 2022-07-31 PROCEDURE — 99439 CHRNC CARE MGMT STAF EA ADDL: CPT | Mod: S$PBB,,, | Performed by: INTERNAL MEDICINE

## 2022-07-31 PROCEDURE — 99439 PR CHRONIC CARE MGMT, EA ADDTL 20 MIN: ICD-10-PCS | Mod: S$PBB,,, | Performed by: INTERNAL MEDICINE

## 2022-07-31 PROCEDURE — 99439 CHRNC CARE MGMT STAF EA ADDL: CPT | Mod: PBBFAC,PO | Performed by: INTERNAL MEDICINE

## 2022-08-01 ENCOUNTER — PES CALL (OUTPATIENT)
Dept: ADMINISTRATIVE | Facility: CLINIC | Age: 77
End: 2022-08-01
Payer: MEDICARE

## 2022-08-04 ENCOUNTER — TELEPHONE (OUTPATIENT)
Dept: INTERNAL MEDICINE | Facility: CLINIC | Age: 77
End: 2022-08-04
Payer: MEDICARE

## 2022-08-04 DIAGNOSIS — M41.9 SCOLIOSIS OF LUMBAR SPINE, UNSPECIFIED SCOLIOSIS TYPE: ICD-10-CM

## 2022-08-04 NOTE — TELEPHONE ENCOUNTER
----- Message from Ginette Antunez sent at 8/4/2022 10:49 AM CDT -----  Contact: Radha with [facility]   Radha states the pt is having back pain of a level 8. Radha states to reach out to the pt at .

## 2022-08-04 NOTE — TELEPHONE ENCOUNTER
Called patient who reports worsening pain and difficulty standing for prolonged periods of time. Injections for back pain or discontinued once patient was found to be in atrial fibrillation.  She reports current pain medication is ineffective.  Recommend referral to pain management again.

## 2022-08-05 ENCOUNTER — PATIENT MESSAGE (OUTPATIENT)
Dept: PAIN MEDICINE | Facility: CLINIC | Age: 77
End: 2022-08-05

## 2022-08-08 ENCOUNTER — PES CALL (OUTPATIENT)
Dept: ADMINISTRATIVE | Facility: CLINIC | Age: 77
End: 2022-08-08
Payer: MEDICARE

## 2022-08-10 DIAGNOSIS — N18.32 STAGE 3B CHRONIC KIDNEY DISEASE: ICD-10-CM

## 2022-08-10 DIAGNOSIS — I48.0 PAROXYSMAL ATRIAL FIBRILLATION: ICD-10-CM

## 2022-08-17 ENCOUNTER — EXTERNAL HOME HEALTH (OUTPATIENT)
Dept: HOME HEALTH SERVICES | Facility: HOSPITAL | Age: 77
End: 2022-08-17
Payer: MEDICARE

## 2022-08-19 ENCOUNTER — PES CALL (OUTPATIENT)
Dept: ADMINISTRATIVE | Facility: CLINIC | Age: 77
End: 2022-08-19
Payer: MEDICARE

## 2022-08-22 ENCOUNTER — TELEPHONE (OUTPATIENT)
Dept: INTERNAL MEDICINE | Facility: CLINIC | Age: 77
End: 2022-08-22
Payer: MEDICARE

## 2022-08-22 NOTE — TELEPHONE ENCOUNTER
----- Message from Rashida Corcoran sent at 8/22/2022  3:26 PM CDT -----  Contact: Garret dao/ [facility]    Type: Orders Request    What orders/ testing are being requested? At home PT more visits    Is there a future appointment scheduled for the patient with PCP?No    When?    Would you prefer a response via [facility]? Call Back    Comments:Pt needs more visits with PT w/ [facility]

## 2022-08-24 NOTE — TELEPHONE ENCOUNTER
----- Message from Rashida Corcoran sent at 8/23/2022  3:20 PM CDT -----  Contact: Garret Black   Patient is returning a phone call.  Who left a message for the patient: Manuel  Does patient know what this is regarding: regarding the pt and a message from yesterday   Would you like a call back, or a response through your [facility] portal?:   Call Back  Comments:

## 2022-08-26 ENCOUNTER — TELEPHONE (OUTPATIENT)
Dept: ADMINISTRATIVE | Facility: CLINIC | Age: 77
End: 2022-08-26
Payer: MEDICARE

## 2022-08-29 ENCOUNTER — OFFICE VISIT (OUTPATIENT)
Dept: FAMILY MEDICINE | Facility: CLINIC | Age: 77
End: 2022-08-29
Payer: MEDICARE

## 2022-08-29 ENCOUNTER — TELEPHONE (OUTPATIENT)
Dept: ADMINISTRATIVE | Facility: CLINIC | Age: 77
End: 2022-08-29
Payer: MEDICARE

## 2022-08-29 VITALS — WEIGHT: 211 LBS | HEIGHT: 64 IN | BODY MASS INDEX: 36.02 KG/M2

## 2022-08-29 DIAGNOSIS — H25.11 NUCLEAR SCLEROSIS OF RIGHT EYE: ICD-10-CM

## 2022-08-29 DIAGNOSIS — Z00.00 ENCOUNTER FOR PREVENTIVE HEALTH EXAMINATION: Primary | ICD-10-CM

## 2022-08-29 DIAGNOSIS — K21.9 GASTROESOPHAGEAL REFLUX DISEASE, UNSPECIFIED WHETHER ESOPHAGITIS PRESENT: ICD-10-CM

## 2022-08-29 DIAGNOSIS — G89.29 CHRONIC BILATERAL LOW BACK PAIN WITHOUT SCIATICA: ICD-10-CM

## 2022-08-29 DIAGNOSIS — I70.0 AORTIC ATHEROSCLEROSIS: ICD-10-CM

## 2022-08-29 DIAGNOSIS — G89.29 OTHER CHRONIC PAIN: ICD-10-CM

## 2022-08-29 DIAGNOSIS — H25.10 NUCLEAR SENILE CATARACT, UNSPECIFIED LATERALITY: ICD-10-CM

## 2022-08-29 DIAGNOSIS — E11.9 DM TYPE 2 WITHOUT RETINOPATHY: ICD-10-CM

## 2022-08-29 DIAGNOSIS — D57.3 SICKLE CELL TRAIT: ICD-10-CM

## 2022-08-29 DIAGNOSIS — Z99.89 DEPENDENCE ON OTHER ENABLING MACHINES AND DEVICES: ICD-10-CM

## 2022-08-29 DIAGNOSIS — R26.89 BALANCE PROBLEMS: ICD-10-CM

## 2022-08-29 DIAGNOSIS — I10 ESSENTIAL HYPERTENSION: ICD-10-CM

## 2022-08-29 DIAGNOSIS — H52.7 REFRACTIVE ERROR: ICD-10-CM

## 2022-08-29 DIAGNOSIS — R60.0 PERIPHERAL EDEMA: ICD-10-CM

## 2022-08-29 DIAGNOSIS — M54.50 CHRONIC BILATERAL LOW BACK PAIN WITHOUT SCIATICA: ICD-10-CM

## 2022-08-29 DIAGNOSIS — J31.0 CHRONIC RHINITIS: ICD-10-CM

## 2022-08-29 DIAGNOSIS — F43.21 GRIEF: ICD-10-CM

## 2022-08-29 DIAGNOSIS — D50.0 IRON DEFICIENCY ANEMIA DUE TO CHRONIC BLOOD LOSS: ICD-10-CM

## 2022-08-29 DIAGNOSIS — E78.00 PURE HYPERCHOLESTEROLEMIA: ICD-10-CM

## 2022-08-29 DIAGNOSIS — N18.32 STAGE 3B CHRONIC KIDNEY DISEASE: ICD-10-CM

## 2022-08-29 DIAGNOSIS — R26.9 ABNORMALITY OF GAIT AND MOBILITY: ICD-10-CM

## 2022-08-29 DIAGNOSIS — I48.0 PAROXYSMAL ATRIAL FIBRILLATION: ICD-10-CM

## 2022-08-29 DIAGNOSIS — H50.15 ALTERNATING EXOTROPIA: ICD-10-CM

## 2022-08-29 DIAGNOSIS — M47.816 LUMBAR SPONDYLOSIS: ICD-10-CM

## 2022-08-29 PROBLEM — J18.9 PNEUMONIA OF RIGHT MIDDLE LOBE DUE TO INFECTIOUS ORGANISM: Status: RESOLVED | Noted: 2019-03-19 | Resolved: 2022-08-29

## 2022-08-29 PROBLEM — J18.9 RIGHT MIDDLE LOBE PNEUMONIA: Status: RESOLVED | Noted: 2019-03-19 | Resolved: 2022-08-29

## 2022-08-29 PROBLEM — K63.5 COLON POLYPS: Status: RESOLVED | Noted: 2019-08-13 | Resolved: 2022-08-29

## 2022-08-29 PROCEDURE — G0439 PR MEDICARE ANNUAL WELLNESS SUBSEQUENT VISIT: ICD-10-PCS | Mod: 95,,,

## 2022-08-29 PROCEDURE — G0439 PPPS, SUBSEQ VISIT: HCPCS | Mod: 95,,,

## 2022-08-29 NOTE — PROGRESS NOTES
"  The patient location is: [place]  The chief complaint leading to consultation is: Medicare AWV     Visit type: audiovisual    Face to Face time with patient: 60 minutes of total time spent on the encounter, which includes face to face time and non-face to face time preparing to see the patient (eg, review of tests), Obtaining and/or reviewing separately obtained history, Documenting clinical information in the electronic or other health record, Independently interpreting results (not separately reported) and communicating results to the patient/family/caregiver, or Care coordination (not separately reported).         Each patient to whom he or she provides medical services by telemedicine is:  (1) informed of the relationship between the physician and patient and the respective role of any other health care provider with respect to management of the patient; and (2) notified that he or she may decline to receive medical services by telemedicine and may withdraw from such care at any time.    Notes:       Val Bragg presented for a  Medicare AWV and comprehensive Health Risk Assessment today. The following components were reviewed and updated:    Medical history  Family History  Social history  Allergies and Current Medications  Health Risk Assessment  Health Maintenance  Care Team         ** See Completed Assessments for Annual Wellness Visit within the encounter summary.**         The following assessments were completed:  Living Situation  CAGE  Depression Screening  Fall Risk Assessment (MACH 10)  Hearing Assessment(HHI)  Cognitive Function Screening  Nutrition Screening  ADL Screening  PAQ Screening      Vitals:    08/29/22 1330   Weight: 95.7 kg (211 lb)   Height: 5' 4" (1.626 m)     Body mass index is 36.22 kg/m².  Physical Exam  Constitutional:       General: She is not in acute distress.     Appearance: Normal appearance. She is well-developed and well-groomed.   Skin:     Coloration: Skin is not " pale.   Neurological:      Mental Status: She is alert and oriented to person, place, and time.   Psychiatric:         Attention and Perception: Attention normal.         Mood and Affect: Mood is depressed.         Speech: Speech normal.         Behavior: Behavior is cooperative.             Diagnoses and health risks identified today and associated recommendations/orders:    1. Encounter for preventive health examination  2. Aortic atherosclerosis  Chronic; stable on medication. Followed by Cardiology.    3. DM type 2 without retinopathy  Chronic; stable on medication. Followed by PCP    4. Paroxysmal atrial fibrillation  Chronic; stable on medication. Followed by Cardiology.    5. Stage 3b chronic kidney disease  Chronic. Stable. Followed by PCP.     6. Sickle cell trait  Chronic. Followed by PCP.     7. Lumbar spondylosis  8. Other chronic pain  Chronic, uncontrolled. Followed by Pain Management. Scheduled to see pain management on Friday.     9. Alternating exotropia  10. Nuclear sclerosis of right eye  11. Refractive error  12. Nuclear senile cataract, unspecified laterality  Chronic, stable. Followed by Ophthalmology.     13. Chronic rhinitis  14. Essential hypertension  15. Pure hypercholesterolemia  Chronic; stable on medication. Followed by PCP    16. Gastroesophageal reflux disease, unspecified whether esophagitis present  Chronic; stable on medication. Followed by PCP    17. Balance problems  18. Peripheral edema  19. Abnormality of gait and mobility  Chronic. Continue to ambulate with cane and walker. Declines PT at this time. Followed by PCP.     20. Grief  PHQ-9 score 14. Patient reports losing her  of > 50 years  1.5 years ago and is having a hard time. States she feels like she is not grieving properly due to medications. Agrees she should talk with someone, referral placed for psychology. Advised patient someone will call her to set up appointment.   - Ambulatory referral/consult to  Psychology; Future    21. Iron deficiency anemia due to chronic blood loss  Chronic. Stable, followed by PCP.     22. Chronic bilateral low back pain without sciatica  Chronic; uncontrolled on medication. Followed by Pain management. Scheduled to see pain management on Friday.     23. Dependence on other enabling machines and devices  Chronic. Followed by PCP.       Provided Val with a 5-10 year written screening schedule and personal prevention plan. Recommendations were developed using the USPSTF age appropriate recommendations. Education, counseling, and referrals were provided as needed. After Visit Summary printed and given to patient which includes a list of additional screenings\tests needed.    Follow up in about 1 year (around 8/29/2023) for your next annual wellness visit.    Laure Smith, KRYSTIAN        Advance Care Planning       I offered to discuss advanced care planning, including how to pick a person who would make decisions for you if you were unable to make them for yourself, called a health care power of , and what kind of decisions you might make such as use of life sustaining treatments such as ventilators and tube feeding when faced with a life limiting illness recorded on a living will that they will need to know. (How you want to be cared for as you near the end of your natural life)     X Patient is interested in learning more about how to make advanced directives.  I provided them paperwork and offered to discuss this with them.

## 2022-08-29 NOTE — PATIENT INSTRUCTIONS
Counseling and Referral of Other Preventative  (Italic type indicates deductible and co-insurance are waived)    Patient Name: Val Bragg  Today's Date: 8/29/2022    Health Maintenance       Date Due Completion Date    Hepatitis C Screening 11/30/2022 (Originally 1945) ---    TETANUS VACCINE 11/30/2022 (Originally 11/24/1963) ---    Shingles Vaccine (2 of 2) 11/30/2022 (Originally 6/10/2019) 4/15/2019    COVID-19 Vaccine (4 - Booster for Pfizer series) 11/30/2022 (Originally 12/25/2021) 10/2/2021    Foot Exam 11/30/2022 (Originally 8/9/2022) 8/9/2021    Eye Exam 11/30/2022 (Originally 4/5/2020) 4/5/2019    Influenza Vaccine (1) 09/01/2022 1/19/2022    Hemoglobin A1c 01/08/2023 7/8/2022    DEXA Scan 01/15/2023 1/15/2019    Diabetes Urine Screening 01/19/2023 1/19/2022    Lipid Panel 01/19/2023 1/19/2022        No orders of the defined types were placed in this encounter.    The following information is provided to all patients.  This information is to help you find resources for any of the problems found today that may be affecting your health:                Living healthy guide: www.[facility].[place].gov      Understanding Diabetes: www.diabetes.org      Eating healthy: www.cdc.gov/healthyweight      CDC home safety checklist: www.cdc.gov/steadi/patient.html      Agency on Aging: www.goea.[place].gov      Alcoholics anonymous (AA): www.aa.org      Physical Activity: www.rasta.nih.gov/uc8nomx      Tobacco use: www.quitwithusla.org

## 2022-08-29 NOTE — Clinical Note
Good afternoon Dr. Atkinson, I had a virtual medicare AWV with Mrs. Bragg today and her PHQ-9 score was 14. She states she is still having a hard time grieving her  of >50 years and feels as thought she cannot properly grieve due to her medicine (fluoxetine). I placed an order for psych just for her to talk to someone, she was open to talking and agreed to the referral.  Just wanted to let you know. I cc'd you on the referral. If you have any questions, please let me know.   Thank you, Laure Smith NP

## 2022-08-31 ENCOUNTER — EXTERNAL CHRONIC CARE MANAGEMENT (OUTPATIENT)
Dept: PRIMARY CARE CLINIC | Facility: CLINIC | Age: 77
End: 2022-08-31
Payer: MEDICARE

## 2022-08-31 PROCEDURE — 99490 PR CHRONIC CARE MGMT, 1ST 20 MIN: ICD-10-PCS | Mod: S$PBB,,, | Performed by: INTERNAL MEDICINE

## 2022-08-31 PROCEDURE — 99490 CHRNC CARE MGMT STAFF 1ST 20: CPT | Mod: S$PBB,,, | Performed by: INTERNAL MEDICINE

## 2022-08-31 PROCEDURE — 99490 CHRNC CARE MGMT STAFF 1ST 20: CPT | Mod: PBBFAC,PO | Performed by: INTERNAL MEDICINE

## 2022-09-01 DIAGNOSIS — I10 ESSENTIAL HYPERTENSION: ICD-10-CM

## 2022-09-01 DIAGNOSIS — I48.0 PAROXYSMAL ATRIAL FIBRILLATION: ICD-10-CM

## 2022-09-01 RX ORDER — FLASH GLUCOSE SENSOR
1 KIT MISCELLANEOUS
Qty: 2 KIT | Refills: 11 | Status: SHIPPED | OUTPATIENT
Start: 2022-09-01 | End: 2023-09-20 | Stop reason: SDUPTHER

## 2022-09-01 RX ORDER — METOPROLOL SUCCINATE 25 MG/1
25 TABLET, EXTENDED RELEASE ORAL DAILY
Qty: 30 TABLET | Refills: 11 | Status: SHIPPED | OUTPATIENT
Start: 2022-09-01 | End: 2023-09-02 | Stop reason: SDUPTHER

## 2022-09-02 ENCOUNTER — OFFICE VISIT (OUTPATIENT)
Dept: PAIN MEDICINE | Facility: CLINIC | Age: 77
End: 2022-09-02
Payer: MEDICARE

## 2022-09-02 VITALS
BODY MASS INDEX: 36.21 KG/M2 | SYSTOLIC BLOOD PRESSURE: 174 MMHG | HEART RATE: 67 BPM | DIASTOLIC BLOOD PRESSURE: 80 MMHG | WEIGHT: 211 LBS

## 2022-09-02 DIAGNOSIS — I48.91 ATRIAL FIBRILLATION, UNSPECIFIED TYPE: ICD-10-CM

## 2022-09-02 DIAGNOSIS — M47.816 LUMBAR SPONDYLOSIS: Primary | ICD-10-CM

## 2022-09-02 DIAGNOSIS — G89.29 CHRONIC BILATERAL LOW BACK PAIN WITHOUT SCIATICA: ICD-10-CM

## 2022-09-02 DIAGNOSIS — M54.50 CHRONIC BILATERAL LOW BACK PAIN WITHOUT SCIATICA: ICD-10-CM

## 2022-09-02 PROCEDURE — 99999 PR PBB SHADOW E&M-EST. PATIENT-LVL II: ICD-10-PCS | Mod: PBBFAC,,, | Performed by: PAIN MEDICINE

## 2022-09-02 PROCEDURE — 99214 PR OFFICE/OUTPT VISIT, EST, LEVL IV, 30-39 MIN: ICD-10-PCS | Mod: S$PBB,,, | Performed by: PAIN MEDICINE

## 2022-09-02 PROCEDURE — 99212 OFFICE O/P EST SF 10 MIN: CPT | Mod: PBBFAC,PN | Performed by: PAIN MEDICINE

## 2022-09-02 PROCEDURE — 99999 PR PBB SHADOW E&M-EST. PATIENT-LVL II: CPT | Mod: PBBFAC,,, | Performed by: PAIN MEDICINE

## 2022-09-02 PROCEDURE — 99214 OFFICE O/P EST MOD 30 MIN: CPT | Mod: S$PBB,,, | Performed by: PAIN MEDICINE

## 2022-09-02 NOTE — PROGRESS NOTES
[facility] New Patient Evaluation    Referred by: Anh Atkinson MD   Reason for referral: Scoliosis of lumbar spine, unspecified scoliosis type    CC:   Chief Complaint   Patient presents with    Back Pain     Interval Update:  9/2/2022 - Ms. Bragg is following up for chronic back pain.   Pain is currently rate 7/10 with a weekly range 7-8/10.  It is described as Aching.   Pain is worsened by Walking and improved by sitting.  Patient has not followed up with me since last year, because she is been working to improve her blood glucose.  She reports that she was unable to get her radiofrequency ablation of the lumbar spine in 2021, because her blood sugar was uncontrolled.  She previously reported 100% relief of low back pain following a series of 2 diagnostic medial branch blocks.  She reports that the location, severity, and quality of her low back pain have not changed since our last encounter.    No flowsheet data found.    HPI:   Val Bragg is a 76 y.o. female who complains of chronic low back pain exacerbated by a fall last year.  Patient reports having low back pain for at least 10 years prior to the fall.  She reports recovering from most of the pain related to the fall, but has lingering exacerbation of chronic low back pain near the base of the spine, between the hips.  She has attended physical therapy and received chiropractic care in the past for her back.  She does not exercise regularly or maintain home exercise regimen.  She has used Baclofen and Gabapentin, but continues to report pain ranging 9-10/10.  She reports being diagnosed with scoliosis and believes her pain is related.    Location: mid back   Onset: 1 year  Current Pain Score: 10/10  Daily Pain of Range: 9-10/10  Quality: Aching  Radiation: None  Worsened by: standing for more than several minutes  Improved by: sitting    Previous Therapies:  PT/OT: Yes, also chiropractor  HEP: none  Interventions:  2021 lumbar medial  branch block, bilateral L4-5 and L5-S1 with 100% relief x2  Surgery: denies back surgery  Medications:   - NSAIDS:   - MSK Relaxants:   - TCAs:   - SNRIs:   - Topicals:   - Anticonvulsants:  - Opioids:     Current Pain Medications:  Gabapentin  Balcofen     Review of Systems:  Review of Systems   Constitutional:  Negative for chills and fever.   HENT:  Negative for nosebleeds.    Eyes:  Negative for blurred vision.   Respiratory:  Negative for hemoptysis.    Cardiovascular:  Negative for chest pain and palpitations.   Gastrointestinal:  Negative for heartburn and vomiting.   Genitourinary:  Negative for hematuria.   Musculoskeletal:  Positive for back pain. Negative for myalgias.   Skin:  Negative for rash.   Neurological:  Negative for tingling, focal weakness, seizures and loss of consciousness.   Endo/Heme/Allergies:  Does not bruise/bleed easily.   Psychiatric/Behavioral:  Negative for hallucinations.      History:    Current Outpatient Medications:     baclofen (LIORESAL) 10 MG tablet, TAKE 1 TABLET(10 MG) BY MOUTH THREE TIMES DAILY, Disp: 90 tablet, Rfl: 3    blood sugar diagnostic Strp, To check BG 2 times daily, to use with insurance preferred meter, Disp: 100 strip, Rfl: 0    blood-glucose meter kit, Use meter to check blood sugar 2 (two) times a day (Patient taking differently: Use meter to check blood sugar 2 (two) times a day), Disp: 1 each, Rfl: 0    diazePAM (VALIUM) 5 MG tablet, Take 1 tablet (5 mg total) by mouth every evening., Disp: 30 tablet, Rfl: 3    flash glucose sensor (FREESTYLE TERESA 2 SENSOR) Kit, Change every 14 days., Disp: 2 kit, Rfl: 11    FLUoxetine 40 MG capsule, Take 1 capsule (40 mg total) by mouth once daily., Disp: 90 capsule, Rfl: 3    furosemide (LASIX) 40 MG tablet, Take 1 tablet (40 mg total) by mouth daily as needed., Disp: 10 tablet, Rfl: 0    gabapentin (NEURONTIN) 300 MG capsule, Take one capsule by mouth every morning and two capsules every evening, Disp: 270 capsule,  Rfl: 3    hydroCHLOROthiazide (HYDRODIURIL) 25 MG tablet, Take 1 tablet (25 mg total) by mouth once daily., Disp: 90 tablet, Rfl: 3    hydrocortisone 2.5 % cream, Apply topically 2 (two) times daily., Disp: 20 g, Rfl: 1    lancets Misc, To check BG 2 times daily, to use with insurance preferred meter, Disp: 100 each, Rfl: 3    lidocaine (LIDODERM) 5 %, Place 1 patch onto the skin once daily. Apply to affected area daily for 12 hours, then remove for 12 hours., Disp: 30 patch, Rfl: 6    losartan (COZAAR) 50 MG tablet, Take 1 tablet (50 mg total) by mouth once daily., Disp: 30 tablet, Rfl: 3    lovastatin (MEVACOR) 40 MG tablet, Take 1 tablet (40 mg total) by mouth nightly., Disp: 90 tablet, Rfl: 3    metFORMIN (GLUCOPHAGE) 500 MG tablet, Take 1 tablet (500 mg total) by mouth daily with breakfast., Disp: 90 tablet, Rfl: 3    metoprolol succinate (TOPROL-XL) 25 MG 24 hr tablet, Take 1 tablet (25 mg total) by mouth once daily., Disp: 30 tablet, Rfl: 11    mometasone (NASONEX) 50 mcg/actuation nasal spray, 2 sprays by Nasal route every evening., Disp: 1 each, Rfl: 3    multivitamin capsule, Take 1 capsule by mouth once daily., Disp: , Rfl:     NIFEdipine (PROCARDIA-XL) 90 MG (OSM) 24 hr tablet, Take 1 tablet (90 mg total) by mouth once daily. TAKE 1 TABLET(90 MG) BY MOUTH EVERY DAY, Disp: 90 tablet, Rfl: 3    pantoprazole (PROTONIX) 40 MG tablet, Take 1 tablet (40 mg total) by mouth once daily., Disp: 90 tablet, Rfl: 3    rivaroxaban (XARELTO) 15 mg Tab, Take 1 tablet (15 mg total) by mouth daily with dinner or evening meal., Disp: 30 tablet, Rfl: 11    SITagliptin (JANUVIA) 100 MG Tab, Take 1 tablet (100 mg total) by mouth once daily., Disp: 30 tablet, Rfl: 3    traZODone (DESYREL) 150 MG tablet, TAKE 1 TABLET(150 MG) BY MOUTH EVERY NIGHT, Disp: 90 tablet, Rfl: 3  No current facility-administered medications for this visit.    Facility-Administered Medications Ordered in Other Visits:     0.9%  NaCl infusion, ,  Intravenous, Continuous, Ashish Ren Jr., MD, Last Rate: 25 mL/hr at 05/04/21 1014, New Bag at 05/04/21 1014    0.9%  NaCl infusion, , Intravenous, Continuous, Ashish Ren Jr., MD, Last Rate: 25 mL/hr at 05/27/21 1116, New Bag at 05/27/21 1116    LIDOcaine (PF) 10 mg/ml (1%) injection 10 mg, 1 mL, Intradermal, Once, Ashish Ren Jr., MD    LIDOcaine (PF) 10 mg/ml (1%) injection 10 mg, 1 mL, Intradermal, Once, Ashish Ren Jr., MD    Past Medical History:   Diagnosis Date    Alternating exotropia 4/18/2013    Colon polyps 8/13/2019    GERD (gastroesophageal reflux disease)     Hyperlipidemia     Lumbar spondylosis 4/16/2021    Menopausal state     Multiple gastric polyps     on EGD in 2004    Pneumonia of right middle lobe due to infectious organism 3/19/2019    Right middle lobe pneumonia 3/19/2019    Sickle cell trait        Past Surgical History:   Procedure Laterality Date    CATARACT EXTRACTION W/  INTRAOCULAR LENS IMPLANT Left 06/05/2018    Dr. Castillo    CHOLECYSTECTOMY      laproscopic cholecystectomy    COLONOSCOPY N/A 8/13/2019    Procedure: COLONOSCOPY;  Surgeon: Jeffrey Maynard MD;  Location: [address];  Service: Endoscopy;  Laterality: N/A;    ESOPHAGOGASTRODUODENOSCOPY N/A 8/13/2019    Procedure: ESOPHAGOGASTRODUODENOSCOPY (EGD);  Surgeon: Jeffrey Maynard MD;  Location: [address]);  Service: Endoscopy;  Laterality: N/A;    HYSTERECTOMY      DENISHA/BSO - fibroids    INJECTION OF ANESTHETIC AGENT AROUND MEDIAL BRANCH NERVES INNERVATING LUMBAR FACET JOINT Bilateral 5/27/2021    Procedure: Block-nerve-medial branch-lumbar--Bilateral L4-5, L5-S1;  Surgeon: Ashish Ren Jr., MD;  Location: [facility];  Service: Pain Management;  Laterality: Bilateral;    INJECTION OF ANESTHETIC AGENT AROUND MEDIAL BRANCH NERVES INNERVATING LUMBAR FACET JOINT Bilateral 6/10/2021    Procedure: Block-nerve-medial branch-lumbar--bilateral-- L4-5, L5-S1;  Surgeon: Ashish Ren Jr., MD;   Location: [facility];  Service: Pain Management;  Laterality: Bilateral;  patient is diabetic    INTRAOCULAR PROSTHESES INSERTION Left 6/5/2018    Procedure: INSERTION-INTRAOCULAR LENS (IOL);  Surgeon: Ga Castillo MD;  Location: [facility] OR [address] [place] [address];  Service: Ophthalmology;  Laterality: Left;    Laparoscopic right hemicolectomy      OOPHORECTOMY      PHACOEMULSIFICATION OF CATARACT Left 6/5/2018    Procedure: PHACOEMULSIFICATION-ASPIRATION-CATARACT;  Surgeon: Ga Castillo MD;  Location: [facility] OR [address] [place] [address];  Service: Ophthalmology;  Laterality: Left;       Family History   Problem Relation Age of Onset    Diabetes Mother     Stroke Mother     Hypertension Father     Heart disease Father     Hypertension Sister     Diabetes Sister     Cataracts Sister     Stroke Sister     Hypertension Brother     Cancer Brother         bladder    Cataracts Brother     Stroke Brother     Diabetes Paternal Aunt     Stroke Sister     Colon cancer Sister 60    Breast cancer Neg Hx     Ovarian cancer Neg Hx     Melanoma Neg Hx     Amblyopia Neg Hx     Blindness Neg Hx     Glaucoma Neg Hx     Macular degeneration Neg Hx     Retinal detachment Neg Hx     Strabismus Neg Hx     Thyroid disease Neg Hx     Esophageal cancer Neg Hx        Social History     Socioeconomic History    Marital status:    Tobacco Use    Smoking status: Never    Smokeless tobacco: Never   Substance and Sexual Activity    Alcohol use: No    Drug use: No    Sexual activity: Not Currently     Partners: Male     Birth control/protection: None     Social Determinants of Health     Financial Resource Strain: Medium Risk    Difficulty of Paying Living Expenses: Somewhat hard   Food Insecurity: No Food Insecurity    Worried About Running Out of Food in the Last Year: Never true    Ran Out of Food in the Last Year: Never true   Transportation Needs: Unmet Transportation Needs    Lack of Transportation (Medical): Yes    Lack of Transportation  (Non-Medical): No   Physical Activity: Inactive    Days of Exercise per Week: 0 days    Minutes of Exercise per Session: 0 min   Stress: Stress Concern Present    Feeling of Stress : To some extent   Social Connections: Socially Isolated    Frequency of Communication with Friends and Family: Once a week    Frequency of Social Gatherings with Friends and Family: Once a week    Attends [employer] Services: Never    Active Member of Clubs or Organizations: No    Attends Club or Organization Meetings: Never    Marital Status:    Housing Stability: Low Risk     Unable to Pay for Housing in the Last Year: No    Number of Places Lived in the Last Year: 1    Unstable Housing in the Last Year: No       Review of patient's allergies indicates:   Allergen Reactions    Darvocet a500 [propoxyphene n-acetaminophen] Anaphylaxis       Physical Exam:  Vitals:    09/02/22 1425   BP: (!) 174/80   Pulse: 67   Weight: 95.7 kg (210 lb 15.7 oz)   PainSc:   7     General    Nursing note and vitals reviewed.  Constitutional: She is oriented to person, place, and time. She appears well-developed and well-nourished. No distress.   HENT:   Head: Normocephalic and atraumatic.   Nose: Nose normal.   Eyes: Conjunctivae and EOM are normal. Pupils are equal, round, and reactive to light. Right eye exhibits no discharge. Left eye exhibits no discharge. No scleral icterus.   Neck: No JVD present.   Cardiovascular:  Intact distal pulses.            Pulmonary/Chest: Effort normal. No respiratory distress.   Abdominal: She exhibits no distension.   Neurological: She is alert and oriented to person, place, and time. Coordination normal.   Psychiatric: She has a normal mood and affect. Her behavior is normal. Judgment and thought content normal.     General Musculoskeletal Exam   Gait: normal     Back (L-Spine & T-Spine) / Neck (C-Spine) Exam     Tenderness Right paramedian tenderness of the Lower L-Spine. Left paramedian tenderness of the Lower  L-Spine.     Back (L-Spine & T-Spine) Range of Motion   Back extension: facet loading is positive and exacerabtes/reproduces the patient's typical low back pain    Back flexion: limited ROM but partial relief of low back pain noted.     Spinal Sensation   Right Side Sensation  L-Spine Level: normal  Left Side Sensation  L-Spine Level: normal    Other   She has no scoliosis .      Muscle Strength   Right Lower Extremity   Hip Flexion: 5/5   Hip Extensors: 5/5  Quadriceps:  5/5   Hamstrin/5   Gastrocsoleus:  5/5   Left Lower Extremity   Hip Flexion: 5/5   Hip Extensors: 5/5  Quadriceps:  5/5   Hamstrin/5   Gastrocsoleus:  5/5     Reflexes     Left Side  Achilles:  2+  Quadriceps:  2+    Right Side   Achilles:  2+  Quadriceps:  2+    Imaging:  X-ray lumbar spine reviewed in the chart.  My assessment is significant for lumbar spondylosis most notably at L4-5 and L5-S1, bilaterally.  There is anterolisthesis of L4 on L5 (likely grade 1).  I see little to no evidence of scoliosis on this image.  The appearance is scoliosis on the AP image is slight if anything and may actually be related to the angle of the x-ray beam.    Labs:  BMP  Lab Results   Component Value Date     2022    K 3.8 2022     2022    CO2 26 2022    BUN 18 2022    CREATININE 2.0 (H) 2022    CALCIUM 10.1 2022    ANIONGAP 14 2022    ESTGFRAFRICA 27.2 (A) 2022    EGFRNONAA 23.6 (A) 2022     Lab Results   Component Value Date    ALT 27 2022    AST 32 2022    ALKPHOS 58 2022    BILITOT 0.5 2022       Assessment:  Problem List Items Addressed This Visit       Lumbar spondylosis - Primary    Chronic bilateral low back pain without sciatica     Other Visit Diagnoses       Atrial fibrillation, unspecified type              21- Val COPELAND Yemi is a 76 y.o. female with chronic, longstanding low back pain aggravated by a fall last year.  Obesity and  sedentary lifestyle also likely contributors.  Before getting into her treatment plan, I think it is worth noting that she does not have scoliosis.  There may be a small degree of curvature to the spine, but nothing that would have any clinical relevance to her condition.  In fact, I think the small curvature of the spine may be related to the angle of the x-ray beam and LEs related to actual scoliosis.  The most significant finding on her x-ray lumbar spine is degenerative facet disease at L4-5 and L5-S1 associated with anterolisthesis of L4 on L5 which would place additional strain on the associated facets.  While physical therapy and home exercise report for maintaining overall health and core muscle strength, this will not address or reverse arthritis.  The most effective treatment for arthritis is radiofrequency ablation of the medial branches serving those joints.  I have recommended bilateral medial branch block as a diagnostic test to be performed in duplicate.  If both rounds of blocks provide relief 50% or greater, she would become a candidate for radiofrequency ablation.    9/2/22 - this is a very pleasant 76-year-old female with chronic low back pain that was completely relieved by a lumbar medial branch block at L4-5 and L5-S1 x2.  Given this 100% relief for approximately 24 hours, she is an excellent candidate for a radiofrequency ablation at the same levels.  We previously discussed this, and placed her RFA on hold last summer because of her poorly controlled blood glucose.  Patient reports that her blood glucose control has improved since that time, and she would like to proceed with the intervention.  We also discussed holding her Xarelto (skip 1 dose prior to the procedure) because we are performing a bilateral procedure.  We will reach out to her primary care physician, Dr. Atkinson for permission to do this, but a 1 day hold should not substantially increase her risk of a thromboembolic event from  atrial fibrillation.    : Reviewed and consistent with medication use as prescribed.    Treatment Plan:   Scheduled for bilateral lumbar medial branch radiofrequency ablation at L4-5 and L5-S1 with IV moderate sedation.  I encouraged the patient to start a home exercise regimen that includes daily, moderate cardiovascular exercise lasting at least 30 minutes.  This may include yoga, ariel chi, walking, swimming, aqua aerobics, or other exercises that maintain a heart rate of 50-70% of the calculated maximum heart rate.  I also encouraged light, daily stretching focused on the target area.    Follow Up: as planned above    Ashish Ren Jr, MD  Interventional Pain Medicine / Anesthesiology    Disclaimer: This note was partly generated using dictation software which may occasionally result in transcription errors.

## 2022-09-02 NOTE — PROGRESS NOTES
[facility]    Referred by: No ref. provider found   Reason for referral: * No diagnoses found *     CC: No chief complaint on file.    Subjective:   Val Bragg is a 76 y.o. female who has a past medical history of Alternating exotropia, Colon polyps, GERD (gastroesophageal reflux disease), Hyperlipidemia, Lumbar spondylosis, Menopausal state, Multiple gastric polyps, Pneumonia of right middle lobe due to infectious organism, Right middle lobe pneumonia, and Sickle cell trait. She complains of pain as described below.    Location: Back    Onset: 1 1/2 yrs ago  Radiation: legs  Timing: constant  Current Pain Score: 7/10  Weekly Pain Range: 7-9/10  Quality: Aching  Worsened by: walking for more than 5 minutes  Improved by: sitting    Previous Therapies:  PT/OT:   HEP:   TENS:   Interventions:   Surgery:  Opioids:  Adjuvants:     Current Pain Medications:  ***     Assessment & Plan:  Problem List Items Addressed This Visit    None              Follow Up:     Disclaimer: This note was partly generated using dictation software which may occasionally result in transcription errors.    Imaging:  ***    Review of Systems:  ROS    Physical Exam:  There were no vitals filed for this visit.  Ortho/SPM Exam

## 2022-09-02 NOTE — H&P (VIEW-ONLY)
[facility] New Patient Evaluation    Referred by: Anh Atkinson MD   Reason for referral: Scoliosis of lumbar spine, unspecified scoliosis type    CC:   Chief Complaint   Patient presents with    Back Pain     Interval Update:  9/2/2022 - Ms. Bragg is following up for chronic back pain.   Pain is currently rate 7/10 with a weekly range 7-8/10.  It is described as Aching.   Pain is worsened by Walking and improved by sitting.  Patient has not followed up with me since last year, because she is been working to improve her blood glucose.  She reports that she was unable to get her radiofrequency ablation of the lumbar spine in 2021, because her blood sugar was uncontrolled.  She previously reported 100% relief of low back pain following a series of 2 diagnostic medial branch blocks.  She reports that the location, severity, and quality of her low back pain have not changed since our last encounter.    No flowsheet data found.    HPI:   Val Bragg is a 76 y.o. female who complains of chronic low back pain exacerbated by a fall last year.  Patient reports having low back pain for at least 10 years prior to the fall.  She reports recovering from most of the pain related to the fall, but has lingering exacerbation of chronic low back pain near the base of the spine, between the hips.  She has attended physical therapy and received chiropractic care in the past for her back.  She does not exercise regularly or maintain home exercise regimen.  She has used Baclofen and Gabapentin, but continues to report pain ranging 9-10/10.  She reports being diagnosed with scoliosis and believes her pain is related.    Location: mid back   Onset: 1 year  Current Pain Score: 10/10  Daily Pain of Range: 9-10/10  Quality: Aching  Radiation: None  Worsened by: standing for more than several minutes  Improved by: sitting    Previous Therapies:  PT/OT: Yes, also chiropractor  HEP: none  Interventions:  2021 lumbar medial  branch block, bilateral L4-5 and L5-S1 with 100% relief x2  Surgery: denies back surgery  Medications:   - NSAIDS:   - MSK Relaxants:   - TCAs:   - SNRIs:   - Topicals:   - Anticonvulsants:  - Opioids:     Current Pain Medications:  Gabapentin  Balcofen     Review of Systems:  Review of Systems   Constitutional:  Negative for chills and fever.   HENT:  Negative for nosebleeds.    Eyes:  Negative for blurred vision.   Respiratory:  Negative for hemoptysis.    Cardiovascular:  Negative for chest pain and palpitations.   Gastrointestinal:  Negative for heartburn and vomiting.   Genitourinary:  Negative for hematuria.   Musculoskeletal:  Positive for back pain. Negative for myalgias.   Skin:  Negative for rash.   Neurological:  Negative for tingling, focal weakness, seizures and loss of consciousness.   Endo/Heme/Allergies:  Does not bruise/bleed easily.   Psychiatric/Behavioral:  Negative for hallucinations.      History:    Current Outpatient Medications:     baclofen (LIORESAL) 10 MG tablet, TAKE 1 TABLET(10 MG) BY MOUTH THREE TIMES DAILY, Disp: 90 tablet, Rfl: 3    blood sugar diagnostic Strp, To check BG 2 times daily, to use with insurance preferred meter, Disp: 100 strip, Rfl: 0    blood-glucose meter kit, Use meter to check blood sugar 2 (two) times a day (Patient taking differently: Use meter to check blood sugar 2 (two) times a day), Disp: 1 each, Rfl: 0    diazePAM (VALIUM) 5 MG tablet, Take 1 tablet (5 mg total) by mouth every evening., Disp: 30 tablet, Rfl: 3    flash glucose sensor (FREESTYLE TERESA 2 SENSOR) Kit, Change every 14 days., Disp: 2 kit, Rfl: 11    FLUoxetine 40 MG capsule, Take 1 capsule (40 mg total) by mouth once daily., Disp: 90 capsule, Rfl: 3    furosemide (LASIX) 40 MG tablet, Take 1 tablet (40 mg total) by mouth daily as needed., Disp: 10 tablet, Rfl: 0    gabapentin (NEURONTIN) 300 MG capsule, Take one capsule by mouth every morning and two capsules every evening, Disp: 270 capsule,  Rfl: 3    hydroCHLOROthiazide (HYDRODIURIL) 25 MG tablet, Take 1 tablet (25 mg total) by mouth once daily., Disp: 90 tablet, Rfl: 3    hydrocortisone 2.5 % cream, Apply topically 2 (two) times daily., Disp: 20 g, Rfl: 1    lancets Misc, To check BG 2 times daily, to use with insurance preferred meter, Disp: 100 each, Rfl: 3    lidocaine (LIDODERM) 5 %, Place 1 patch onto the skin once daily. Apply to affected area daily for 12 hours, then remove for 12 hours., Disp: 30 patch, Rfl: 6    losartan (COZAAR) 50 MG tablet, Take 1 tablet (50 mg total) by mouth once daily., Disp: 30 tablet, Rfl: 3    lovastatin (MEVACOR) 40 MG tablet, Take 1 tablet (40 mg total) by mouth nightly., Disp: 90 tablet, Rfl: 3    metFORMIN (GLUCOPHAGE) 500 MG tablet, Take 1 tablet (500 mg total) by mouth daily with breakfast., Disp: 90 tablet, Rfl: 3    metoprolol succinate (TOPROL-XL) 25 MG 24 hr tablet, Take 1 tablet (25 mg total) by mouth once daily., Disp: 30 tablet, Rfl: 11    mometasone (NASONEX) 50 mcg/actuation nasal spray, 2 sprays by Nasal route every evening., Disp: 1 each, Rfl: 3    multivitamin capsule, Take 1 capsule by mouth once daily., Disp: , Rfl:     NIFEdipine (PROCARDIA-XL) 90 MG (OSM) 24 hr tablet, Take 1 tablet (90 mg total) by mouth once daily. TAKE 1 TABLET(90 MG) BY MOUTH EVERY DAY, Disp: 90 tablet, Rfl: 3    pantoprazole (PROTONIX) 40 MG tablet, Take 1 tablet (40 mg total) by mouth once daily., Disp: 90 tablet, Rfl: 3    rivaroxaban (XARELTO) 15 mg Tab, Take 1 tablet (15 mg total) by mouth daily with dinner or evening meal., Disp: 30 tablet, Rfl: 11    SITagliptin (JANUVIA) 100 MG Tab, Take 1 tablet (100 mg total) by mouth once daily., Disp: 30 tablet, Rfl: 3    traZODone (DESYREL) 150 MG tablet, TAKE 1 TABLET(150 MG) BY MOUTH EVERY NIGHT, Disp: 90 tablet, Rfl: 3  No current facility-administered medications for this visit.    Facility-Administered Medications Ordered in Other Visits:     0.9%  NaCl infusion, ,  Intravenous, Continuous, Ashish Ren Jr., MD, Last Rate: 25 mL/hr at 05/04/21 1014, New Bag at 05/04/21 1014    0.9%  NaCl infusion, , Intravenous, Continuous, Ashish Ren Jr., MD, Last Rate: 25 mL/hr at 05/27/21 1116, New Bag at 05/27/21 1116    LIDOcaine (PF) 10 mg/ml (1%) injection 10 mg, 1 mL, Intradermal, Once, Ashish Ren Jr., MD    LIDOcaine (PF) 10 mg/ml (1%) injection 10 mg, 1 mL, Intradermal, Once, Ashish Ren Jr., MD    Past Medical History:   Diagnosis Date    Alternating exotropia 4/18/2013    Colon polyps 8/13/2019    GERD (gastroesophageal reflux disease)     Hyperlipidemia     Lumbar spondylosis 4/16/2021    Menopausal state     Multiple gastric polyps     on EGD in 2004    Pneumonia of right middle lobe due to infectious organism 3/19/2019    Right middle lobe pneumonia 3/19/2019    Sickle cell trait        Past Surgical History:   Procedure Laterality Date    CATARACT EXTRACTION W/  INTRAOCULAR LENS IMPLANT Left 06/05/2018    Dr. Castillo    CHOLECYSTECTOMY      laproscopic cholecystectomy    COLONOSCOPY N/A 8/13/2019    Procedure: COLONOSCOPY;  Surgeon: Jeffrey Maynard MD;  Location: [address];  Service: Endoscopy;  Laterality: N/A;    ESOPHAGOGASTRODUODENOSCOPY N/A 8/13/2019    Procedure: ESOPHAGOGASTRODUODENOSCOPY (EGD);  Surgeon: Jeffrey Maynard MD;  Location: [address]);  Service: Endoscopy;  Laterality: N/A;    HYSTERECTOMY      DENISHA/BSO - fibroids    INJECTION OF ANESTHETIC AGENT AROUND MEDIAL BRANCH NERVES INNERVATING LUMBAR FACET JOINT Bilateral 5/27/2021    Procedure: Block-nerve-medial branch-lumbar--Bilateral L4-5, L5-S1;  Surgeon: Ashish Ren Jr., MD;  Location: [facility];  Service: Pain Management;  Laterality: Bilateral;    INJECTION OF ANESTHETIC AGENT AROUND MEDIAL BRANCH NERVES INNERVATING LUMBAR FACET JOINT Bilateral 6/10/2021    Procedure: Block-nerve-medial branch-lumbar--bilateral-- L4-5, L5-S1;  Surgeon: Ashish Ren Jr., MD;   Location: [facility];  Service: Pain Management;  Laterality: Bilateral;  patient is diabetic    INTRAOCULAR PROSTHESES INSERTION Left 6/5/2018    Procedure: INSERTION-INTRAOCULAR LENS (IOL);  Surgeon: Ga Castillo MD;  Location: [facility] OR [address] [place] [address];  Service: Ophthalmology;  Laterality: Left;    Laparoscopic right hemicolectomy      OOPHORECTOMY      PHACOEMULSIFICATION OF CATARACT Left 6/5/2018    Procedure: PHACOEMULSIFICATION-ASPIRATION-CATARACT;  Surgeon: Ga Castillo MD;  Location: [facility] OR [address] [place] [address];  Service: Ophthalmology;  Laterality: Left;       Family History   Problem Relation Age of Onset    Diabetes Mother     Stroke Mother     Hypertension Father     Heart disease Father     Hypertension Sister     Diabetes Sister     Cataracts Sister     Stroke Sister     Hypertension Brother     Cancer Brother         bladder    Cataracts Brother     Stroke Brother     Diabetes Paternal Aunt     Stroke Sister     Colon cancer Sister 60    Breast cancer Neg Hx     Ovarian cancer Neg Hx     Melanoma Neg Hx     Amblyopia Neg Hx     Blindness Neg Hx     Glaucoma Neg Hx     Macular degeneration Neg Hx     Retinal detachment Neg Hx     Strabismus Neg Hx     Thyroid disease Neg Hx     Esophageal cancer Neg Hx        Social History     Socioeconomic History    Marital status:    Tobacco Use    Smoking status: Never    Smokeless tobacco: Never   Substance and Sexual Activity    Alcohol use: No    Drug use: No    Sexual activity: Not Currently     Partners: Male     Birth control/protection: None     Social Determinants of Health     Financial Resource Strain: Medium Risk    Difficulty of Paying Living Expenses: Somewhat hard   Food Insecurity: No Food Insecurity    Worried About Running Out of Food in the Last Year: Never true    Ran Out of Food in the Last Year: Never true   Transportation Needs: Unmet Transportation Needs    Lack of Transportation (Medical): Yes    Lack of Transportation  (Non-Medical): No   Physical Activity: Inactive    Days of Exercise per Week: 0 days    Minutes of Exercise per Session: 0 min   Stress: Stress Concern Present    Feeling of Stress : To some extent   Social Connections: Socially Isolated    Frequency of Communication with Friends and Family: Once a week    Frequency of Social Gatherings with Friends and Family: Once a week    Attends Shinto Services: Never    Active Member of Clubs or Organizations: No    Attends Club or Organization Meetings: Never    Marital Status:    Housing Stability: Low Risk     Unable to Pay for Housing in the Last Year: No    Number of Places Lived in the Last Year: 1    Unstable Housing in the Last Year: No       Review of patient's allergies indicates:   Allergen Reactions    Darvocet a500 [propoxyphene n-acetaminophen] Anaphylaxis       Physical Exam:  Vitals:    09/02/22 1425   BP: (!) 174/80   Pulse: 67   Weight: 95.7 kg (210 lb 15.7 oz)   PainSc:   7     General    Nursing note and vitals reviewed.  Constitutional: She is oriented to person, place, and time. She appears well-developed and well-nourished. No distress.   HENT:   Head: Normocephalic and atraumatic.   Nose: Nose normal.   Eyes: Conjunctivae and EOM are normal. Pupils are equal, round, and reactive to light. Right eye exhibits no discharge. Left eye exhibits no discharge. No scleral icterus.   Neck: No JVD present.   Cardiovascular:  Intact distal pulses.            Pulmonary/Chest: Effort normal. No respiratory distress.   Abdominal: She exhibits no distension.   Neurological: She is alert and oriented to person, place, and time. Coordination normal.   Psychiatric: She has a normal mood and affect. Her behavior is normal. Judgment and thought content normal.     General Musculoskeletal Exam   Gait: normal     Back (L-Spine & T-Spine) / Neck (C-Spine) Exam     Tenderness Right paramedian tenderness of the Lower L-Spine. Left paramedian tenderness of the Lower  L-Spine.     Back (L-Spine & T-Spine) Range of Motion   Back extension: facet loading is positive and exacerabtes/reproduces the patient's typical low back pain    Back flexion: limited ROM but partial relief of low back pain noted.     Spinal Sensation   Right Side Sensation  L-Spine Level: normal  Left Side Sensation  L-Spine Level: normal    Other   She has no scoliosis .      Muscle Strength   Right Lower Extremity   Hip Flexion: 5/5   Hip Extensors: 5/5  Quadriceps:  5/5   Hamstrin/5   Gastrocsoleus:  5/5   Left Lower Extremity   Hip Flexion: 5/5   Hip Extensors: 5/5  Quadriceps:  5/5   Hamstrin/5   Gastrocsoleus:  5/5     Reflexes     Left Side  Achilles:  2+  Quadriceps:  2+    Right Side   Achilles:  2+  Quadriceps:  2+    Imaging:  X-ray lumbar spine reviewed in the chart.  My assessment is significant for lumbar spondylosis most notably at L4-5 and L5-S1, bilaterally.  There is anterolisthesis of L4 on L5 (likely grade 1).  I see little to no evidence of scoliosis on this image.  The appearance is scoliosis on the AP image is slight if anything and may actually be related to the angle of the x-ray beam.    Labs:  BMP  Lab Results   Component Value Date     2022    K 3.8 2022     2022    CO2 26 2022    BUN 18 2022    CREATININE 2.0 (H) 2022    CALCIUM 10.1 2022    ANIONGAP 14 2022    ESTGFRAFRICA 27.2 (A) 2022    EGFRNONAA 23.6 (A) 2022     Lab Results   Component Value Date    ALT 27 2022    AST 32 2022    ALKPHOS 58 2022    BILITOT 0.5 2022       Assessment:  Problem List Items Addressed This Visit       Lumbar spondylosis - Primary    Chronic bilateral low back pain without sciatica     Other Visit Diagnoses       Atrial fibrillation, unspecified type              21- Val COPELAND Yemi is a 76 y.o. female with chronic, longstanding low back pain aggravated by a fall last year.  Obesity and  sedentary lifestyle also likely contributors.  Before getting into her treatment plan, I think it is worth noting that she does not have scoliosis.  There may be a small degree of curvature to the spine, but nothing that would have any clinical relevance to her condition.  In fact, I think the small curvature of the spine may be related to the angle of the x-ray beam and LEs related to actual scoliosis.  The most significant finding on her x-ray lumbar spine is degenerative facet disease at L4-5 and L5-S1 associated with anterolisthesis of L4 on L5 which would place additional strain on the associated facets.  While physical therapy and home exercise report for maintaining overall health and core muscle strength, this will not address or reverse arthritis.  The most effective treatment for arthritis is radiofrequency ablation of the medial branches serving those joints.  I have recommended bilateral medial branch block as a diagnostic test to be performed in duplicate.  If both rounds of blocks provide relief 50% or greater, she would become a candidate for radiofrequency ablation.    9/2/22 - this is a very pleasant 76-year-old female with chronic low back pain that was completely relieved by a lumbar medial branch block at L4-5 and L5-S1 x2.  Given this 100% relief for approximately 24 hours, she is an excellent candidate for a radiofrequency ablation at the same levels.  We previously discussed this, and placed her RFA on hold last summer because of her poorly controlled blood glucose.  Patient reports that her blood glucose control has improved since that time, and she would like to proceed with the intervention.  We also discussed holding her Xarelto (skip 1 dose prior to the procedure) because we are performing a bilateral procedure.  We will reach out to her primary care physician, Dr. Atkinson for permission to do this, but a 1 day hold should not substantially increase her risk of a thromboembolic event from  atrial fibrillation.    : Reviewed and consistent with medication use as prescribed.    Treatment Plan:   Scheduled for bilateral lumbar medial branch radiofrequency ablation at L4-5 and L5-S1 with IV moderate sedation.  I encouraged the patient to start a home exercise regimen that includes daily, moderate cardiovascular exercise lasting at least 30 minutes.  This may include yoga, ariel chi, walking, swimming, aqua aerobics, or other exercises that maintain a heart rate of 50-70% of the calculated maximum heart rate.  I also encouraged light, daily stretching focused on the target area.    Follow Up: as planned above    Ashish Ren Jr, MD  Interventional Pain Medicine / Anesthesiology    Disclaimer: This note was partly generated using dictation software which may occasionally result in transcription errors.

## 2022-09-06 ENCOUNTER — DOCUMENT SCAN (OUTPATIENT)
Dept: HOME HEALTH SERVICES | Facility: HOSPITAL | Age: 77
End: 2022-09-06
Payer: MEDICARE

## 2022-09-06 ENCOUNTER — TELEPHONE (OUTPATIENT)
Dept: INTERNAL MEDICINE | Facility: CLINIC | Age: 77
End: 2022-09-06
Payer: MEDICARE

## 2022-09-06 ENCOUNTER — TELEPHONE (OUTPATIENT)
Dept: PAIN MEDICINE | Facility: CLINIC | Age: 77
End: 2022-09-06
Payer: MEDICARE

## 2022-09-06 DIAGNOSIS — M47.816 LUMBAR SPONDYLOSIS: Primary | ICD-10-CM

## 2022-09-06 DIAGNOSIS — G47.00 INSOMNIA, UNSPECIFIED TYPE: ICD-10-CM

## 2022-09-06 NOTE — TELEPHONE ENCOUNTER
----- Message from Delphine Hernández MA sent at 9/6/2022  9:43 AM CDT -----  Regarding: Medical Clearance  We are requesting clearance to hold Blood Thinners: Xarelto (rivaroxaban) for 1 day. Pt is scheduled for Lumbar RFA with Dr Ren on 9/13/2022.     Please adivse.

## 2022-09-07 RX ORDER — DIAZEPAM 5 MG/1
5 TABLET ORAL NIGHTLY
Qty: 30 TABLET | Refills: 3 | Status: SHIPPED | OUTPATIENT
Start: 2022-09-07 | End: 2023-01-19 | Stop reason: SDUPTHER

## 2022-09-12 ENCOUNTER — TELEPHONE (OUTPATIENT)
Dept: PAIN MEDICINE | Facility: CLINIC | Age: 77
End: 2022-09-12
Payer: MEDICARE

## 2022-09-13 ENCOUNTER — HOSPITAL ENCOUNTER (OUTPATIENT)
Facility: HOSPITAL | Age: 77
Discharge: HOME | End: 2022-09-13
Attending: PAIN MEDICINE | Admitting: PAIN MEDICINE
Payer: MEDICARE

## 2022-09-13 VITALS
BODY MASS INDEX: 36.02 KG/M2 | DIASTOLIC BLOOD PRESSURE: 84 MMHG | RESPIRATION RATE: 16 BRPM | WEIGHT: 211 LBS | HEART RATE: 113 BPM | HEIGHT: 64 IN | SYSTOLIC BLOOD PRESSURE: 173 MMHG | TEMPERATURE: 98 F | OXYGEN SATURATION: 94 %

## 2022-09-13 DIAGNOSIS — M47.9 OSTEOARTHRITIS OF SPINE, UNSPECIFIED SPINAL OSTEOARTHRITIS COMPLICATION STATUS, UNSPECIFIED SPINAL REGION: Primary | ICD-10-CM

## 2022-09-13 DIAGNOSIS — G89.29 CHRONIC PAIN: ICD-10-CM

## 2022-09-13 LAB — POCT GLUCOSE: 210 MG/DL (ref 70–110)

## 2022-09-13 PROCEDURE — 25000003 PHARM REV CODE 250: Performed by: STUDENT IN AN ORGANIZED HEALTH CARE EDUCATION/TRAINING PROGRAM

## 2022-09-13 PROCEDURE — 25000003 PHARM REV CODE 250: Performed by: PAIN MEDICINE

## 2022-09-13 RX ORDER — SODIUM CHLORIDE 9 MG/ML
500 INJECTION, SOLUTION INTRAVENOUS CONTINUOUS
Status: DISCONTINUED | OUTPATIENT
Start: 2022-09-13 | End: 2022-09-13 | Stop reason: HOSPADM

## 2022-09-13 RX ORDER — LIDOCAINE HYDROCHLORIDE 10 MG/ML
1 INJECTION, SOLUTION EPIDURAL; INFILTRATION; INTRACAUDAL; PERINEURAL ONCE
Status: DISCONTINUED | OUTPATIENT
Start: 2022-09-13 | End: 2022-09-13 | Stop reason: HOSPADM

## 2022-09-13 RX ORDER — METHYLPREDNISOLONE ACETATE 40 MG/ML
INJECTION, SUSPENSION INTRA-ARTICULAR; INTRALESIONAL; INTRAMUSCULAR; SOFT TISSUE
Status: DISCONTINUED | OUTPATIENT
Start: 2022-09-13 | End: 2022-09-13 | Stop reason: HOSPADM

## 2022-09-13 RX ORDER — INDOMETHACIN 25 MG/1
CAPSULE ORAL
Status: DISCONTINUED | OUTPATIENT
Start: 2022-09-13 | End: 2022-09-13 | Stop reason: HOSPADM

## 2022-09-13 RX ORDER — LIDOCAINE HYDROCHLORIDE 20 MG/ML
INJECTION, SOLUTION EPIDURAL; INFILTRATION; INTRACAUDAL; PERINEURAL
Status: DISCONTINUED | OUTPATIENT
Start: 2022-09-13 | End: 2022-09-13 | Stop reason: HOSPADM

## 2022-09-13 RX ORDER — BUPIVACAINE HYDROCHLORIDE 2.5 MG/ML
INJECTION, SOLUTION EPIDURAL; INFILTRATION; INTRACAUDAL
Status: DISCONTINUED | OUTPATIENT
Start: 2022-09-13 | End: 2022-09-13 | Stop reason: HOSPADM

## 2022-09-13 RX ADMIN — SODIUM CHLORIDE 500 ML: 0.9 INJECTION, SOLUTION INTRAVENOUS at 09:09

## 2022-09-13 NOTE — INTERVAL H&P NOTE
The patient was examined and no significant changes were noted from the updated H&P or last clinic note.    The risks and benefits of this procedure, including alternative therapies, were discussed with the patient.  The discussion of risks included infection, bleeding, need for additional procedures or surgery, nerve damage, paralysis, adverse medication reaction(s), stroke, and if appropriate for the procedure, death.  Questions regarding the procedure, risks, expected outcome, and possible side effects were solicited and answered to Val's satisfaction.  Val Bragg wishes to proceed with the injection or procedure as confirmed by written consent.

## 2022-09-13 NOTE — PLAN OF CARE
Procedure canceled due to elevated HR and BP. Dr. Ren informed patient that procedure will be rescheduled. Patient transported to pre/post area for further monitoring.

## 2022-09-13 NOTE — PROCEDURES
Patient was brought into procedure suite and placed on telemetry.  She was found to have a BP of approximately 200/120 mmHg, O2 sat of 93%, and ECG showing intermittent RVR of 120 bpm.  BP was stable.    She noted nervousness but also reported that she had not taken her antihypertensive medications this morning.    The procedure was cancelled and she was transport to PACU for monitoring under telemetry.  If needed she will be transferred to the ED.    Ashish Ren Jr, MD  Interventional Pain Medicine / Anesthesiology

## 2022-09-13 NOTE — PLAN OF CARE
Per MD, patient placed on telemetry; vital signs monitored and recorded. Patient denies any chest pain, dizziness, or weakness. Observed controlled A-fib.Vital signs stable: T 98.1, /84, , O2 sat 95%, MD notified of vital signs; patient cleared for discharge. Patient instructed to resume BP medications promptly upon arrival to residence. Patient verbalized understanding of instructions.

## 2022-09-16 RX ORDER — LOSARTAN POTASSIUM 50 MG/1
50 TABLET ORAL DAILY
Qty: 30 TABLET | Refills: 3 | Status: SHIPPED | OUTPATIENT
Start: 2022-09-16 | End: 2023-01-19 | Stop reason: SDUPTHER

## 2022-09-30 ENCOUNTER — EXTERNAL CHRONIC CARE MANAGEMENT (OUTPATIENT)
Dept: PRIMARY CARE CLINIC | Facility: CLINIC | Age: 77
End: 2022-09-30
Payer: MEDICARE

## 2022-09-30 PROCEDURE — 99487 PR COMPLX CHRON CARE MGMT, 1ST HR, PER MONTH: ICD-10-PCS | Mod: S$PBB,,, | Performed by: INTERNAL MEDICINE

## 2022-09-30 PROCEDURE — 99487 CPLX CHRNC CARE 1ST 60 MIN: CPT | Mod: S$PBB,,, | Performed by: INTERNAL MEDICINE

## 2022-09-30 PROCEDURE — 99489 CPLX CHRNC CARE EA ADDL 30: CPT | Mod: S$PBB,,, | Performed by: INTERNAL MEDICINE

## 2022-09-30 PROCEDURE — 99489 PR COMPLX CHRON CARE MGMT, EA ADDTL 30 MIN, PER MONTH: ICD-10-PCS | Mod: S$PBB,,, | Performed by: INTERNAL MEDICINE

## 2022-09-30 PROCEDURE — 99489 CPLX CHRNC CARE EA ADDL 30: CPT | Mod: PBBFAC,PO | Performed by: INTERNAL MEDICINE

## 2022-09-30 PROCEDURE — 99487 CPLX CHRNC CARE 1ST 60 MIN: CPT | Mod: PBBFAC,PO | Performed by: INTERNAL MEDICINE

## 2022-10-31 ENCOUNTER — EXTERNAL CHRONIC CARE MANAGEMENT (OUTPATIENT)
Dept: PRIMARY CARE CLINIC | Facility: CLINIC | Age: 77
End: 2022-10-31
Payer: MEDICARE

## 2022-10-31 PROCEDURE — 99439 CHRNC CARE MGMT STAF EA ADDL: CPT | Mod: S$PBB,,, | Performed by: INTERNAL MEDICINE

## 2022-10-31 PROCEDURE — 99490 PR CHRONIC CARE MGMT, 1ST 20 MIN: ICD-10-PCS | Mod: S$PBB,,, | Performed by: INTERNAL MEDICINE

## 2022-10-31 PROCEDURE — 99490 CHRNC CARE MGMT STAFF 1ST 20: CPT | Mod: S$PBB,,, | Performed by: INTERNAL MEDICINE

## 2022-10-31 PROCEDURE — 99490 CHRNC CARE MGMT STAFF 1ST 20: CPT | Mod: PBBFAC,PO | Performed by: INTERNAL MEDICINE

## 2022-10-31 PROCEDURE — 99439 CHRNC CARE MGMT STAF EA ADDL: CPT | Mod: PBBFAC,PO | Performed by: INTERNAL MEDICINE

## 2022-10-31 PROCEDURE — 99439 PR CHRONIC CARE MGMT, EA ADDTL 20 MIN: ICD-10-PCS | Mod: S$PBB,,, | Performed by: INTERNAL MEDICINE

## 2022-11-06 DIAGNOSIS — K21.9 GASTROESOPHAGEAL REFLUX DISEASE: ICD-10-CM

## 2022-11-06 DIAGNOSIS — M41.9 SCOLIOSIS OF LUMBAR SPINE, UNSPECIFIED SCOLIOSIS TYPE: ICD-10-CM

## 2022-11-08 RX ORDER — PANTOPRAZOLE SODIUM 40 MG/1
40 TABLET, DELAYED RELEASE ORAL DAILY
Qty: 90 TABLET | Refills: 3 | Status: SHIPPED | OUTPATIENT
Start: 2022-11-08

## 2022-11-08 RX ORDER — BACLOFEN 10 MG/1
TABLET ORAL
Qty: 90 TABLET | Refills: 3 | Status: SHIPPED | OUTPATIENT
Start: 2022-11-08 | End: 2023-03-16 | Stop reason: SDUPTHER

## 2022-11-30 ENCOUNTER — EXTERNAL CHRONIC CARE MANAGEMENT (OUTPATIENT)
Dept: PRIMARY CARE CLINIC | Facility: CLINIC | Age: 77
End: 2022-11-30
Payer: MEDICARE

## 2022-11-30 PROCEDURE — 99439 CHRNC CARE MGMT STAF EA ADDL: CPT | Mod: PBBFAC,PO | Performed by: INTERNAL MEDICINE

## 2022-11-30 PROCEDURE — 99490 CHRNC CARE MGMT STAFF 1ST 20: CPT | Mod: PBBFAC,25,PO | Performed by: INTERNAL MEDICINE

## 2022-11-30 PROCEDURE — 99439 PR CHRONIC CARE MGMT, EA ADDTL 20 MIN: ICD-10-PCS | Mod: S$PBB,,, | Performed by: INTERNAL MEDICINE

## 2022-11-30 PROCEDURE — 99439 CHRNC CARE MGMT STAF EA ADDL: CPT | Mod: S$PBB,,, | Performed by: INTERNAL MEDICINE

## 2022-11-30 PROCEDURE — 99490 PR CHRONIC CARE MGMT, 1ST 20 MIN: ICD-10-PCS | Mod: S$PBB,,, | Performed by: INTERNAL MEDICINE

## 2022-11-30 PROCEDURE — 99490 CHRNC CARE MGMT STAFF 1ST 20: CPT | Mod: S$PBB,,, | Performed by: INTERNAL MEDICINE

## 2022-12-12 RX ORDER — HYDROCORTISONE 25 MG/G
CREAM TOPICAL 2 TIMES DAILY
Qty: 20 G | Refills: 1 | Status: SHIPPED | OUTPATIENT
Start: 2022-12-12 | End: 2023-06-30 | Stop reason: SDUPTHER

## 2022-12-22 ENCOUNTER — TELEPHONE (OUTPATIENT)
Dept: INTERNAL MEDICINE | Facility: CLINIC | Age: 77
End: 2022-12-22

## 2022-12-31 ENCOUNTER — EXTERNAL CHRONIC CARE MANAGEMENT (OUTPATIENT)
Dept: PRIMARY CARE CLINIC | Facility: CLINIC | Age: 77
End: 2022-12-31
Payer: MEDICARE

## 2022-12-31 PROCEDURE — 99487 PR COMPLX CHRON CARE MGMT, 1ST HR, PER MONTH: ICD-10-PCS | Mod: S$PBB,,, | Performed by: INTERNAL MEDICINE

## 2022-12-31 PROCEDURE — 99489 PR COMPLX CHRON CARE MGMT, EA ADDTL 30 MIN, PER MONTH: ICD-10-PCS | Mod: S$PBB,,, | Performed by: INTERNAL MEDICINE

## 2022-12-31 PROCEDURE — 99489 CPLX CHRNC CARE EA ADDL 30: CPT | Mod: PBBFAC,PO | Performed by: INTERNAL MEDICINE

## 2022-12-31 PROCEDURE — 99487 CPLX CHRNC CARE 1ST 60 MIN: CPT | Mod: PBBFAC,PO | Performed by: INTERNAL MEDICINE

## 2022-12-31 PROCEDURE — 99487 CPLX CHRNC CARE 1ST 60 MIN: CPT | Mod: S$PBB,,, | Performed by: INTERNAL MEDICINE

## 2022-12-31 PROCEDURE — 99489 CPLX CHRNC CARE EA ADDL 30: CPT | Mod: S$PBB,,, | Performed by: INTERNAL MEDICINE

## 2023-01-19 DIAGNOSIS — G47.00 INSOMNIA, UNSPECIFIED TYPE: ICD-10-CM

## 2023-01-19 RX ORDER — LOSARTAN POTASSIUM 50 MG/1
50 TABLET ORAL DAILY
Qty: 30 TABLET | Refills: 3 | Status: SHIPPED | OUTPATIENT
Start: 2023-01-19 | End: 2023-05-18 | Stop reason: SDUPTHER

## 2023-01-19 RX ORDER — DIAZEPAM 5 MG/1
5 TABLET ORAL NIGHTLY
Qty: 30 TABLET | Refills: 3 | Status: SHIPPED | OUTPATIENT
Start: 2023-01-19 | End: 2023-05-26 | Stop reason: SDUPTHER

## 2023-01-31 ENCOUNTER — EXTERNAL CHRONIC CARE MANAGEMENT (OUTPATIENT)
Dept: PRIMARY CARE CLINIC | Facility: CLINIC | Age: 78
End: 2023-01-31
Payer: MEDICARE

## 2023-01-31 PROCEDURE — 99439 PR CHRONIC CARE MGMT, EA ADDTL 20 MIN: ICD-10-PCS | Mod: S$PBB,,, | Performed by: INTERNAL MEDICINE

## 2023-01-31 PROCEDURE — 99439 CHRNC CARE MGMT STAF EA ADDL: CPT | Mod: PBBFAC,PO,27 | Performed by: INTERNAL MEDICINE

## 2023-01-31 PROCEDURE — 99490 CHRNC CARE MGMT STAFF 1ST 20: CPT | Mod: S$PBB,,, | Performed by: INTERNAL MEDICINE

## 2023-01-31 PROCEDURE — 99490 PR CHRONIC CARE MGMT, 1ST 20 MIN: ICD-10-PCS | Mod: S$PBB,,, | Performed by: INTERNAL MEDICINE

## 2023-01-31 PROCEDURE — 99439 CHRNC CARE MGMT STAF EA ADDL: CPT | Mod: S$PBB,,, | Performed by: INTERNAL MEDICINE

## 2023-01-31 PROCEDURE — 99490 CHRNC CARE MGMT STAFF 1ST 20: CPT | Mod: PBBFAC,PO | Performed by: INTERNAL MEDICINE

## 2023-02-03 RX ORDER — GABAPENTIN 300 MG/1
CAPSULE ORAL
Qty: 270 CAPSULE | Refills: 3 | Status: SHIPPED | OUTPATIENT
Start: 2023-02-03

## 2023-02-08 ENCOUNTER — PATIENT MESSAGE (OUTPATIENT)
Dept: INTERNAL MEDICINE | Facility: CLINIC | Age: 78
End: 2023-02-08
Payer: MEDICARE

## 2023-02-16 DIAGNOSIS — E78.5 HYPERLIPIDEMIA, UNSPECIFIED HYPERLIPIDEMIA TYPE: ICD-10-CM

## 2023-02-16 RX ORDER — LOVASTATIN 40 MG/1
40 TABLET ORAL NIGHTLY
Qty: 90 TABLET | Refills: 3 | Status: SHIPPED | OUTPATIENT
Start: 2023-02-16 | End: 2024-02-03 | Stop reason: SDUPTHER

## 2023-02-23 ENCOUNTER — PATIENT MESSAGE (OUTPATIENT)
Dept: ADMINISTRATIVE | Facility: HOSPITAL | Age: 78
End: 2023-02-23
Payer: MEDICARE

## 2023-02-23 ENCOUNTER — PATIENT OUTREACH (OUTPATIENT)
Dept: ADMINISTRATIVE | Facility: HOSPITAL | Age: 78
End: 2023-02-23
Payer: MEDICARE

## 2023-02-28 ENCOUNTER — EXTERNAL CHRONIC CARE MANAGEMENT (OUTPATIENT)
Dept: PRIMARY CARE CLINIC | Facility: CLINIC | Age: 78
End: 2023-02-28
Payer: MEDICARE

## 2023-02-28 PROCEDURE — 99490 PR CHRONIC CARE MGMT, 1ST 20 MIN: ICD-10-PCS | Mod: S$PBB,,, | Performed by: INTERNAL MEDICINE

## 2023-02-28 PROCEDURE — 99490 CHRNC CARE MGMT STAFF 1ST 20: CPT | Mod: S$PBB,,, | Performed by: INTERNAL MEDICINE

## 2023-02-28 PROCEDURE — 99439 CHRNC CARE MGMT STAF EA ADDL: CPT | Mod: PBBFAC,PO | Performed by: INTERNAL MEDICINE

## 2023-02-28 PROCEDURE — 99439 PR CHRONIC CARE MGMT, EA ADDTL 20 MIN: ICD-10-PCS | Mod: S$PBB,,, | Performed by: INTERNAL MEDICINE

## 2023-02-28 PROCEDURE — 99490 CHRNC CARE MGMT STAFF 1ST 20: CPT | Mod: PBBFAC,PO | Performed by: INTERNAL MEDICINE

## 2023-02-28 PROCEDURE — 99439 CHRNC CARE MGMT STAF EA ADDL: CPT | Mod: S$PBB,,, | Performed by: INTERNAL MEDICINE

## 2023-03-03 ENCOUNTER — PATIENT MESSAGE (OUTPATIENT)
Dept: INTERNAL MEDICINE | Facility: CLINIC | Age: 78
End: 2023-03-03
Payer: MEDICARE

## 2023-03-03 RX ORDER — MOMETASONE FUROATE 50 UG/1
2 SPRAY, METERED NASAL NIGHTLY
Qty: 17 G | Refills: 3 | Status: SHIPPED | OUTPATIENT
Start: 2023-03-03

## 2023-03-15 DIAGNOSIS — F32.A DEPRESSION, UNSPECIFIED DEPRESSION TYPE: ICD-10-CM

## 2023-03-15 DIAGNOSIS — M41.9 SCOLIOSIS OF LUMBAR SPINE, UNSPECIFIED SCOLIOSIS TYPE: ICD-10-CM

## 2023-03-16 RX ORDER — BACLOFEN 10 MG/1
TABLET ORAL
Qty: 90 TABLET | Refills: 3 | OUTPATIENT
Start: 2023-03-16 | End: 2023-07-26 | Stop reason: SDUPTHER

## 2023-03-16 RX ORDER — FLUOXETINE HYDROCHLORIDE 40 MG/1
40 CAPSULE ORAL DAILY
Qty: 90 CAPSULE | Refills: 3 | OUTPATIENT
Start: 2023-03-16

## 2023-03-22 ENCOUNTER — PATIENT MESSAGE (OUTPATIENT)
Dept: INTERNAL MEDICINE | Facility: CLINIC | Age: 78
End: 2023-03-22
Payer: MEDICARE

## 2023-03-30 ENCOUNTER — TELEPHONE (OUTPATIENT)
Dept: INTERNAL MEDICINE | Facility: CLINIC | Age: 78
End: 2023-03-30
Payer: MEDICARE

## 2023-03-30 NOTE — TELEPHONE ENCOUNTER
----- Message from Rena Leahy sent at 3/30/2023  1:23 PM CDT -----  Contact: ANGELA BANUELOS [[id number]] [phone]  Type:  Needs Medical Advice    Who Called: Michaela Miguel  Symptoms (please be specific): Patient had a fall yesterday, 3/29/23. Pt has history of lower back pain and indicates it exacerbated her back pain, now 9/10/23. Quality is an aching pain.   How long has patient had these symptoms: Since 3/29/23  Would the patient rather a call back or a response via [facility]? Phone call   Best Call Back Number: [phone]

## 2023-03-31 ENCOUNTER — EXTERNAL CHRONIC CARE MANAGEMENT (OUTPATIENT)
Dept: PRIMARY CARE CLINIC | Facility: CLINIC | Age: 78
End: 2023-03-31
Payer: MEDICARE

## 2023-03-31 PROCEDURE — 99489 PR COMPLX CHRON CARE MGMT, EA ADDTL 30 MIN, PER MONTH: ICD-10-PCS | Mod: S$PBB,,, | Performed by: INTERNAL MEDICINE

## 2023-03-31 PROCEDURE — 99489 CPLX CHRNC CARE EA ADDL 30: CPT | Mod: PBBFAC,PO | Performed by: INTERNAL MEDICINE

## 2023-03-31 PROCEDURE — 99487 CPLX CHRNC CARE 1ST 60 MIN: CPT | Mod: S$PBB,,, | Performed by: INTERNAL MEDICINE

## 2023-03-31 PROCEDURE — 99489 CPLX CHRNC CARE EA ADDL 30: CPT | Mod: S$PBB,,, | Performed by: INTERNAL MEDICINE

## 2023-03-31 PROCEDURE — 99487 CPLX CHRNC CARE 1ST 60 MIN: CPT | Mod: PBBFAC,PO | Performed by: INTERNAL MEDICINE

## 2023-03-31 PROCEDURE — 99487 PR COMPLX CHRON CARE MGMT, 1ST HR, PER MONTH: ICD-10-PCS | Mod: S$PBB,,, | Performed by: INTERNAL MEDICINE

## 2023-04-18 ENCOUNTER — PATIENT MESSAGE (OUTPATIENT)
Dept: INTERNAL MEDICINE | Facility: CLINIC | Age: 78
End: 2023-04-18
Payer: MEDICARE

## 2023-04-30 ENCOUNTER — EXTERNAL CHRONIC CARE MANAGEMENT (OUTPATIENT)
Dept: PRIMARY CARE CLINIC | Facility: CLINIC | Age: 78
End: 2023-04-30
Payer: MEDICARE

## 2023-04-30 PROCEDURE — 99439 CHRNC CARE MGMT STAF EA ADDL: CPT | Mod: PBBFAC,27,PO | Performed by: INTERNAL MEDICINE

## 2023-04-30 PROCEDURE — 99439 CHRNC CARE MGMT STAF EA ADDL: CPT | Mod: S$PBB,,, | Performed by: INTERNAL MEDICINE

## 2023-04-30 PROCEDURE — 99490 CHRNC CARE MGMT STAFF 1ST 20: CPT | Mod: PBBFAC,25,PO | Performed by: INTERNAL MEDICINE

## 2023-04-30 PROCEDURE — 99490 PR CHRONIC CARE MGMT, 1ST 20 MIN: ICD-10-PCS | Mod: S$PBB,,, | Performed by: INTERNAL MEDICINE

## 2023-04-30 PROCEDURE — 99439 PR CHRONIC CARE MGMT, EA ADDTL 20 MIN: ICD-10-PCS | Mod: S$PBB,,, | Performed by: INTERNAL MEDICINE

## 2023-04-30 PROCEDURE — 99490 CHRNC CARE MGMT STAFF 1ST 20: CPT | Mod: S$PBB,,, | Performed by: INTERNAL MEDICINE

## 2023-05-01 RX ORDER — NIFEDIPINE 90 MG/1
90 TABLET, EXTENDED RELEASE ORAL DAILY
Qty: 90 TABLET | Refills: 3 | Status: SHIPPED | OUTPATIENT
Start: 2023-05-01

## 2023-05-02 ENCOUNTER — TELEPHONE (OUTPATIENT)
Dept: INTERNAL MEDICINE | Facility: CLINIC | Age: 78
End: 2023-05-02
Payer: MEDICARE

## 2023-05-02 NOTE — TELEPHONE ENCOUNTER
Returned call, vm left and given the name of Evan Virgen who sees geriatrics. Phone number for clinic given too.

## 2023-05-02 NOTE — TELEPHONE ENCOUNTER
----- Message from Taryn Villa sent at 5/2/2023 12:49 PM CDT -----  Regarding: Questions and concerns  Contact: [phone] x-[phone]..  Wilmar dao/Guido Miguel [phone] [phone]..     Manuel has messaged the office on 4/18 in ref to finding a gerontologist. Please call Manuel at [phone]    Thank You

## 2023-05-10 DIAGNOSIS — I10 ESSENTIAL HYPERTENSION: ICD-10-CM

## 2023-05-10 RX ORDER — HYDROCHLOROTHIAZIDE 25 MG/1
25 TABLET ORAL DAILY
Qty: 90 TABLET | Refills: 3 | Status: SHIPPED | OUTPATIENT
Start: 2023-05-10

## 2023-05-10 NOTE — TELEPHONE ENCOUNTER
Refill Routing Note   Medication(s) are not appropriate for processing by [facility] for the following reason(s):      Non-participating provider    [facility] action(s):  Route None identified          Appointments  past 12m or future 3m with PCP    Date Provider   Last Visit   7/8/2022 Anh Atkinson MD   Next Visit   6/30/2023 Anh Atkinson MD   ED visits in past 90 days: 0        Note composed:1:18 PM 05/10/2023

## 2023-05-16 NOTE — PROGRESS NOTES
Subjective:       Patient ID: Val Bragg is a 72 y.o. female.    Chief Complaint: Cataract (Cataract Eval per Dr. Blanchard )    HPI     Cataract    Additional comments: Cataract Eval per Dr. Blanchard            Comments   Cataract Eval per Dr. Blanchard   Denies eye pain and flashes. H/o floaters bilateral.   No itching, burning or tearing.   Blurred vision at distance w/o correction. With glasses vision is okay.   Sometimes have trouble with glare.     Meds: No gtt        Last edited by KYRIE Coffey on 4/9/2018  1:43 PM. (History)             Assessment:       1. Nuclear sclerosis, bilateral    2. Alternating exotropia    3. 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 Essential hypertension    5. Refractive error        Plan:       Visually significant cataract OS -Pt. Wants Sx.     Cataract OD- Not visually significant.  DM-No NPDR OU.  HTN-No retinopathy OU.  RE-Pt does not want Toric IOL.          Cataract Surgery Consent: Patient with a visually significant cataract with difficulties of ADLs, reading, driving, night vision, glare (any and all).  Discussed with Patient/Family/Caregiver: options, risks and benefits, expectations of cataract surgery, utilized an eye model with questions and answers to facilitate discussion.  Discussed lens options and patient understands that glasses may be required for optimal vision for distance and/or near vision after cataract surgery.  The Patient/Family/Caregiver  voice good understanding and patient wishes to proceed with surgery.  The patient will likely benefit from surgery and patient signed consent for Left Eye.  CE OS 4/24/18 SN60WF 20.0.  Control DM & HTN.   Dupixent Counseling: I discussed with the patient the risks of dupilumab including but not limited to eye infection and irritation, cold sores, injection site reactions, worsening of asthma, allergic reactions and increased risk of parasitic infection.  Live vaccines should be avoided while taking dupilumab. Dupilumab will also interact with certain medications such as warfarin and cyclosporine. The patient understands that monitoring is required and they must alert us or the primary physician if symptoms of infection or other concerning signs are noted.

## 2023-05-18 DIAGNOSIS — G47.00 INSOMNIA, UNSPECIFIED TYPE: ICD-10-CM

## 2023-05-18 RX ORDER — LOSARTAN POTASSIUM 50 MG/1
50 TABLET ORAL DAILY
Qty: 30 TABLET | Refills: 3 | Status: SHIPPED | OUTPATIENT
Start: 2023-05-18 | End: 2023-09-24 | Stop reason: SDUPTHER

## 2023-05-18 RX ORDER — TRAZODONE HYDROCHLORIDE 150 MG/1
TABLET ORAL
Qty: 90 TABLET | Refills: 3 | Status: SHIPPED | OUTPATIENT
Start: 2023-05-18

## 2023-05-26 DIAGNOSIS — G47.00 INSOMNIA, UNSPECIFIED TYPE: ICD-10-CM

## 2023-05-26 RX ORDER — DIAZEPAM 5 MG/1
5 TABLET ORAL NIGHTLY
Qty: 30 TABLET | Refills: 3 | Status: SHIPPED | OUTPATIENT
Start: 2023-05-26 | End: 2023-10-08 | Stop reason: SDUPTHER

## 2023-05-31 ENCOUNTER — EXTERNAL CHRONIC CARE MANAGEMENT (OUTPATIENT)
Dept: PRIMARY CARE CLINIC | Facility: CLINIC | Age: 78
End: 2023-05-31
Payer: MEDICARE

## 2023-05-31 PROCEDURE — 99490 PR CHRONIC CARE MGMT, 1ST 20 MIN: ICD-10-PCS | Mod: S$PBB,,, | Performed by: INTERNAL MEDICINE

## 2023-05-31 PROCEDURE — 99490 CHRNC CARE MGMT STAFF 1ST 20: CPT | Mod: PBBFAC,25,PO | Performed by: INTERNAL MEDICINE

## 2023-05-31 PROCEDURE — 99439 CHRNC CARE MGMT STAF EA ADDL: CPT | Mod: S$PBB,,, | Performed by: INTERNAL MEDICINE

## 2023-05-31 PROCEDURE — 99439 PR CHRONIC CARE MGMT, EA ADDTL 20 MIN: ICD-10-PCS | Mod: S$PBB,,, | Performed by: INTERNAL MEDICINE

## 2023-05-31 PROCEDURE — 99490 CHRNC CARE MGMT STAFF 1ST 20: CPT | Mod: S$PBB,,, | Performed by: INTERNAL MEDICINE

## 2023-05-31 PROCEDURE — 99439 CHRNC CARE MGMT STAF EA ADDL: CPT | Mod: PBBFAC,27,PO | Performed by: INTERNAL MEDICINE

## 2023-06-02 ENCOUNTER — PATIENT MESSAGE (OUTPATIENT)
Dept: ADMINISTRATIVE | Facility: HOSPITAL | Age: 78
End: 2023-06-02
Payer: MEDICARE

## 2023-06-21 ENCOUNTER — PATIENT MESSAGE (OUTPATIENT)
Dept: INTERNAL MEDICINE | Facility: CLINIC | Age: 78
End: 2023-06-21
Payer: MEDICARE

## 2023-06-22 ENCOUNTER — PATIENT OUTREACH (OUTPATIENT)
Dept: ADMINISTRATIVE | Facility: HOSPITAL | Age: 78
End: 2023-06-22
Payer: MEDICARE

## 2023-06-22 NOTE — PROGRESS NOTES
Health Maintenance Due   Topic Date Due    Hepatitis C Screening  Never done    TETANUS VACCINE  Never done    Shingles Vaccine (2 of 2) 06/10/2019    Eye Exam  04/05/2020    Hemoglobin A1c  01/08/2023    DEXA Scan  01/15/2023    Diabetes Urine Screening  01/19/2023    Lipid Panel  01/19/2023     Chart reviewed.   Immunizations: Reconciled  Orders placed: N/A  Upcoming appts to satisfy JACINTO topics: NA

## 2023-06-30 ENCOUNTER — OFFICE VISIT (OUTPATIENT)
Dept: INTERNAL MEDICINE | Facility: CLINIC | Age: 78
End: 2023-06-30
Payer: MEDICARE

## 2023-06-30 ENCOUNTER — EXTERNAL CHRONIC CARE MANAGEMENT (OUTPATIENT)
Dept: PRIMARY CARE CLINIC | Facility: CLINIC | Age: 78
End: 2023-06-30
Payer: MEDICARE

## 2023-06-30 ENCOUNTER — LAB VISIT (OUTPATIENT)
Dept: LAB | Facility: HOSPITAL | Age: 78
End: 2023-06-30
Attending: INTERNAL MEDICINE
Payer: MEDICARE

## 2023-06-30 VITALS
WEIGHT: 205.25 LBS | TEMPERATURE: 97 F | HEIGHT: 64 IN | DIASTOLIC BLOOD PRESSURE: 76 MMHG | BODY MASS INDEX: 35.04 KG/M2 | SYSTOLIC BLOOD PRESSURE: 160 MMHG | OXYGEN SATURATION: 96 % | HEART RATE: 67 BPM | RESPIRATION RATE: 18 BRPM

## 2023-06-30 DIAGNOSIS — M47.816 LUMBAR SPONDYLOSIS: ICD-10-CM

## 2023-06-30 DIAGNOSIS — H53.9 CHANGES IN VISION: ICD-10-CM

## 2023-06-30 DIAGNOSIS — I10 ESSENTIAL HYPERTENSION: ICD-10-CM

## 2023-06-30 DIAGNOSIS — I10 ESSENTIAL HYPERTENSION: Primary | ICD-10-CM

## 2023-06-30 DIAGNOSIS — E11.9 CONTROLLED TYPE 2 DIABETES MELLITUS WITHOUT COMPLICATION, WITHOUT LONG-TERM CURRENT USE OF INSULIN: ICD-10-CM

## 2023-06-30 LAB
ALBUMIN SERPL BCP-MCNC: 4.1 G/DL (ref 3.5–5.2)
ALP SERPL-CCNC: 50 U/L (ref 55–135)
ALT SERPL W/O P-5'-P-CCNC: 39 U/L (ref 10–44)
ANION GAP SERPL CALC-SCNC: 13 MMOL/L (ref 8–16)
AST SERPL-CCNC: 47 U/L (ref 10–40)
BILIRUB SERPL-MCNC: 0.8 MG/DL (ref 0.1–1)
BUN SERPL-MCNC: 17 MG/DL (ref 8–23)
CALCIUM SERPL-MCNC: 9.8 MG/DL (ref 8.7–10.5)
CHLORIDE SERPL-SCNC: 102 MMOL/L (ref 95–110)
CO2 SERPL-SCNC: 26 MMOL/L (ref 23–29)
CREAT SERPL-MCNC: 1.9 MG/DL (ref 0.5–1.4)
EST. GFR  (NO RACE VARIABLE): 26.7 ML/MIN/1.73 M^2
ESTIMATED AVG GLUCOSE: 160 MG/DL (ref 68–131)
GLUCOSE SERPL-MCNC: 177 MG/DL (ref 70–110)
HBA1C MFR BLD: 7.2 % (ref 4–5.6)
POTASSIUM SERPL-SCNC: 3.3 MMOL/L (ref 3.5–5.1)
PROT SERPL-MCNC: 8 G/DL (ref 6–8.4)
SODIUM SERPL-SCNC: 141 MMOL/L (ref 136–145)
TSH SERPL DL<=0.005 MIU/L-ACNC: 1.4 UIU/ML (ref 0.4–4)

## 2023-06-30 PROCEDURE — 99215 OFFICE O/P EST HI 40 MIN: CPT | Mod: PBBFAC,PO | Performed by: INTERNAL MEDICINE

## 2023-06-30 PROCEDURE — 99439 CHRNC CARE MGMT STAF EA ADDL: CPT | Mod: S$PBB,,, | Performed by: INTERNAL MEDICINE

## 2023-06-30 PROCEDURE — 99439 CHRNC CARE MGMT STAF EA ADDL: CPT | Mod: PBBFAC,27,PO | Performed by: INTERNAL MEDICINE

## 2023-06-30 PROCEDURE — 99490 PR CHRONIC CARE MGMT, 1ST 20 MIN: ICD-10-PCS | Mod: S$PBB,,, | Performed by: INTERNAL MEDICINE

## 2023-06-30 PROCEDURE — 80053 COMPREHEN METABOLIC PANEL: CPT | Performed by: INTERNAL MEDICINE

## 2023-06-30 PROCEDURE — 83036 HEMOGLOBIN GLYCOSYLATED A1C: CPT | Performed by: INTERNAL MEDICINE

## 2023-06-30 PROCEDURE — 99490 CHRNC CARE MGMT STAFF 1ST 20: CPT | Mod: S$PBB,,, | Performed by: INTERNAL MEDICINE

## 2023-06-30 PROCEDURE — 36415 COLL VENOUS BLD VENIPUNCTURE: CPT | Mod: PO | Performed by: INTERNAL MEDICINE

## 2023-06-30 PROCEDURE — 99214 OFFICE O/P EST MOD 30 MIN: CPT | Mod: S$PBB,,, | Performed by: INTERNAL MEDICINE

## 2023-06-30 PROCEDURE — 99999 PR PBB SHADOW E&M-EST. PATIENT-LVL V: CPT | Mod: PBBFAC,,, | Performed by: INTERNAL MEDICINE

## 2023-06-30 PROCEDURE — 99214 PR OFFICE/OUTPT VISIT, EST, LEVL IV, 30-39 MIN: ICD-10-PCS | Mod: S$PBB,,, | Performed by: INTERNAL MEDICINE

## 2023-06-30 PROCEDURE — 99439 PR CHRONIC CARE MGMT, EA ADDTL 20 MIN: ICD-10-PCS | Mod: S$PBB,,, | Performed by: INTERNAL MEDICINE

## 2023-06-30 PROCEDURE — 99490 CHRNC CARE MGMT STAFF 1ST 20: CPT | Mod: PBBFAC,PO | Performed by: INTERNAL MEDICINE

## 2023-06-30 PROCEDURE — 84443 ASSAY THYROID STIM HORMONE: CPT | Performed by: INTERNAL MEDICINE

## 2023-06-30 PROCEDURE — 99999 PR PBB SHADOW E&M-EST. PATIENT-LVL V: ICD-10-PCS | Mod: PBBFAC,,, | Performed by: INTERNAL MEDICINE

## 2023-06-30 RX ORDER — HYDROCORTISONE 25 MG/G
CREAM TOPICAL 2 TIMES DAILY
Qty: 30 G | Refills: 1 | Status: SHIPPED | OUTPATIENT
Start: 2023-06-30 | End: 2023-12-22 | Stop reason: SDUPTHER

## 2023-06-30 NOTE — PROGRESS NOTES
CC:  Annual review of chronic medical problems   HPI:   The patient is a 77 y.o. old female who presents to the office for annual review of chronic medical problems.  She reports multiple falls.    PAST MEDICAL HISTORY  Past Medical History:   Diagnosis Date    Alternating exotropia 4/18/2013    Colon polyps 8/13/2019    GERD (gastroesophageal reflux disease)     Hyperlipidemia     Lumbar spondylosis 4/16/2021    Menopausal state     Multiple gastric polyps     on EGD in 2004    Pneumonia of right middle lobe due to infectious organism 3/19/2019    Right middle lobe pneumonia 3/19/2019    Sickle cell trait        SURGICAL HISTORY:  Past Surgical History:   Procedure Laterality Date    CATARACT EXTRACTION W/  INTRAOCULAR LENS IMPLANT Left 06/05/2018    Dr. Castillo    CHOLECYSTECTOMY      laproscopic cholecystectomy    COLONOSCOPY N/A 8/13/2019    Procedure: COLONOSCOPY;  Surgeon: Jeffrey Maynard MD;  [facility]  Service: Endoscopy;  Laterality: N/A;    ESOPHAGOGASTRODUODENOSCOPY N/A 8/13/2019    Procedure: ESOPHAGOGASTRODUODENOSCOPY (EGD);  Surgeon: Jeffrey Maynard MD;  Location: [facility] ([address] [place] [address]);  Service: Endoscopy;  Laterality: N/A;    HYSTERECTOMY      DENISHA/BSO - fibroids    INJECTION OF ANESTHETIC AGENT AROUND MEDIAL BRANCH NERVES INNERVATING LUMBAR FACET JOINT Bilateral 5/27/2021    Procedure: Block-nerve-medial branch-lumbar--Bilateral L4-5, L5-S1;  Surgeon: Ashish Ren Jr., MD;  [place] [facility] [place]  Service: Pain Management;  Laterality: Bilateral;    INJECTION OF ANESTHETIC AGENT AROUND MEDIAL BRANCH NERVES INNERVATING LUMBAR FACET JOINT Bilateral 6/10/2021    Procedure: Block-nerve-medial branch-lumbar--bilateral-- L4-5, L5-S1;  Surgeon: Ashish Ren Jr., MD;  Location: [facility] PAIN T;  Service: Pain Management;  Laterality: Bilateral;  patient is diabetic    INTRAOCULAR PROSTHESES INSERTION Left 6/5/2018    Procedure: INSERTION-INTRAOCULAR LENS (IOL);  Surgeon: Ga SUAREZ  MD Anna;  Location: [facility] OR [address] [place] [address];  Service: Ophthalmology;  Laterality: Left;    Laparoscopic right hemicolectomy      OOPHORECTOMY      PHACOEMULSIFICATION OF CATARACT Left 6/5/2018    Procedure: PHACOEMULSIFICATION-ASPIRATION-CATARACT;  Surgeon: Ga Castillo MD;  Location: [facility] OR [address] [place] [address];  Service: Ophthalmology;  Laterality: Left;         MEDS:  Medcard reviewed and updated    ALLERGIES: Allergy Card reviewed and updated    SOCIAL HISTORY:   The patient is a nonsmoker, denies alcohol or illicit drug use.    ROS:  GENERAL: No fever, chills, fatigability or weight loss.  Freq Uent falls  SKIN: No rashes.  HEAD: Positive headaches, soreness.  Denies recent head trauma.  EYES: No photophobia, ocular pain or diplopia.  Positive changes in vision.  EARS: Denies ear pain, discharge or vertigo.  NOSE: No epistaxis.  Positive postnasal drip.  MOUTH & THROAT: No hoarseness or change in voice.   NODES: Denies swollen glands.  CHEST: Denies shortness of breath or wheezing.  Positive cough.  CARDIOVASCULAR: Denies chest pain or palpitations.  ABDOMEN: Appetite fine. Positive diarrhea.  Denies abdominal pain, constipation or blood in stool.  URINARY: No dysuria or hematuria.  Positive incontinence.  MUSCULOSKELETAL: No joint stiffness or swelling. Positive back pain.  NEUROLOGIC: No history of seizures.  Positive tremor.  ENDOCRINE: Denies polyuria or polydipsia.  PSYCHIATRIC: Denies mood swings, depression, anxiety, homicidal or suicidal thoughts.    SCREENINGS:  Last cholesterol:2022 .  Last colonoscopy: 2019  Last mammogram: 2019  Last Pap smear: several years ago  Last tetanus: unknown  Last Pneumovax: 2010/ 2018  Last eye exam: 2019  Last bone density: 2019  Last menstrual period: postmenopausal    PE:   Vitals:  Vitals:    06/30/23 1427   BP: (!) 160/96   Pulse: 67   Resp: 18   Temp: 97.4 °F (36.3 °C)       APPEARANCE: Well nourished, well developed, in no acute distress.    EYES: Sclerae anicteric.  PERRL. EOMI.      EARS: TM's intact. No retraction or perforation.    NOSE: Mucosa pink. Airway clear.  MOUTH & THROAT: No tonsillar enlargement. No pharyngeal erythema or exudate. No stridor.  NECK: Supple, no thyromegaly.  CHEST: Lungs clear to auscultation with unlabored respirations.  CARDIOVASCULAR: Normal S1, S2. No murmurs. No carotid bruits. No pedal edema.  ABDOMEN: Bowel sounds normal. Not distended. Soft. No tenderness or masses.   MUSCULOSKELETAL:  In wheelchair.   SKIN: Normal skin turgor, warm and dry.  NEUROLOGIC: Cranial Nerves: Intact.  PSYCHIATRIC: The patient is oriented to person, place, and time and has a pleasant affect.        ASSESSMENT/PLAN:  Val was seen today for depression.    Diagnoses and all orders for this visit:    Essential hypertension  -     Comprehensive Metabolic Panel; Future  -     TSH; Future  -      blood pressure is elevated, but improving    Changes in vision  -     Ambulatory referral/consult to Optometry; Future    Controlled type 2 diabetes mellitus without complication, without long-term current use of insulin  -     Hemoglobin A1C; Future    Lumbar spondylosis  -     Ambulatory referral/consult to Home Health; Future    Other orders  -     hydrocortisone 2.5 % cream; Apply topically 2 (two) times daily.

## 2023-07-03 DIAGNOSIS — E11.9 DM TYPE 2 WITHOUT RETINOPATHY: ICD-10-CM

## 2023-07-03 RX ORDER — METFORMIN HYDROCHLORIDE 500 MG/1
500 TABLET ORAL
Qty: 90 TABLET | Refills: 1 | Status: CANCELLED | OUTPATIENT
Start: 2023-07-03

## 2023-07-03 NOTE — TELEPHONE ENCOUNTER
Care Due:                  Date            Visit Type   Department     Provider  --------------------------------------------------------------------------------                                MYCHART                              ANNUAL                              CHECKUP/PHY  [facility] INTERNAL  Last Visit: 06-      [facility]       Anh Atkinson                              [facility]                              ANNUAL                              CHECKUP/PHY  [facility] INTERNAL  Next Visit: 09-      [facility]       Anh Atkinson                                                            Last  Test          Frequency    Reason                     Performed    Due Date  --------------------------------------------------------------------------------    Lipid Panel.  12 months..  lovastatin...............  01- 01-    [facility] Embedded Care Due Messages. Reference number: [id number].   7/03/2023 10:46:44 AM CDT

## 2023-07-04 NOTE — TELEPHONE ENCOUNTER
Refill Routing Note   Medication(s) are not appropriate for processing by [facility] for the following reason(s):      Required labs abnormal    [facility] action(s):  Defer Labs due            Appointments  past 12m or future 3m with PCP    Date Provider   Last Visit   6/30/2023 Anh Atkinson MD   Next Visit   9/13/2023 Anh Atkinson MD   ED visits in past 90 days: 0        Note composed:11:31 PM 07/03/2023

## 2023-07-07 PROCEDURE — G0180 PR HOME HEALTH MD CERTIFICATION: ICD-10-PCS | Mod: ,,, | Performed by: INTERNAL MEDICINE

## 2023-07-07 PROCEDURE — G0180 MD CERTIFICATION HHA PATIENT: HCPCS | Mod: ,,, | Performed by: INTERNAL MEDICINE

## 2023-07-10 DIAGNOSIS — E11.9 DM TYPE 2 WITHOUT RETINOPATHY: ICD-10-CM

## 2023-07-10 RX ORDER — METFORMIN HYDROCHLORIDE 500 MG/1
500 TABLET ORAL
Qty: 90 TABLET | Refills: 1 | Status: CANCELLED | OUTPATIENT
Start: 2023-07-03

## 2023-07-10 NOTE — TELEPHONE ENCOUNTER
Refill Routing Note   Medication(s) are not appropriate for processing by [facility] for the following reason(s):       Required labs abnormal    [facility] action(s):  Defer           Appointments  past 12m or future 3m with PCP    Date Provider   Last Visit   6/30/2023 Anh Atkinson MD   Next Visit   9/13/2023 Anh Atkinson MD   ED visits in past 90 days: 0        Note composed:10:11 AM 07/10/2023

## 2023-07-10 NOTE — TELEPHONE ENCOUNTER
Refill Routing Note   Medication(s) are not appropriate for processing by [facility] for the following reason(s):      Required labs abnormal  GFR of 26 on 6/30/23; metformin is contraindicated with GFR less than 30; deferred to pcp for review    [facility] action(s):  Defer None identified     Medication Therapy Plan: GFR of 26 on 6/30/23; metformin is contraindicated with GFR less than 30; deferred to pcp for review    Extended chart review required: Yes     Appointments  past 12m or future 3m with PCP    Date Provider   Last Visit   6/30/2023 Anh Atkinson MD   Next Visit   9/13/2023 Anh Atkinson MD   ED visits in past 90 days: 0        Note composed:10:16 AM 07/10/2023

## 2023-07-10 NOTE — TELEPHONE ENCOUNTER
No care due was identified.  [facility] Embedded Care Due Messages. Reference number: [id number].   7/10/2023 9:36:42 AM CDT

## 2023-07-11 ENCOUNTER — TELEPHONE (OUTPATIENT)
Dept: INTERNAL MEDICINE | Facility: CLINIC | Age: 78
End: 2023-07-11
Payer: MEDICARE

## 2023-07-11 DIAGNOSIS — E11.22 TYPE 2 DIABETES MELLITUS WITH STAGE 4 CHRONIC KIDNEY DISEASE, WITHOUT LONG-TERM CURRENT USE OF INSULIN: ICD-10-CM

## 2023-07-11 DIAGNOSIS — I10 ESSENTIAL HYPERTENSION: Primary | ICD-10-CM

## 2023-07-11 DIAGNOSIS — E11.9 DM TYPE 2 WITHOUT RETINOPATHY: ICD-10-CM

## 2023-07-11 DIAGNOSIS — N18.4 TYPE 2 DIABETES MELLITUS WITH STAGE 4 CHRONIC KIDNEY DISEASE, WITHOUT LONG-TERM CURRENT USE OF INSULIN: ICD-10-CM

## 2023-07-11 RX ORDER — INSULIN DETEMIR 100 [IU]/ML
10 INJECTION, SOLUTION SUBCUTANEOUS NIGHTLY
Qty: 9 ML | Refills: 3 | Status: SHIPPED | OUTPATIENT
Start: 2023-07-11 | End: 2023-07-11

## 2023-07-11 RX ORDER — METFORMIN HYDROCHLORIDE 500 MG/1
500 TABLET ORAL
Qty: 90 TABLET | Refills: 1 | OUTPATIENT
Start: 2023-07-11

## 2023-07-11 RX ORDER — INSULIN DETEMIR 100 [IU]/ML
10 INJECTION, SOLUTION SUBCUTANEOUS NIGHTLY
Qty: 15 ML | Refills: 3 | Status: SHIPPED | OUTPATIENT
Start: 2023-07-11 | End: 2024-07-10

## 2023-07-11 NOTE — TELEPHONE ENCOUNTER
Spoke to pt  nurse Mariza w/ [facility] . Gave the verbal and then she requested a verbal for skilled nursing to instruct on the insulin. I gave verbal to do so. She also read back the med changes and gave a verbal understanding.    Lvm with pt to schedule lab appt.

## 2023-07-11 NOTE — TELEPHONE ENCOUNTER
Refill Routing Note   Medication(s) are not appropriate for processing by [facility] for the following reason(s):      Required labs abnormal    [facility] action(s):  Defer None identified     Medication Therapy Plan: GFR of 26 on 6/30/23; Metformin use contraindicated when GFR less than 30; deferred to pcp for review    Extended chart review required: Yes     Appointments  past 12m or future 3m with PCP    Date Provider   Last Visit   6/30/2023 Anh Atkinson MD   Next Visit   7/11/2023 Anh Atkinson MD   ED visits in past 90 days: 0        Note composed:2:41 PM 07/11/2023

## 2023-07-11 NOTE — TELEPHONE ENCOUNTER
Please give a verbal order for home health aide and advise if orders also required.  Unfortunately, losartan and hydrochlorothiazide cannot be combined at their current dosages.  50 mg of losartan can only be combined with 12.5 mg of hydrochlorothiazide.  However, we can consider decreasing the dose of hydrochlorothiazide, as long as Mrs. Bragg has close blood pressure monitoring.  Please inform the patient that her recent labs reveal a mildly elevated hemoglobin A1c, mildly elevated liver enzymes and persistently depressed kidney function.  I recommend discontinuation of metformin and decrease dose of Januvia due to kidney function.  I also recommend starting basal insulin, which will be once a day to control blood sugars and prevent worsening of kidney function.  I have sent a prescription for Levemir to the pharmacy electronically, as well as a new prescription for Januvia 25 mg.  She can start the new prescription once she has completed her current prescription.  Please ask if home health nurse can instruct her on insulin administration.  Please schedule labs at [facility] prior to her annual visit scheduled in September.  Please advise if patient would like to try the decreased dose of hydrochlorothiazide combined with losartan.

## 2023-07-11 NOTE — TELEPHONE ENCOUNTER
No care due was identified.  [facility] Embedded Care Due Messages. Reference number: [id number].   7/11/2023 11:43:40 AM CDT

## 2023-07-11 NOTE — TELEPHONE ENCOUNTER
Refill Routing Note   Medication(s) are not appropriate for processing by [facility] for the following reason(s):      Required labs abnormal    [facility] action(s):  Defer None identified        Pharmacist review requested: Yes     Appointments  past 12m or future 3m with PCP    Date Provider   Last Visit   6/30/2023 Anh Atkinson MD   Next Visit   7/11/2023 Anh Atkinson MD   ED visits in past 90 days: 0        Note composed:1:00 PM 07/11/2023

## 2023-07-11 NOTE — TELEPHONE ENCOUNTER
----- Message from Rashida Corcoran sent at 7/11/2023  9:34 AM CDT -----  Contact: Mariza dao/ [facility]   Mariza with  is calling in regards to getting some orders for the pt for a HH Aid to help until the pt gets her strength back.  Pt is also complaining of taking too many medications. Mariza is asking if the doctor can combine the pts hydroCHLOROthiazide (HYDRODIURIL) 25 MG tablet with  losartan (COZAAR) 50 MG tablet so it is one pill and metFORMIN (GLUCOPHAGE) 500 MG tablet with SITagliptin phosphate (JANUVIA) 100 MG Tab. Please call and advise.      Type: Orders Request    What orders/ testing are being requested? HH Aid    Is there a future appointment scheduled for the patient with PCP? yes    When?09/13/2023    Would you prefer a response via Lys[facility]? Call Back Please

## 2023-07-14 RX ORDER — PEN NEEDLE, DIABETIC 30 GX3/16"
1 NEEDLE, DISPOSABLE MISCELLANEOUS DAILY
Qty: 90 EACH | Refills: 3 | Status: SHIPPED | OUTPATIENT
Start: 2023-07-14

## 2023-07-19 ENCOUNTER — DOCUMENT SCAN (OUTPATIENT)
Dept: HOME HEALTH SERVICES | Facility: HOSPITAL | Age: 78
End: 2023-07-19
Payer: MEDICARE

## 2023-07-26 DIAGNOSIS — M41.9 SCOLIOSIS OF LUMBAR SPINE, UNSPECIFIED SCOLIOSIS TYPE: ICD-10-CM

## 2023-07-26 NOTE — TELEPHONE ENCOUNTER
Refill Routing Note   Medication(s) are not appropriate for processing by [facility] for the following reason(s):      Medication outside of protocol    [facility] action(s):  Route Care Due:  None identified            Appointments  past 12m or future 3m with PCP    Date Provider   Last Visit   6/30/2023 Anh Atkinson MD   Next Visit   9/13/2023 Anh Atkinson MD   ED visits in past 90 days: 0        Note composed:3:46 PM 07/26/2023

## 2023-07-28 RX ORDER — BACLOFEN 10 MG/1
10 TABLET ORAL 2 TIMES DAILY
Qty: 60 TABLET | Refills: 3 | Status: SHIPPED | OUTPATIENT
Start: 2023-07-28

## 2023-07-28 NOTE — TELEPHONE ENCOUNTER
Please inform patient that I have decreased her baclofen does to 10 mg twice a day secondary to her kidney function.

## 2023-07-31 ENCOUNTER — EXTERNAL CHRONIC CARE MANAGEMENT (OUTPATIENT)
Dept: PRIMARY CARE CLINIC | Facility: CLINIC | Age: 78
End: 2023-07-31
Payer: MEDICARE

## 2023-07-31 PROCEDURE — 99439 CHRNC CARE MGMT STAF EA ADDL: CPT | Mod: S$PBB,,, | Performed by: INTERNAL MEDICINE

## 2023-07-31 PROCEDURE — 99439 PR CHRONIC CARE MGMT, EA ADDTL 20 MIN: ICD-10-PCS | Mod: S$PBB,,, | Performed by: INTERNAL MEDICINE

## 2023-07-31 PROCEDURE — 99439 CHRNC CARE MGMT STAF EA ADDL: CPT | Mod: PBBFAC,27,PO | Performed by: INTERNAL MEDICINE

## 2023-07-31 PROCEDURE — 99490 CHRNC CARE MGMT STAFF 1ST 20: CPT | Mod: PBBFAC,25,PO | Performed by: INTERNAL MEDICINE

## 2023-07-31 PROCEDURE — 99490 CHRNC CARE MGMT STAFF 1ST 20: CPT | Mod: S$PBB,,, | Performed by: INTERNAL MEDICINE

## 2023-07-31 PROCEDURE — 99490 PR CHRONIC CARE MGMT, 1ST 20 MIN: ICD-10-PCS | Mod: S$PBB,,, | Performed by: INTERNAL MEDICINE

## 2023-08-03 ENCOUNTER — DOCUMENT SCAN (OUTPATIENT)
Dept: HOME HEALTH SERVICES | Facility: HOSPITAL | Age: 78
End: 2023-08-03
Payer: MEDICARE

## 2023-08-11 ENCOUNTER — EXTERNAL HOME HEALTH (OUTPATIENT)
Dept: HOME HEALTH SERVICES | Facility: HOSPITAL | Age: 78
End: 2023-08-11
Payer: MEDICARE

## 2023-08-14 ENCOUNTER — TELEPHONE (OUTPATIENT)
Dept: INTERNAL MEDICINE | Facility: CLINIC | Age: 78
End: 2023-08-14
Payer: MEDICARE

## 2023-08-14 NOTE — TELEPHONE ENCOUNTER
----- Message from Jo Reina sent at 8/14/2023 11:04 AM CDT -----  Contact: [phone]  [email or web address]     Patient is calling for Medical Advice regarding:    How long has patient had these symptoms:    Pharmacy name and phone#:    Would like response via mychart: call back    Comments: Garret MILAN is calling to request an extension of [facility] therapy visits for 2 times a week for 3 weeks

## 2023-08-14 NOTE — TELEPHONE ENCOUNTER
Called Garret from [facility] back and gave verbal for extension of Physical therapy visits for 2 times a week for 3 weeks

## 2023-08-21 ENCOUNTER — DOCUMENT SCAN (OUTPATIENT)
Dept: HOME HEALTH SERVICES | Facility: HOSPITAL | Age: 78
End: 2023-08-21
Payer: MEDICARE

## 2023-08-31 ENCOUNTER — EXTERNAL CHRONIC CARE MANAGEMENT (OUTPATIENT)
Dept: PRIMARY CARE CLINIC | Facility: CLINIC | Age: 78
End: 2023-08-31
Payer: MEDICARE

## 2023-08-31 PROCEDURE — 99439 CHRNC CARE MGMT STAF EA ADDL: CPT | Mod: PBBFAC,PO | Performed by: INTERNAL MEDICINE

## 2023-08-31 PROCEDURE — 99439 PR CHRONIC CARE MGMT, EA ADDTL 20 MIN: ICD-10-PCS | Mod: S$PBB,,, | Performed by: INTERNAL MEDICINE

## 2023-08-31 PROCEDURE — 99490 PR CHRONIC CARE MGMT, 1ST 20 MIN: ICD-10-PCS | Mod: S$PBB,,, | Performed by: INTERNAL MEDICINE

## 2023-08-31 PROCEDURE — 99439 CHRNC CARE MGMT STAF EA ADDL: CPT | Mod: S$PBB,,, | Performed by: INTERNAL MEDICINE

## 2023-08-31 PROCEDURE — 99490 CHRNC CARE MGMT STAFF 1ST 20: CPT | Mod: S$PBB,,, | Performed by: INTERNAL MEDICINE

## 2023-08-31 PROCEDURE — 99490 CHRNC CARE MGMT STAFF 1ST 20: CPT | Mod: PBBFAC,25,PO | Performed by: INTERNAL MEDICINE

## 2023-09-02 DIAGNOSIS — I10 ESSENTIAL HYPERTENSION: ICD-10-CM

## 2023-09-02 DIAGNOSIS — I48.0 PAROXYSMAL ATRIAL FIBRILLATION: ICD-10-CM

## 2023-09-02 DIAGNOSIS — N18.32 STAGE 3B CHRONIC KIDNEY DISEASE: ICD-10-CM

## 2023-09-03 NOTE — TELEPHONE ENCOUNTER
Care Due:                  Date            Visit Type   Department     Provider  --------------------------------------------------------------------------------                                MYCHART                              ANNUAL                              CHECKUP/PHY  [facility] INTERNAL  Last Visit: 06-      [facility]       Anh Atkinson                              [facility]                              ANNUAL                              CHECKUP/PHY  [facility] INTERNAL  Next Visit: 09-      [facility]       Anh Atkinson                                                            Last  Test          Frequency    Reason                     Performed    Due Date  --------------------------------------------------------------------------------    Lipid Panel.  12 months..  lovastatin...............  01- 01-    [facility] Embedded Care Due Messages. Reference number: [id number].   9/02/2023 10:44:12 PM CDT

## 2023-09-04 NOTE — TELEPHONE ENCOUNTER
Refill Routing Note   Medication(s) are not appropriate for processing by [facility] for the following reason(s):      Required vitals abnormal    [facility] action(s):  Defer Care Due:  Labs due            Appointments  past 12m or future 3m with PCP    Date Provider   Last Visit   6/30/2023 Anh Atkinson MD   Next Visit   9/13/2023 Anh Atkinson MD   ED visits in past 90 days: 0        Note composed:8:41 AM 09/04/2023

## 2023-09-05 RX ORDER — METOPROLOL SUCCINATE 25 MG/1
25 TABLET, EXTENDED RELEASE ORAL DAILY
Qty: 90 TABLET | Refills: 3 | Status: SHIPPED | OUTPATIENT
Start: 2023-09-05

## 2023-09-12 DIAGNOSIS — N18.32 STAGE 3B CHRONIC KIDNEY DISEASE: ICD-10-CM

## 2023-09-12 DIAGNOSIS — I48.0 PAROXYSMAL ATRIAL FIBRILLATION: ICD-10-CM

## 2023-09-12 RX ORDER — RIVAROXABAN 15 MG/1
15 TABLET, FILM COATED ORAL
Qty: 30 TABLET | Refills: 11 | Status: CANCELLED | OUTPATIENT
Start: 2023-09-12 | End: 2024-09-11

## 2023-09-12 RX ORDER — RIVAROXABAN 15 MG/1
15 TABLET, FILM COATED ORAL
Qty: 30 TABLET | Refills: 11 | Status: CANCELLED | OUTPATIENT
Start: 2023-09-02 | End: 2024-09-01

## 2023-09-20 DIAGNOSIS — Z78.0 MENOPAUSE: ICD-10-CM

## 2023-09-20 RX ORDER — FLASH GLUCOSE SENSOR
1 KIT MISCELLANEOUS
Qty: 2 KIT | Refills: 11 | Status: SHIPPED | OUTPATIENT
Start: 2023-09-20

## 2023-09-25 RX ORDER — LOSARTAN POTASSIUM 50 MG/1
50 TABLET ORAL DAILY
Qty: 90 TABLET | Refills: 3 | Status: SHIPPED | OUTPATIENT
Start: 2023-09-25 | End: 2024-09-24

## 2023-09-25 NOTE — TELEPHONE ENCOUNTER
No care due was identified.  Health [facility] Embedded Care Due Messages. Reference number: [id number].   9/24/2023 8:15:14 PM CDT

## 2023-09-30 ENCOUNTER — EXTERNAL CHRONIC CARE MANAGEMENT (OUTPATIENT)
Dept: PRIMARY CARE CLINIC | Facility: CLINIC | Age: 78
End: 2023-09-30
Payer: MEDICARE

## 2023-09-30 PROCEDURE — 99490 PR CHRONIC CARE MGMT, 1ST 20 MIN: ICD-10-PCS | Mod: S$PBB,,, | Performed by: INTERNAL MEDICINE

## 2023-09-30 PROCEDURE — 99490 CHRNC CARE MGMT STAFF 1ST 20: CPT | Mod: S$PBB,,, | Performed by: INTERNAL MEDICINE

## 2023-09-30 PROCEDURE — 99490 CHRNC CARE MGMT STAFF 1ST 20: CPT | Mod: PBBFAC,PO | Performed by: INTERNAL MEDICINE

## 2023-10-08 DIAGNOSIS — G47.00 INSOMNIA, UNSPECIFIED TYPE: ICD-10-CM

## 2023-10-09 RX ORDER — DIAZEPAM 5 MG/1
5 TABLET ORAL NIGHTLY
Qty: 30 TABLET | Refills: 3 | Status: SHIPPED | OUTPATIENT
Start: 2023-10-09 | End: 2024-02-03 | Stop reason: SDUPTHER

## 2023-10-09 NOTE — TELEPHONE ENCOUNTER
No care due was identified.  [facility] Embedded Care Due Messages. Reference number: [id number].   10/08/2023 10:32:40 PM CDT

## 2023-10-09 NOTE — TELEPHONE ENCOUNTER
Refill Routing Note   Medication(s) are not appropriate for processing by [facility] for the following reason(s):      Medication outside of protocol    [facility] action(s):  Route Care Due:  None identified            Appointments  past 12m or future 3m with PCP    Date Provider   Last Visit   6/30/2023 Anh Atkinson MD   Next Visit   12/22/2023 Anh Atkinson MD   ED visits in past 90 days: 0        Note composed:2:48 AM 10/09/2023

## 2023-10-30 ENCOUNTER — PATIENT MESSAGE (OUTPATIENT)
Dept: ADMINISTRATIVE | Facility: HOSPITAL | Age: 78
End: 2023-10-30
Payer: MEDICARE

## 2023-10-30 ENCOUNTER — PATIENT OUTREACH (OUTPATIENT)
Dept: ADMINISTRATIVE | Facility: HOSPITAL | Age: 78
End: 2023-10-30
Payer: MEDICARE

## 2023-10-31 ENCOUNTER — EXTERNAL CHRONIC CARE MANAGEMENT (OUTPATIENT)
Dept: PRIMARY CARE CLINIC | Facility: CLINIC | Age: 78
End: 2023-10-31
Payer: MEDICARE

## 2023-10-31 PROCEDURE — 99490 CHRNC CARE MGMT STAFF 1ST 20: CPT | Mod: PBBFAC,PO | Performed by: INTERNAL MEDICINE

## 2023-10-31 PROCEDURE — 99439 PR CHRONIC CARE MGMT, EA ADDTL 20 MIN: ICD-10-PCS | Mod: S$PBB,,, | Performed by: INTERNAL MEDICINE

## 2023-10-31 PROCEDURE — 99439 CHRNC CARE MGMT STAF EA ADDL: CPT | Mod: S$PBB,,, | Performed by: INTERNAL MEDICINE

## 2023-10-31 PROCEDURE — 99439 CHRNC CARE MGMT STAF EA ADDL: CPT | Mod: PBBFAC,PO | Performed by: INTERNAL MEDICINE

## 2023-10-31 PROCEDURE — 99490 CHRNC CARE MGMT STAFF 1ST 20: CPT | Mod: S$PBB,,, | Performed by: INTERNAL MEDICINE

## 2023-10-31 PROCEDURE — 99490 PR CHRONIC CARE MGMT, 1ST 20 MIN: ICD-10-PCS | Mod: S$PBB,,, | Performed by: INTERNAL MEDICINE

## 2023-11-30 ENCOUNTER — EXTERNAL CHRONIC CARE MANAGEMENT (OUTPATIENT)
Dept: PRIMARY CARE CLINIC | Facility: CLINIC | Age: 78
End: 2023-11-30
Payer: MEDICARE

## 2023-11-30 PROCEDURE — 99490 PR CHRONIC CARE MGMT, 1ST 20 MIN: ICD-10-PCS | Mod: S$PBB,,, | Performed by: INTERNAL MEDICINE

## 2023-11-30 PROCEDURE — 99490 CHRNC CARE MGMT STAFF 1ST 20: CPT | Mod: PBBFAC,PO | Performed by: INTERNAL MEDICINE

## 2023-11-30 PROCEDURE — 99490 CHRNC CARE MGMT STAFF 1ST 20: CPT | Mod: S$PBB,,, | Performed by: INTERNAL MEDICINE

## 2023-12-22 ENCOUNTER — OFFICE VISIT (OUTPATIENT)
Dept: INTERNAL MEDICINE | Facility: CLINIC | Age: 78
End: 2023-12-22
Payer: MEDICARE

## 2023-12-22 VITALS
SYSTOLIC BLOOD PRESSURE: 132 MMHG | HEART RATE: 59 BPM | OXYGEN SATURATION: 98 % | BODY MASS INDEX: 35.23 KG/M2 | HEIGHT: 64 IN | DIASTOLIC BLOOD PRESSURE: 62 MMHG | TEMPERATURE: 97 F

## 2023-12-22 DIAGNOSIS — E11.22 TYPE 2 DIABETES MELLITUS WITH STAGE 4 CHRONIC KIDNEY DISEASE, WITHOUT LONG-TERM CURRENT USE OF INSULIN: ICD-10-CM

## 2023-12-22 DIAGNOSIS — Z12.31 VISIT FOR SCREENING MAMMOGRAM: ICD-10-CM

## 2023-12-22 DIAGNOSIS — I10 ESSENTIAL HYPERTENSION: Primary | ICD-10-CM

## 2023-12-22 DIAGNOSIS — Z78.0 MENOPAUSE: ICD-10-CM

## 2023-12-22 DIAGNOSIS — N18.4 TYPE 2 DIABETES MELLITUS WITH STAGE 4 CHRONIC KIDNEY DISEASE, WITHOUT LONG-TERM CURRENT USE OF INSULIN: ICD-10-CM

## 2023-12-22 PROCEDURE — 99215 OFFICE O/P EST HI 40 MIN: CPT | Mod: PBBFAC,PO | Performed by: INTERNAL MEDICINE

## 2023-12-22 PROCEDURE — 99214 PR OFFICE/OUTPT VISIT, EST, LEVL IV, 30-39 MIN: ICD-10-PCS | Mod: S$PBB,,, | Performed by: INTERNAL MEDICINE

## 2023-12-22 PROCEDURE — 99999 PR PBB SHADOW E&M-EST. PATIENT-LVL V: ICD-10-PCS | Mod: PBBFAC,,, | Performed by: INTERNAL MEDICINE

## 2023-12-22 PROCEDURE — 99214 OFFICE O/P EST MOD 30 MIN: CPT | Mod: S$PBB,,, | Performed by: INTERNAL MEDICINE

## 2023-12-22 PROCEDURE — 99999 PR PBB SHADOW E&M-EST. PATIENT-LVL V: CPT | Mod: PBBFAC,,, | Performed by: INTERNAL MEDICINE

## 2023-12-22 RX ORDER — HYDROCORTISONE 25 MG/G
CREAM TOPICAL 2 TIMES DAILY
Qty: 30 G | Refills: 1 | Status: SHIPPED | OUTPATIENT
Start: 2023-12-22

## 2023-12-22 NOTE — PROGRESS NOTES
CC:  Annual review of chronic medical problems   HPI:   The patient is a 78 y.o. old female who presents to the office for annual review of chronic medical problems.    PAST MEDICAL HISTORY  Past Medical History:   Diagnosis Date    Alternating exotropia 4/18/2013    Colon polyps 8/13/2019    GERD (gastroesophageal reflux disease)     Hyperlipidemia     Lumbar spondylosis 4/16/2021    Menopausal state     Multiple gastric polyps     on EGD in 2004    Pneumonia of right middle lobe due to infectious organism 3/19/2019    Right middle lobe pneumonia 3/19/2019    Sickle cell trait        SURGICAL HISTORY:  Past Surgical History:   Procedure Laterality Date    CATARACT EXTRACTION W/  INTRAOCULAR LENS IMPLANT Left 06/05/2018    Dr. Castillo    CHOLECYSTECTOMY      laproscopic cholecystectomy    COLONOSCOPY N/A 8/13/2019    Procedure: COLONOSCOPY;  Surgeon: Jeffrey Maynard MD;  Location: [facility] ([address] [place] [address]);  Service: Endoscopy;  Laterality: N/A;    ESOPHAGOGASTRODUODENOSCOPY N/A 8/13/2019    Procedure: ESOPHAGOGASTRODUODENOSCOPY (EGD);  Surgeon: Jeffrey Maynard MD;  Location: [address]);  Service: Endoscopy;  Laterality: N/A;    HYSTERECTOMY      DENISHA/BSO - fibroids    INJECTION OF ANESTHETIC AGENT AROUND MEDIAL BRANCH NERVES INNERVATING LUMBAR FACET JOINT Bilateral 5/27/2021    Procedure: Block-nerve-medial branch-lumbar--Bilateral L4-5, L5-S1;  Surgeon: Ashish Ren Jr., MD;  [place] [facility] [place] [facility]  Service: Pain Management;  Laterality: Bilateral;    INJECTION OF ANESTHETIC AGENT AROUND MEDIAL BRANCH NERVES INNERVATING LUMBAR FACET JOINT Bilateral 6/10/2021    Procedure: Block-nerve-medial branch-lumbar--bilateral-- L4-5, L5-S1;  Surgeon: Ashish Ren Jr., MD;  Location: [facility] PAIN [facility];  Service: Pain Management;  Laterality: Bilateral;  patient is diabetic    INTRAOCULAR PROSTHESES INSERTION Left 6/5/2018    Procedure: INSERTION-INTRAOCULAR LENS (IOL);  Surgeon: Ga Castillo MD;  Location: [facility]  OR 1ST FLR;  Service: Ophthalmology;  Laterality: Left;    Laparoscopic right hemicolectomy      OOPHORECTOMY      PHACOEMULSIFICATION OF CATARACT Left 6/5/2018    Procedure: PHACOEMULSIFICATION-ASPIRATION-CATARACT;  Surgeon: Ga Castillo MD;  Location: [facility] OR [address] [place] [address];  Service: Ophthalmology;  Laterality: Left;         MEDS:  Medcard reviewed and updated    ALLERGIES: Allergy Card reviewed and updated    SOCIAL HISTORY:   The patient is a nonsmoker, denies alcohol or illicit drug use.    ROS:  GENERAL: No fever or weight loss.  Positive chills due to air conditioning.  Positive insomnia and fatigue, difficulty falling asleep.  SKIN: No rashes.  HEAD: Occasional headaches.  Denies recent head trauma.  EYES: No photophobia, ocular pain or diplopia.  Occasional flashing sensation of right eye intermittently.  EARS: Denies ear pain, discharge or vertigo.  NOSE: No epistaxis or postnasal drip.  MOUTH & THROAT: No hoarseness or change in voice. Positive tooth loss.  NODES: Denies swollen glands.  CHEST: Positive shortness of breath with exertion, denies wheezing, cough and sputum production.  CARDIOVASCULAR: Denies chest pain or palpitations.  ABDOMEN: Appetite fine. Positive diarrhea, denies abdominal pain, constipation or blood in stool.  URINARY: No dysuria or hematuria.  MUSCULOSKELETAL: No joint stiffness or swelling. Positive back pain.  NEUROLOGIC: No history of seizures.  ENDOCRINE: Occasional polyuria, denies polydipsia.  PSYCHIATRIC: Denies mood swings, homicidal or suicidal thoughts.  Positive depression, lack of motivation and difficulty finding dalia and anxiety    SCREENINGS:  Last cholesterol:2022 .  Last colonoscopy: 2019  Last mammogram: 2019  Last Pap smear: unknown  Last tetanus: unknown  Last Pneumovax: 2010/ 2018  Last eye exam: unknown  Last bone density: 2019  Last menstrual period: postmenopausal    PE:   Vitals:  Vitals:    12/22/23 1512   BP: 132/62   Pulse: (!) 59   Temp: 97 °F (36.1  °C)       APPEARANCE: Well nourished, well developed, in no acute distress.    EYES: Sclerae anicteric. PERRL. EOMI.      EARS: TM's intact. No retraction or perforation.    NOSE: Mucosa pink. Airway clear.  MOUTH & THROAT: No tonsillar enlargement. No pharyngeal erythema or exudate. No stridor.  NECK: Supple, no thyromegaly.  CHEST: Lungs clear to auscultation with unlabored respirations.  CARDIOVASCULAR: Normal S1, S2. No murmurs. No carotid bruits. Bilateral pedal edema.  ABDOMEN: Bowel sounds normal. Not distended. Soft. No tenderness or masses.  MUSCULOSKELETAL:  In wheelchair.   SKIN: Normal skin turgor, warm and dry.  NEUROLOGIC: Cranial Nerves: Intact.  PSYCHIATRIC: The patient is oriented to person, place, and time and has a pleasant affect.        ASSESSMENT/PLAN:  Val was seen today for annual exam, back pain, diarrhea, medication problem, depression, insomnia, fatigue and fall.    Diagnoses and all orders for this visit:    Essential hypertension  -     CBC Auto Differential; Future  -     Comprehensive Metabolic Panel; Future  -     Lipid Panel; Future  -     TSH; Future  -     Urinalysis; Future  -     blood pressure is controlled, continue current therapy    Type 2 diabetes mellitus with stage 4 chronic kidney disease, without long-term current use of insulin  -     Ambulatory referral/consult to Optometry; Future  -     Hemoglobin A1C; Future    Visit for screening mammogram  -     Mammo Digital Screening Bilat w/ Brijesh; Future    Menopause  -     DXA Bone Density Axial Skeleton 1 or more sites; Future            Answers submitted by the patient for this visit:  Review of Systems Questionnaire (Submitted on 12/22/2023)  activity change: Yes  unexpected weight change: No  neck pain: No  hearing loss: No  rhinorrhea: No  trouble swallowing: No  eye discharge: No  visual disturbance: No  chest tightness: No  wheezing: No  chest pain: No  palpitations: No  blood in stool: No  constipation:  No  vomiting: No  diarrhea: Yes  polydipsia: No  polyuria: Yes  difficulty urinating: No  hematuria: No  menstrual problem: No  dysuria: No  joint swelling: No  arthralgias: Yes  headaches: No  weakness: Yes  confusion: No  dysphoric mood: Yes

## 2023-12-31 ENCOUNTER — EXTERNAL CHRONIC CARE MANAGEMENT (OUTPATIENT)
Dept: PRIMARY CARE CLINIC | Facility: CLINIC | Age: 78
End: 2023-12-31
Payer: MEDICARE

## 2023-12-31 PROCEDURE — 99490 CHRNC CARE MGMT STAFF 1ST 20: CPT | Mod: PBBFAC,PO | Performed by: INTERNAL MEDICINE

## 2023-12-31 PROCEDURE — 99490 CHRNC CARE MGMT STAFF 1ST 20: CPT | Mod: S$PBB,,, | Performed by: INTERNAL MEDICINE

## 2024-01-31 ENCOUNTER — EXTERNAL CHRONIC CARE MANAGEMENT (OUTPATIENT)
Dept: PRIMARY CARE CLINIC | Facility: CLINIC | Age: 79
End: 2024-01-31
Payer: MEDICARE

## 2024-01-31 PROCEDURE — 99489 CPLX CHRNC CARE EA ADDL 30: CPT | Mod: PBBFAC,PO | Performed by: INTERNAL MEDICINE

## 2024-01-31 PROCEDURE — 99487 CPLX CHRNC CARE 1ST 60 MIN: CPT | Mod: S$PBB,,, | Performed by: INTERNAL MEDICINE

## 2024-01-31 PROCEDURE — 99487 CPLX CHRNC CARE 1ST 60 MIN: CPT | Mod: PBBFAC,PO | Performed by: INTERNAL MEDICINE

## 2024-01-31 PROCEDURE — 99489 CPLX CHRNC CARE EA ADDL 30: CPT | Mod: S$PBB,,, | Performed by: INTERNAL MEDICINE

## 2024-02-03 DIAGNOSIS — G47.00 INSOMNIA, UNSPECIFIED TYPE: ICD-10-CM

## 2024-02-03 DIAGNOSIS — E78.5 HYPERLIPIDEMIA, UNSPECIFIED HYPERLIPIDEMIA TYPE: ICD-10-CM

## 2024-02-04 NOTE — TELEPHONE ENCOUNTER
Care Due:                  Date            Visit Type   Department     Provider  --------------------------------------------------------------------------------                                MYCHART                              ANNUAL                              CHECKUP/PHY  [facility] INTERNAL  Last Visit: 12-      S            MEDICINE       Anh Atkinson                              EP -                              PRIMARY      [facility] INTERNAL  Next Visit: 03-      CARE ([facility])   MEDICINE       Anh Atkinson                                                            Last  Test          Frequency    Reason                     Performed    Due Date  --------------------------------------------------------------------------------    HBA1C.......  6 months...  insulin, sitaGLIPtin.....  07- 12-    Lipid Panel.  12 months..  lovastatin...............  Not Found    Overdue    [employer] Embedded Care Due Messages. Reference number: [id number].   2/03/2024 11:54:26 PM CST

## 2024-02-04 NOTE — TELEPHONE ENCOUNTER
Refill Routing Note   Medication(s) are not appropriate for processing by [facility] for the following reason(s):        Required labs outdated    [facility] action(s):  Defer               Appointments  past 12m or future 3m with PCP    Date Provider   Last Visit   12/22/2023 Anh Atkinson MD   Next Visit   3/25/2024 Anh Atkinson MD   ED visits in past 90 days: 0        Note composed:2:25 PM 02/04/2024

## 2024-02-04 NOTE — TELEPHONE ENCOUNTER
No care due was identified.  [facility] Embedded Care Due Messages. Reference number: [id number].   2/03/2024 11:54:56 PM CST

## 2024-02-05 RX ORDER — DIAZEPAM 5 MG/1
5 TABLET ORAL NIGHTLY
Qty: 30 TABLET | Refills: 3 | Status: SHIPPED | OUTPATIENT
Start: 2024-02-05 | End: 2024-06-04 | Stop reason: SDUPTHER

## 2024-02-05 RX ORDER — LOVASTATIN 40 MG/1
40 TABLET ORAL NIGHTLY
Qty: 90 TABLET | Refills: 3 | Status: SHIPPED | OUTPATIENT
Start: 2024-02-05

## 2024-03-04 NOTE — TELEPHONE ENCOUNTER
No care due was identified.  Health [facility] Embedded Care Due Messages. Reference number: [id number].   3/03/2024 10:09:39 PM CST

## 2024-03-04 NOTE — TELEPHONE ENCOUNTER
Refill Routing Note   Medication(s) are not appropriate for processing by [facility] for the following reason(s):        Required labs outdated    [facility] action(s):  Defer             Appointments  past 12m or future 3m with PCP    Date Provider   Last Visit   12/22/2023 Anh Atkinson MD   Next Visit   3/25/2024 Anh Atkinson MD   ED visits in past 90 days: 0        Note composed:10:17 PM 03/03/2024

## 2024-03-31 ENCOUNTER — EXTERNAL CHRONIC CARE MANAGEMENT (OUTPATIENT)
Dept: PRIMARY CARE CLINIC | Facility: CLINIC | Age: 79
End: 2024-03-31
Payer: MEDICARE

## 2024-03-31 PROCEDURE — 99490 CHRNC CARE MGMT STAFF 1ST 20: CPT | Mod: S$PBB,,, | Performed by: INTERNAL MEDICINE

## 2024-03-31 PROCEDURE — 99490 CHRNC CARE MGMT STAFF 1ST 20: CPT | Mod: PBBFAC,PO | Performed by: INTERNAL MEDICINE

## 2024-04-01 ENCOUNTER — LAB VISIT (OUTPATIENT)
Dept: LAB | Facility: HOSPITAL | Age: 79
End: 2024-04-01
Attending: INTERNAL MEDICINE
Payer: MEDICARE

## 2024-04-01 DIAGNOSIS — N18.4 TYPE 2 DIABETES MELLITUS WITH STAGE 4 CHRONIC KIDNEY DISEASE, WITHOUT LONG-TERM CURRENT USE OF INSULIN: ICD-10-CM

## 2024-04-01 DIAGNOSIS — I10 ESSENTIAL HYPERTENSION: ICD-10-CM

## 2024-04-01 DIAGNOSIS — E11.22 TYPE 2 DIABETES MELLITUS WITH STAGE 4 CHRONIC KIDNEY DISEASE, WITHOUT LONG-TERM CURRENT USE OF INSULIN: ICD-10-CM

## 2024-04-01 LAB
ALBUMIN SERPL BCP-MCNC: 3.7 G/DL (ref 3.5–5.2)
ALP SERPL-CCNC: 66 U/L (ref 55–135)
ALT SERPL W/O P-5'-P-CCNC: 22 U/L (ref 10–44)
ANION GAP SERPL CALC-SCNC: 12 MMOL/L (ref 8–16)
AST SERPL-CCNC: 32 U/L (ref 10–40)
BASOPHILS # BLD AUTO: 0.02 K/UL (ref 0–0.2)
BASOPHILS NFR BLD: 0.3 % (ref 0–1.9)
BILIRUB SERPL-MCNC: 0.6 MG/DL (ref 0.1–1)
BUN SERPL-MCNC: 16 MG/DL (ref 8–23)
CALCIUM SERPL-MCNC: 9.6 MG/DL (ref 8.7–10.5)
CHLORIDE SERPL-SCNC: 103 MMOL/L (ref 95–110)
CHOLEST SERPL-MCNC: 136 MG/DL (ref 120–199)
CHOLEST/HDLC SERPL: 3.3 {RATIO} (ref 2–5)
CO2 SERPL-SCNC: 25 MMOL/L (ref 23–29)
CREAT SERPL-MCNC: 1.5 MG/DL (ref 0.5–1.4)
DIFFERENTIAL METHOD BLD: ABNORMAL
EOSINOPHIL # BLD AUTO: 0.2 K/UL (ref 0–0.5)
EOSINOPHIL NFR BLD: 1.9 % (ref 0–8)
ERYTHROCYTE [DISTWIDTH] IN BLOOD BY AUTOMATED COUNT: 14.6 % (ref 11.5–14.5)
EST. GFR  (NO RACE VARIABLE): 35.2 ML/MIN/1.73 M^2
ESTIMATED AVG GLUCOSE: 160 MG/DL (ref 68–131)
GLUCOSE SERPL-MCNC: 195 MG/DL (ref 70–110)
HBA1C MFR BLD: 7.2 % (ref 4–5.6)
HCT VFR BLD AUTO: 39.9 % (ref 37–48.5)
HDLC SERPL-MCNC: 41 MG/DL (ref 40–75)
HDLC SERPL: 30.1 % (ref 20–50)
HGB BLD-MCNC: 13.5 G/DL (ref 12–16)
IMM GRANULOCYTES # BLD AUTO: 0.03 K/UL (ref 0–0.04)
IMM GRANULOCYTES NFR BLD AUTO: 0.4 % (ref 0–0.5)
LDLC SERPL CALC-MCNC: 71.4 MG/DL (ref 63–159)
LYMPHOCYTES # BLD AUTO: 1.8 K/UL (ref 1–4.8)
LYMPHOCYTES NFR BLD: 23.6 % (ref 18–48)
MCH RBC QN AUTO: 29.7 PG (ref 27–31)
MCHC RBC AUTO-ENTMCNC: 33.8 G/DL (ref 32–36)
MCV RBC AUTO: 88 FL (ref 82–98)
MONOCYTES # BLD AUTO: 0.5 K/UL (ref 0.3–1)
MONOCYTES NFR BLD: 5.8 % (ref 4–15)
NEUTROPHILS # BLD AUTO: 5.3 K/UL (ref 1.8–7.7)
NEUTROPHILS NFR BLD: 68 % (ref 38–73)
NONHDLC SERPL-MCNC: 95 MG/DL
NRBC BLD-RTO: 0 /100 WBC
PLATELET # BLD AUTO: 225 K/UL (ref 150–450)
PMV BLD AUTO: 12.7 FL (ref 9.2–12.9)
POTASSIUM SERPL-SCNC: 3.7 MMOL/L (ref 3.5–5.1)
PROT SERPL-MCNC: 7.5 G/DL (ref 6–8.4)
RBC # BLD AUTO: 4.54 M/UL (ref 4–5.4)
SODIUM SERPL-SCNC: 140 MMOL/L (ref 136–145)
TRIGL SERPL-MCNC: 118 MG/DL (ref 30–150)
TSH SERPL DL<=0.005 MIU/L-ACNC: 2.05 UIU/ML (ref 0.4–4)
WBC # BLD AUTO: 7.75 K/UL (ref 3.9–12.7)

## 2024-04-01 PROCEDURE — 83036 HEMOGLOBIN GLYCOSYLATED A1C: CPT | Performed by: INTERNAL MEDICINE

## 2024-04-01 PROCEDURE — 85025 COMPLETE CBC W/AUTO DIFF WBC: CPT | Performed by: INTERNAL MEDICINE

## 2024-04-01 PROCEDURE — 84443 ASSAY THYROID STIM HORMONE: CPT | Performed by: INTERNAL MEDICINE

## 2024-04-01 PROCEDURE — 80053 COMPREHEN METABOLIC PANEL: CPT | Performed by: INTERNAL MEDICINE

## 2024-04-01 PROCEDURE — 36415 COLL VENOUS BLD VENIPUNCTURE: CPT | Mod: PN | Performed by: INTERNAL MEDICINE

## 2024-04-01 PROCEDURE — 80061 LIPID PANEL: CPT | Performed by: INTERNAL MEDICINE

## 2024-04-04 ENCOUNTER — LAB VISIT (OUTPATIENT)
Dept: LAB | Facility: HOSPITAL | Age: 79
End: 2024-04-04
Attending: INTERNAL MEDICINE
Payer: MEDICARE

## 2024-04-04 DIAGNOSIS — I10 ESSENTIAL HYPERTENSION: ICD-10-CM

## 2024-04-04 LAB
BACTERIA #/AREA URNS AUTO: ABNORMAL /HPF
BILIRUB UR QL STRIP: NEGATIVE
CLARITY UR REFRACT.AUTO: CLEAR
COLOR UR AUTO: YELLOW
GLUCOSE UR QL STRIP: NEGATIVE
HGB UR QL STRIP: NEGATIVE
HYALINE CASTS UR QL AUTO: 5 /LPF
KETONES UR QL STRIP: NEGATIVE
LEUKOCYTE ESTERASE UR QL STRIP: ABNORMAL
MICROSCOPIC COMMENT: ABNORMAL
NITRITE UR QL STRIP: NEGATIVE
PH UR STRIP: 6 [PH] (ref 5–8)
PROT UR QL STRIP: ABNORMAL
RBC #/AREA URNS AUTO: 1 /HPF (ref 0–4)
SP GR UR STRIP: 1.01 (ref 1–1.03)
SQUAMOUS #/AREA URNS AUTO: 10 /HPF
URN SPEC COLLECT METH UR: ABNORMAL
WBC #/AREA URNS AUTO: 12 /HPF (ref 0–5)

## 2024-04-04 PROCEDURE — 81003 URINALYSIS AUTO W/O SCOPE: CPT | Mod: 59 | Performed by: INTERNAL MEDICINE

## 2024-04-04 PROCEDURE — 81001 URINALYSIS AUTO W/SCOPE: CPT | Performed by: INTERNAL MEDICINE

## 2024-04-11 RX ORDER — INSULIN GLARGINE 100 [IU]/ML
10 INJECTION, SOLUTION SUBCUTANEOUS NIGHTLY
Qty: 15 ML | Refills: 3 | Status: SHIPPED | OUTPATIENT
Start: 2024-04-11 | End: 2025-04-11

## 2024-04-14 DIAGNOSIS — F32.A DEPRESSION, UNSPECIFIED DEPRESSION TYPE: ICD-10-CM

## 2024-04-14 DIAGNOSIS — M41.9 SCOLIOSIS OF LUMBAR SPINE, UNSPECIFIED SCOLIOSIS TYPE: ICD-10-CM

## 2024-04-15 RX ORDER — FLUOXETINE HYDROCHLORIDE 40 MG/1
40 CAPSULE ORAL DAILY
Qty: 90 CAPSULE | Refills: 2 | Status: SHIPPED | OUTPATIENT
Start: 2024-04-15

## 2024-04-15 RX ORDER — BACLOFEN 10 MG/1
10 TABLET ORAL 2 TIMES DAILY
Qty: 60 TABLET | Refills: 3 | Status: SHIPPED | OUTPATIENT
Start: 2024-04-15

## 2024-04-15 NOTE — TELEPHONE ENCOUNTER
No care due was identified.  [facility] Embedded Care Due Messages. Reference number: [id number].   4/14/2024 10:30:21 PM CDT

## 2024-04-15 NOTE — TELEPHONE ENCOUNTER
Refill Decision Note   Val Bragg  is requesting a refill authorization.  Brief Assessment and Rationale for Refill:  Approve     Medication Therapy Plan:         Comments:     Note composed:4:41 PM 04/15/2024

## 2024-04-15 NOTE — TELEPHONE ENCOUNTER
Refill Routing Note   Medication(s) are not appropriate for processing by [facility] for the following reason(s):        Outside of protocol    [facility] action(s):  Route             Appointments  past 12m or future 3m with PCP    Date Provider   Last Visit   12/22/2023 Anh Atkinson MD   Next Visit   4/14/2024 Anh Atkinson MD   ED visits in past 90 days: 0        Note composed:9:32 AM 04/15/2024

## 2024-04-19 ENCOUNTER — PATIENT MESSAGE (OUTPATIENT)
Dept: ADMINISTRATIVE | Facility: HOSPITAL | Age: 79
End: 2024-04-19
Payer: MEDICARE

## 2024-04-23 ENCOUNTER — TELEPHONE (OUTPATIENT)
Dept: INTERNAL MEDICINE | Facility: CLINIC | Age: 79
End: 2024-04-23
Payer: MEDICARE

## 2024-04-25 ENCOUNTER — OFFICE VISIT (OUTPATIENT)
Facility: CLINIC | Age: 79
End: 2024-04-25
Payer: MEDICARE

## 2024-04-25 VITALS
WEIGHT: 205.5 LBS | BODY MASS INDEX: 35.27 KG/M2 | TEMPERATURE: 98 F | SYSTOLIC BLOOD PRESSURE: 155 MMHG | HEART RATE: 57 BPM | DIASTOLIC BLOOD PRESSURE: 72 MMHG | OXYGEN SATURATION: 92 %

## 2024-04-25 DIAGNOSIS — R04.0 ACUTE ANTERIOR EPISTAXIS: Primary | ICD-10-CM

## 2024-04-25 PROCEDURE — 99213 OFFICE O/P EST LOW 20 MIN: CPT | Mod: S$PBB,,, | Performed by: HOSPITALIST

## 2024-04-25 PROCEDURE — 99214 OFFICE O/P EST MOD 30 MIN: CPT | Mod: PBBFAC,PN | Performed by: HOSPITALIST

## 2024-04-25 PROCEDURE — 99999 PR PBB SHADOW E&M-EST. PATIENT-LVL IV: CPT | Mod: PBBFAC,,, | Performed by: HOSPITALIST

## 2024-04-25 NOTE — PROGRESS NOTES
Primary Care Provider Appointment - [facility] & Torri Cooper MD  Same Day/Urgent Care Visit    Subjective:      Patient ID: Val Bragg is a 78 y.o. female with   Past Medical History:   Diagnosis Date    Alternating exotropia 2013    Colon polyps 2019    Diabetes mellitus, type 2     Disorder of kidney and ureter     GERD (gastroesophageal reflux disease)     Hyperlipidemia     Hypertension     Lumbar spondylosis 2021    Menopausal state     Multiple gastric polyps     on EGD in     Pneumonia of right middle lobe due to infectious organism 2019    Right middle lobe pneumonia 2019    Sickle cell trait            Chief Complaint: Epistaxis (Pt have 2 weeks with problem ) and Facial Swelling    Prior to this visit, patient's last encounter with PCP was 2023.    Pt seen today w/ son who brought her to appt. Pt reports a sore in her nose that keeps bleeding when disturbed.  Recently broke a tooth and feels the pain below affected nare.  Ongoing x 2 wks; rebleeds whenever poked at.  On xarelto for PAF so bleeding takes a while to stop.  Has been on for about a year.  Holds tissue to nose to stop bleeding.  Touches/picks at it daily triggering rebleeding.  Coughed up some bloody phlegm recently.    Has Rx for steroid nasal spray but hasn't used in a long time.  Has chronic PND/allergic rhinitis which all her family has.  Hasn't been taking anything for it lately though.      4Ms for Medical Decision-Making in Older Adults    Last Completed EAWV: 2022    MOBILITY:  Get Up and Go:       No data to display              Activities of Daily Livin/29/2022     2:03 PM   Activities of Daily Living   Ambulation Assistance Required   Ambulation Assistance Walker (wheeled)   Dressing Independent   Transfers Assistance Required   Transfers Assistance Minimum   Toileting Incontinent of bladder;Incontinent of bowel   Toileting Assistance Wears Briefs   Feeding  Independent   Cleaning home/Chores Assistance Required   Telephone use Independent   Shopping Assistance Required   Paying bills Assistance Required   Taking meds Independent   If required, who assists the patient with ADLs? son Jasbir Duggan Test:       No data to display              Disability Status:      2022     2:04 PM   Disability Status   Are you deaf or do you have serious difficulty hearing? N   Are you blind or do you have serious difficulty seeing, even when wearing glasses? N   Because of a physical, mental, or emotional condition, do you have serious difficulty concentrating, remembering, or making decisions? Y   Do you have serious difficulty walking or climbing stairs? Y   Do you have difficulty dressing or bathing? Y   Because of a physical, mental, or emotional condition, do you have difficulty doing errands alone such as visiting a doctor's office or shopping? Y     Nutrition Screenin/29/2022     2:01 PM   Nutrition Screening   Has food intake declined over the past three months due to loss of appetite, digestive problems, chewing or swallowing difficulties? No decrease in food intake   Involuntary weight loss during the last 3 months? No weight loss   Mobility? Goes out   Has the patient suffered psychological stress or acute disease in the past three months? Yes   Neuropsychological problems? No psychological problems   Body Mass Index (BMI)?  BMI 23 or greater   Screening Score 12   Interpretation Normal nutritional status    Screening Score: 0-7 Malnourished, 8-11 At Risk, 12-14 Normal    MENTATION:   Depression Patient Health Questionnaire:      2024    11:20 AM   Depression Patient Health Questionnaire   Over the last two weeks how often have you been bothered by little interest or pleasure in doing things Nearly every day   Over the last two weeks how often have you been bothered by feeling down, depressed or hopeless More than half the days   PHQ-2 Total Score 5      Has Dementia Dx: No    Cognitive Function Screenin/29/2022     2:00 PM   Cognitive Function Screening   Mini-Cog 3 Minute Recall 3   Cognitive Function Screening 3     Cognitive Function Screening Total - Less than 4 = Abnormal,  Greater than or equal to 4 = Normal    MEDICATIONS:  High Risk Medications:  Total Active Medications: 4  baclofen - 10 MG  diazePAM - 5 MG  FLUoxetine - 40 MG  gabapentin - 300 MG    WHAT MATTERS MOST:  Advance Care Planning   ACP Status:   Patient has had an ACP conversation  Living Will: No  Power of : No  [place]: No      Social History     Socioeconomic History    Marital status:    Tobacco Use    Smoking status: Never    Smokeless tobacco: Never   Substance and Sexual Activity    Alcohol use: No    Drug use: No    Sexual activity: Not Currently     Partners: Male     Birth control/protection: None     Social Determinants of Health     Financial Resource Strain: Medium Risk (2024)    Overall Financial Resource Strain (CARDIA)     Difficulty of Paying Living Expenses: Somewhat hard   Food Insecurity: Food Insecurity Present (2024)    Hunger Vital Sign     Worried About Running Out of Food in the Last Year: Sometimes true     Ran Out of Food in the Last Year: Never true   Transportation Needs: Unmet Transportation Needs (2024)    PRAPARE - Transportation     Lack of Transportation (Medical): Yes     Lack of Transportation (Non-Medical): No   Physical Activity: Inactive (2024)    Exercise Vital Sign     Days of Exercise per Week: 0 days     Minutes of Exercise per Session: 0 min   Stress: Stress Concern Present (2024)    [employer] - Occupational Stress Questionnaire     Feeling of Stress : To some extent   Social Connections: Unknown (2024)    Social Connection and Isolation Panel [NHANES]     Frequency of Communication with Friends and Family: More than three times a week     Frequency of Social  Gatherings with Friends and Family: Once a week     Active Member of Clubs or Organizations: No     Attends Club or Organization Meetings: Never     Marital Status:    Housing Stability: Low Risk  (8/29/2022)    Housing Stability Vital Sign     Unable to Pay for Housing in the Last Year: No     Number of Places Lived in the Last Year: 1     Unstable Housing in the Last Year: No       Review of Systems   Constitutional:  Negative for activity change, chills and fever.   HENT:  Positive for dental problem, nosebleeds, postnasal drip and sneezing. Negative for trouble swallowing.    Respiratory:  Negative for cough and shortness of breath.    Neurological:  Negative for dizziness and light-headedness.   Psychiatric/Behavioral:  Positive for dysphoric mood. The patient is nervous/anxious.         Objective:   BP (!) 155/72   Pulse (!) 57   Temp 97.8 °F (36.6 °C) (Oral)   Wt 93.2 kg (205 lb 7.5 oz)   SpO2 (!) 92%   BMI 35.27 kg/m²     Physical Exam  Vitals reviewed.   Constitutional:       General: She is not in acute distress.     Appearance: She is obese.      Comments: Pt seen in WC   HENT:      Head: Normocephalic and atraumatic.      Nose:      Right Turbinates: Enlarged and swollen.      Left Turbinates: Enlarged and swollen.      Comments: Septal scab R nare just inside entrance.  No active bleeding.       Mouth/Throat:      Mouth: Mucous membranes are moist.      Dentition: Abnormal dentition.      Comments: Broken maxillary incisor  Eyes:      General: No scleral icterus.     Conjunctiva/sclera: Conjunctivae normal.   Pulmonary:      Effort: Pulmonary effort is normal. No respiratory distress.   Neurological:      Mental Status: She is alert and oriented to person, place, and time.              Lab Results   Component Value Date    WBC 7.75 04/01/2024    HGB 13.5 04/01/2024    HCT 39.9 04/01/2024     04/01/2024    CHOL 136 04/01/2024    TRIG 118 04/01/2024    HDL 41 04/01/2024    ALT 22  04/01/2024    AST 32 04/01/2024     04/01/2024    K 3.7 04/01/2024     04/01/2024    CREATININE 1.5 (H) 04/01/2024    BUN 16 04/01/2024    CO2 25 04/01/2024    TSH 2.054 04/01/2024    INR 0.9 04/10/2008    GLUF 106 11/18/2004    HGBA1C 7.2 (H) 04/01/2024       Current Outpatient Medications on File Prior to Visit   Medication Sig Dispense Refill    baclofen (LIORESAL) 10 MG tablet Take 1 tablet (10 mg total) by mouth 2 (two) times daily. 60 tablet 3    blood sugar diagnostic Strp To check BG 2 times daily, to use with insurance preferred meter 100 strip 0    blood-glucose meter kit Use meter to check blood sugar 2 (two) times a day (Patient taking differently: Use meter to check blood sugar 2 (two) times a day) 1 each 0    diazePAM (VALIUM) 5 MG tablet Take 1 tablet (5 mg total) by mouth every evening. 30 tablet 3    flash glucose sensor (FREESTYLE TERESA 2 SENSOR) Kit Change every 14 days. 2 kit 11    FLUoxetine 40 MG capsule Take 1 capsule (40 mg total) by mouth once daily. 90 capsule 2    furosemide (LASIX) 40 MG tablet Take 1 tablet (40 mg total) by mouth daily as needed. 10 tablet 0    gabapentin (NEURONTIN) 300 MG capsule Take one capsule by mouth every morning and two capsules every evening 270 capsule 3    hydroCHLOROthiazide (HYDRODIURIL) 25 MG tablet Take 1 tablet (25 mg total) by mouth once daily. 90 tablet 3    hydrocortisone 2.5 % cream Apply topically 2 (two) times daily. 30 g 1    insulin (LANTUS SOLOSTAR U-100 INSULIN) glargine 100 units/mL SubQ pen Inject 10 Units into the skin every evening. 15 mL 3    lancets Misc To check BG 2 times daily, to use with insurance preferred meter 100 each 3    lidocaine (LIDODERM) 5 % Place 1 patch onto the skin once daily. Apply to affected area daily for 12 hours, then remove for 12 hours. 30 patch 6    losartan (COZAAR) 50 MG tablet Take 1 tablet (50 mg total) by mouth once daily. 90 tablet 3    lovastatin (MEVACOR) 40 MG tablet Take 1 tablet (40 mg  "total) by mouth nightly. 90 tablet 3    metoprolol succinate (TOPROL-XL) 25 MG 24 hr tablet Take 1 tablet (25 mg total) by mouth once daily. 90 tablet 3    mometasone (NASONEX) 50 mcg/actuation nasal spray Instill 2 sprays in each nostril every evening. 17 g 3    multivitamin capsule Take 1 capsule by mouth once daily.      NIFEdipine (PROCARDIA-XL) 90 MG (OSM) 24 hr tablet Take 1 tablet (90 mg total) by mouth once daily. TAKE 1 TABLET(90 MG) BY MOUTH EVERY DAY 90 tablet 3    pantoprazole (PROTONIX) 40 MG tablet Take 1 tablet (40 mg total) by mouth once daily. 90 tablet 3    pen needle, diabetic 32 gauge x 5/32" Ndle 1 Units by Misc.(Non-Drug; Combo Route) route once daily. 90 each 3    rivaroxaban (XARELTO) 15 mg Tab Take 1 tablet (15 mg total) by mouth daily with dinner or evening meal. 30 tablet 11    SITagliptin phosphate (JANUVIA) 25 MG Tab Take 1 tablet (25 mg total) by mouth once daily. 90 tablet 3    traZODone (DESYREL) 150 MG tablet TAKE 1 TABLET(150 MG) BY MOUTH EVERY NIGHT 90 tablet 3     Current Facility-Administered Medications on File Prior to Visit   Medication Dose Route Frequency Provider Last Rate Last Admin    0.9%  NaCl infusion   Intravenous Continuous Ashish Ren Jr., MD 25 mL/hr at 05/04/21 1014 New Bag at 05/04/21 1014    0.9%  NaCl infusion   Intravenous Continuous Ashish Ren Jr., MD 25 mL/hr at 05/27/21 1116 New Bag at 05/27/21 1116    LIDOcaine (PF) 10 mg/ml (1%) injection 10 mg  1 mL Intradermal Once Ashish Ren Jr., MD        LIDOcaine (PF) 10 mg/ml (1%) injection 10 mg  1 mL Intradermal Once Ashish Rne Jr., MD             Assessment:   78 y.o. female with multiple co-morbid illnesses here for acute complaint.    Plan:     Problem List Items Addressed This Visit       Acute anterior epistaxis - Primary     May have been initially caused in context of sneezing or dry nose, with predisposition for delayed hemostasis on account of rivaroxaban use, but the " frequent recurrence of bleeding is likely secondary to physical trauma of patient picking at scab daily.  She was advised to not manipulate the scab or blow her nose for at least a week to allow healing to occur.  She was provided with some Neosporin samples to apply to the area in the event an infectious ulcer was the initial cause, which may also facilitate healing provided she is able to apply it without re-traumatizing the scab.  She was additionally counseled that her steroid nasal spray can also contribute to nose bleeding by thinning the mucosa, so if epistaxis recurs while she is using the spray she would need to stop using it for several days to allow it to heal.            Health Maintenance         Date Due Completion Date    Hepatitis C Screening Never done ---    TETANUS VACCINE Never done ---    RSV Vaccine (Age 60+ and Pregnant patients) (1 - 1-dose 60+ series) Never done ---    Shingles Vaccine (3 of 3) 06/10/2019 4/15/2019    Eye Exam 04/05/2020 4/5/2019    DEXA Scan 01/15/2023 1/15/2019    Diabetes Urine Screening 01/19/2023 1/19/2022    Influenza Vaccine (1) 09/01/2023 9/30/2022    COVID-19 Vaccine (7 - 2023-24 season) 09/01/2023 9/30/2022    Hemoglobin A1c 10/01/2024 4/1/2024    Lipid Panel 04/01/2025 4/1/2024            Future Appointments   Date Time Provider Department Center   5/10/2024  1:00 PM [facility], DEXA1 [facility] [place]   6/3/2024  1:00 PM [facility] MAMMO1 [facility] [place]   7/5/2024  2:00 PM Anh Atkinson MD [facility] [place]         Follow up if symptoms worsen or fail to improve. Total clinical care time was 25 min.    iLno Cooper MD   Plus, [employer]    This note was partially dictated using voice recognition software and although actively proofread during transcription, it is possible some errors in transcription may have been overlooked.

## 2024-04-25 NOTE — Clinical Note
I saw Ms. Neal today for recurrent nosebleeds.  Her PHQ-2 was positive so I wanted to pass that along to you.

## 2024-04-25 NOTE — ASSESSMENT & PLAN NOTE
May have been initially caused in context of sneezing or dry nose, with predisposition for delayed hemostasis on account of rivaroxaban use, but the frequent recurrence of bleeding is likely secondary to physical trauma of patient picking at scab daily.  She was advised to not manipulate the scab or blow her nose for at least a week to allow healing to occur.  She was provided with some Neosporin samples to apply to the area in the event an infectious ulcer was the initial cause, which may also facilitate healing provided she is able to apply it without re-traumatizing the scab.  She was additionally counseled that her steroid nasal spray can also contribute to nose bleeding by thinning the mucosa, so if epistaxis recurs while she is using the spray she would need to stop using it for several days to allow it to heal.

## 2024-05-08 RX ORDER — NIFEDIPINE 90 MG/1
90 TABLET, EXTENDED RELEASE ORAL DAILY
Qty: 90 TABLET | Refills: 2 | Status: SHIPPED | OUTPATIENT
Start: 2024-05-08

## 2024-05-08 NOTE — TELEPHONE ENCOUNTER
Refill Routing Note   Medication(s) are not appropriate for processing by [facility] for the following reason(s):        Required vitals abnormal    [facility] action(s):  Defer               Appointments  past 12m or future 3m with PCP    Date Provider   Last Visit   12/22/2023 Anh Atkinson MD   Next Visit   7/5/2024 Anh Atkinson MD   ED visits in past 90 days: 0        Note composed:8:17 AM 05/08/2024

## 2024-05-08 NOTE — TELEPHONE ENCOUNTER
No care due was identified.  [facility] Embedded Care Due Messages. Reference number: [id number].   5/07/2024 11:29:54 PM CDT

## 2024-06-04 DIAGNOSIS — G47.00 INSOMNIA, UNSPECIFIED TYPE: ICD-10-CM

## 2024-06-05 RX ORDER — DIAZEPAM 5 MG/1
5 TABLET ORAL NIGHTLY
Qty: 30 TABLET | Refills: 3 | Status: SHIPPED | OUTPATIENT
Start: 2024-06-19

## 2024-06-05 NOTE — PROGRESS NOTES
Subjective:     Val Bragg is a 78 y.o. female who was  self-referred  for R nosebleeds.    Recently broke a tooth and feels the pain below affected nare.  Ongoing x 2 wks; rebleeds whenever poked at.  On xarelto for PAF so bleeding takes a while to stop.  Has been on for about a year.  Holds tissue to nose to stop bleeding.  Touches/picks at it daily triggering rebleeding.  Coughed up some bloody phlegm recently.    Has Rx for steroid nasal spray but hasn't used in a long time.  Has chronic PND/allergic rhinitis which all her family has.  Hasn't been taking anything for it lately though.       She has not previously had nasal cauterization.  The bleeding has not required packing for control.  The last episode was 1 days ago, and is not currently active.  There is not a prior history of nasal surgery.  There is not a prior history of nasal trauma.  There is a history of environmental allergies.  She does not currently use nasal cannula.  She does not currently use a nasal spray.  There is not a family history to suggest a clotting disorder.  She does currently take a blood-thinning agent Xarelto    Past Medical History  She has a past medical history of Alternating exotropia, Colon polyps, Diabetes mellitus, type 2, Disorder of kidney and ureter, GERD (gastroesophageal reflux disease), Hyperlipidemia, Hypertension, Lumbar spondylosis, Menopausal state, Multiple gastric polyps, Pneumonia of right middle lobe due to infectious organism, Right middle lobe pneumonia, and Sickle cell trait.    Past Surgical History  She has a past surgical history that includes Laparoscopic right hemicolectomy; Hysterectomy; Oophorectomy; Cholecystectomy; Phacoemulsification of cataract (Left, 6/5/2018); Intraocular prosthesis insertion (Left, 6/5/2018); Cataract extraction w/  intraocular lens implant (Left, 06/05/2018); Esophagogastroduodenoscopy (N/A, 8/13/2019); Colonoscopy (N/A, 8/13/2019); Injection of anesthetic agent  around medial branch nerves innervating lumbar facet joint (Bilateral, 5/27/2021); and Injection of anesthetic agent around medial branch nerves innervating lumbar facet joint (Bilateral, 6/10/2021).    Family History  Her family history includes Cancer in her brother; Cataracts in her brother and sister; Colon cancer (age of onset: 60) in her sister; Diabetes in her mother, paternal aunt, and sister; Heart disease in her father; Hypertension in her brother, father, and sister; Stroke in her brother, mother, sister, and sister.    Social History  She reports that she has never smoked. She has never used smokeless tobacco. She reports that she does not drink alcohol and does not use drugs.    Allergies  She is allergic to propoxyphene n-acetaminophen.    Medications  She has a current medication list which includes the following prescription(s): baclofen, blood sugar diagnostic, blood-glucose meter, [START ON 6/19/2024] diazepam, freestyle christopher 2 sensor, fluoxetine, furosemide, gabapentin, hydrochlorothiazide, hydrocortisone, lantus solostar u-100 insulin, lancets, lidocaine, losartan, lovastatin, metoprolol succinate, mometasone, multivitamin, nifedipine, pantoprazole, pen needle, diabetic, rivaroxaban, sitagliptin phosphate, and trazodone.    Review of Systems     Constitutional: Positive for fatigue.      HENT: Positive for nosebleeds.      Eyes:  Negative for change in eyesight, eye drainage, eye itching and photophobia.     Respiratory:  Positive for cough and snoring.      Cardiovascular:  Negative for chest pain, foot swelling, irregular heartbeat and swollen veins.     Gastrointestinal:  Positive for acid reflux and diarrhea.     Genitourinary: Positive for frequent urination.     Musc: Positive for back pain.     Skin: Negative for rash.     Allergy: Positive for seasonal allergies.     Endocrine: Positive for cold intolerance.     Neurological: Negative for dizziness, headaches, light-headedness, seizures  and tremors.      Hematologic: Negative for bruises/bleeds easily and swollen glands.      Psychiatric: Negative for decreased concentration, depression, nervous/anxious and sleep disturbance.               Objective:     BP (!) 170/83 (BP Location: Right arm, Patient Position: Sitting, BP Method: Large (Automatic))   Pulse 73   Wt 93 kg (205 lb)   BMI 35.19 kg/m²      Constitutional:   She appears well-developed and well-nourished. She is active. Normal speech.      Head:  Normocephalic and atraumatic.     Nose:  Mucosal edema present. No rhinorrhea or nasal septal hematoma. Epistaxis is observed. Turbinate hypertrophy.  Turbinates normal and no turbinate masses.    R septum with small, pedunculated, mobile lesion (<5mm) with eschar formed on posterior portion and multiple foci of recent bleeding on anterior portion of lesion    Mouth/Throat  Oropharynx clear and moist without lesions or asymmetry and oropharynx normal. Abnormal dentition. Dental caries present. No uvula swelling. No oropharyngeal exudate, posterior oropharyngeal edema or posterior oropharyngeal erythema. Tonsils present, +1.    Chipped L incisor and significant portion of crown missing from R incisor      Pulmonary/Chest:   Effort normal.     Psychiatric:   She has a normal mood and affect. Her speech is normal and behavior is normal.       Procedure    Control of Epistaxis    Indication:  Epistaxis    Informed consent was obtained prior to proceeding.  The right nasal cavity was anesthetized with topical 4% lidocaine.  Bleeding was localized to the right nasal cavity  on anterior portion of nasal lesion    and cauterized with silver nitrate.  Bleeding was not controlled after.  I used pledgets soaked with phenylephrine and bacitracin ointment which controlled the bleeding.    The patient tolerated the procedure well.     Data Reviewed    Hemoglobin (g/dL)   Date Value   04/01/2024 13.5     Hematocrit (%)   Date Value   04/01/2024 39.9      Platelets (K/uL)   Date Value   04/01/2024 225     Prothrombin Time (sec)   Date Value   04/10/2008 9.5     aPTT (sec)   Date Value   04/10/2008 25.3     INR (no units)   Date Value   04/10/2008 0.9       Assessment and Plan:     1. Right-sided epistaxis\  2. Lesion of nasal septum  - lesion of right nasal septum with recurrent epistaxis while patient on NOAC  - Encouraged cessation of nasal manipulation/picking  - suspicion for malignancy d/t malodor\  - Initially ffailed cauterization with silver nitrate, but bleeding eventually controlled   - Rx bactroban x7 days, then  - Use aquaphor or vaseline ointment at least once daily in nasal vestibule  - Nasal saline spray/Ayr gel to nose at least twice daily  - Consider room humidifier  - Had a lengthy conversation with the patient regarding the etiology of epistaxis and management strategies.    - Patient provided detailed instructions for the management and prevention of nosebleeds and written instructions were given in their AVS.    - She was also advised about when to seek emergency care.        She will No follow-ups on file.   I had a discussion with the patient and her son regarding her condition and the further workup and management options.    All questions were answered, and the patient is in agreement with the above.

## 2024-06-05 NOTE — TELEPHONE ENCOUNTER
Refill Routing Note   Medication(s) are not appropriate for processing by [facility] for the following reason(s):        Outside of protocol    [facility] action(s):  Route               Appointments  past 12m or future 3m with PCP    Date Provider   Last Visit   12/22/2023 Anh Atkinson MD   Next Visit   6/24/2024 Anh Atkinson MD   ED visits in past 90 days: 0        Note composed:8:56 AM 06/05/2024

## 2024-06-05 NOTE — TELEPHONE ENCOUNTER
No care due was identified.  [facility] Embedded Care Due Messages. Reference number: [id number].   6/04/2024 9:03:25 PM CDT

## 2024-06-06 ENCOUNTER — OFFICE VISIT (OUTPATIENT)
Dept: OTOLARYNGOLOGY | Facility: CLINIC | Age: 79
End: 2024-06-06
Payer: MEDICARE

## 2024-06-06 ENCOUNTER — TELEPHONE (OUTPATIENT)
Dept: OTOLARYNGOLOGY | Facility: CLINIC | Age: 79
End: 2024-06-06

## 2024-06-06 VITALS
DIASTOLIC BLOOD PRESSURE: 83 MMHG | SYSTOLIC BLOOD PRESSURE: 170 MMHG | HEART RATE: 73 BPM | BODY MASS INDEX: 35.19 KG/M2 | WEIGHT: 205 LBS

## 2024-06-06 DIAGNOSIS — J34.89 LESION OF NASAL SEPTUM: ICD-10-CM

## 2024-06-06 DIAGNOSIS — R04.0 RIGHT-SIDED EPISTAXIS: Primary | ICD-10-CM

## 2024-06-06 PROCEDURE — 30901 CONTROL OF NOSEBLEED: CPT | Mod: PBBFAC | Performed by: PHYSICIAN ASSISTANT

## 2024-06-06 PROCEDURE — 99999 PR PBB SHADOW E&M-EST. PATIENT-LVL IV: CPT | Mod: PBBFAC,,, | Performed by: PHYSICIAN ASSISTANT

## 2024-06-06 PROCEDURE — 99214 OFFICE O/P EST MOD 30 MIN: CPT | Mod: PBBFAC | Performed by: PHYSICIAN ASSISTANT

## 2024-06-06 PROCEDURE — 30901 CONTROL OF NOSEBLEED: CPT | Mod: S$PBB,RT,, | Performed by: PHYSICIAN ASSISTANT

## 2024-06-06 PROCEDURE — 99214 OFFICE O/P EST MOD 30 MIN: CPT | Mod: 25,S$PBB,, | Performed by: PHYSICIAN ASSISTANT

## 2024-06-06 RX ORDER — MUPIROCIN 20 MG/G
OINTMENT TOPICAL 2 TIMES DAILY
Qty: 22 G | Refills: 0 | Status: SHIPPED | OUTPATIENT
Start: 2024-06-06 | End: 2024-06-14

## 2024-06-06 RX ORDER — AZELASTINE 1 MG/ML
1 SPRAY, METERED NASAL 2 TIMES DAILY
Qty: 30 ML | Refills: 11 | Status: CANCELLED | OUTPATIENT
Start: 2024-06-06 | End: 2025-06-06

## 2024-06-06 NOTE — TELEPHONE ENCOUNTER
----- Message from Aziza Demarco MD sent at 6/6/2024  4:17 PM CDT -----  I can see her 6/17  ----- Message -----  From: Kathleen Landa MA  Sent: 6/6/2024   1:40 PM CDT  To: MD Monroe Paz Dr! Polo wanted you to see this patient for lesion of nasal septum concern for cancer. Do you want me to schedule next available or do you want to double book? Thanks!    Kathleen

## 2024-06-07 ENCOUNTER — TELEPHONE (OUTPATIENT)
Dept: OTOLARYNGOLOGY | Facility: CLINIC | Age: 79
End: 2024-06-07
Payer: MEDICARE

## 2024-06-07 NOTE — TELEPHONE ENCOUNTER
----- Message from Rashida Chavez RN sent at 6/7/2024  4:22 PM CDT -----  Regarding: FW: Appt  Contact: [phone]    ----- Message -----  From: Zeinab Soria  Sent: 6/7/2024   4:03 PM CDT  To: Nilam Ervin Staff  Subject: Appt                                             Pt virginie Jackson is returning a missed call regarding scheduling the pt on 6/17 with Dr. Demarco for lesion of nasal septum concern for cancer. Please contact pt son

## 2024-06-17 ENCOUNTER — PATIENT OUTREACH (OUTPATIENT)
Dept: ADMINISTRATIVE | Facility: HOSPITAL | Age: 79
End: 2024-06-17
Payer: MEDICARE

## 2024-06-17 ENCOUNTER — OFFICE VISIT (OUTPATIENT)
Dept: OTOLARYNGOLOGY | Facility: CLINIC | Age: 79
End: 2024-06-17
Payer: MEDICARE

## 2024-06-17 VITALS
DIASTOLIC BLOOD PRESSURE: 81 MMHG | SYSTOLIC BLOOD PRESSURE: 148 MMHG | HEART RATE: 62 BPM | BODY MASS INDEX: 28.38 KG/M2 | WEIGHT: 165.38 LBS

## 2024-06-17 DIAGNOSIS — E11.9 DM TYPE 2 WITHOUT RETINOPATHY: Primary | ICD-10-CM

## 2024-06-17 DIAGNOSIS — R04.0 RIGHT-SIDED EPISTAXIS: ICD-10-CM

## 2024-06-17 DIAGNOSIS — J34.89 LESION OF NASAL SEPTUM: Primary | ICD-10-CM

## 2024-06-17 PROCEDURE — 31231 NASAL ENDOSCOPY DX: CPT | Mod: PBBFAC | Performed by: OTOLARYNGOLOGY

## 2024-06-17 PROCEDURE — 99214 OFFICE O/P EST MOD 30 MIN: CPT | Mod: 25,S$PBB,, | Performed by: OTOLARYNGOLOGY

## 2024-06-17 PROCEDURE — 31231 NASAL ENDOSCOPY DX: CPT | Mod: S$PBB,,, | Performed by: OTOLARYNGOLOGY

## 2024-06-17 PROCEDURE — 99999 PR PBB SHADOW E&M-EST. PATIENT-LVL IV: CPT | Mod: PBBFAC,,, | Performed by: OTOLARYNGOLOGY

## 2024-06-17 PROCEDURE — 99214 OFFICE O/P EST MOD 30 MIN: CPT | Mod: PBBFAC | Performed by: OTOLARYNGOLOGY

## 2024-06-17 NOTE — PROGRESS NOTES
Population Health Chart Review & Patient Outreach Details      Additional [facility] Notes:               Updates Requested / Reviewed:      Care Everywhere, , and Immunizations Reconciliation Completed or Queried: [facility] Topics Overdue:      [facility] ER Score: 4     Osteoporosis Screening  Urine Screening  Eye Exam  Uncontrolled BP    Tetanus Vaccine  Shingles/Zoster Vaccine  RSV Vaccine                  Health Maintenance Topic(s) Outreach Outcomes & Actions Taken:    Primary Care Appt - Outreach Outcomes & Actions Taken  : Primary Care Appt Scheduled    Lab(s) - Outreach Outcomes & Actions Taken  : Overdue Lab(s) Ordered

## 2024-06-17 NOTE — PROGRESS NOTES
History of Present Illness:   Val Bragg is a 78 y.o. year old female evaluated in the Otolaryngology-Head and Neck Surgery Clinic at [facility]. The patient was referred by Polo for evaluation of nasal lesion.  Patient reports nasal bleeding that has been present for more than a month now.  She is on Xarelto.  She reports mostly this is some blood on the pillow and blood-tinged mucus but occasionally is drip.  She has not had to have packing.  She saw Polo on 06/06/2024 and was treated with silver nitrate to the area.  She does admit to picking at nose.  Her son verifies that this is sometimes not even conscious and she wakes up with doing that.  She denies any prior nasal surgery.  She denies significant recurrent sinusitis but does have some allergic rhinitis and was previously on Nasonex.            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    Colon polyps 08/13/2019    Diabetes mellitus, type 2     Disorder of kidney and ureter     GERD (gastroesophageal reflux disease)     Hyperlipidemia     Hypertension     Lumbar spondylosis 04/16/2021    Menopausal state     Multiple gastric polyps     on EGD in 2004    Pneumonia of right middle lobe due to infectious organism 03/19/2019    Right middle lobe pneumonia 03/19/2019    Sickle cell trait      Her  has a past surgical history that includes Laparoscopic right hemicolectomy; Hysterectomy; Oophorectomy; Cholecystectomy; Phacoemulsification of cataract (Left, 06/05/2018); Intraocular prosthesis insertion (Left, 06/05/2018); Cataract extraction w/  intraocular lens implant (Left, 06/05/2018); Esophagogastroduodenoscopy (N/A, 08/13/2019); Colonoscopy (N/A, 08/13/2019); Injection of anesthetic agent around medial branch nerves innervating lumbar facet joint (Bilateral, 05/27/2021); and Injection of anesthetic agent around medial branch nerves innervating lumbar facet joint (Bilateral,  06/10/2021).     Past Family/Social History  Her family history includes Cancer in her brother; Cataracts in her brother and sister; Colon cancer (age of onset: 60) in her sister; Diabetes in her mother, paternal aunt, and sister; Heart disease in her father; Hypertension in her brother, father, and sister; Stroke in her brother, mother, sister, and sister.  She  reports that she has never smoked. She has never used smokeless tobacco. She reports that she does not drink alcohol and does not use drugs.     Medications/Allergies/Immunizations  Her current medication(s) include:   Current Outpatient Medications   Medication Sig Dispense Refill    baclofen (LIORESAL) 10 MG tablet Take 1 tablet (10 mg total) by mouth 2 (two) times daily. 60 tablet 3    blood sugar diagnostic Strp To check BG 2 times daily, to use with insurance preferred meter 100 strip 0    blood-glucose meter kit Use meter to check blood sugar 2 (two) times a day (Patient taking differently: Use meter to check blood sugar 2 (two) times a day) 1 each 0    [START ON 6/19/2024] diazePAM (VALIUM) 5 MG tablet Take 1 tablet (5 mg total) by mouth every evening. 30 tablet 3    flash glucose sensor (FREESTYLE TERESA 2 SENSOR) Kit Change every 14 days. 2 kit 11    FLUoxetine 40 MG capsule Take 1 capsule (40 mg total) by mouth once daily. 90 capsule 2    furosemide (LASIX) 40 MG tablet Take 1 tablet (40 mg total) by mouth daily as needed. 10 tablet 0    gabapentin (NEURONTIN) 300 MG capsule Take one capsule by mouth every morning and two capsules every evening 270 capsule 3    hydroCHLOROthiazide (HYDRODIURIL) 25 MG tablet Take 1 tablet (25 mg total) by mouth once daily. 90 tablet 3    hydrocortisone 2.5 % cream Apply topically 2 (two) times daily. 30 g 1    insulin (LANTUS SOLOSTAR U-100 INSULIN) glargine 100 units/mL SubQ pen Inject 10 Units into the skin every evening. 15 mL 3    lancets Misc To check BG 2 times daily, to use with insurance preferred meter 100  "each 3    lidocaine (LIDODERM) 5 % Place 1 patch onto the skin once daily. Apply to affected area daily for 12 hours, then remove for 12 hours. 30 patch 6    losartan (COZAAR) 50 MG tablet Take 1 tablet (50 mg total) by mouth once daily. 90 tablet 3    lovastatin (MEVACOR) 40 MG tablet Take 1 tablet (40 mg total) by mouth nightly. 90 tablet 3    metoprolol succinate (TOPROL-XL) 25 MG 24 hr tablet Take 1 tablet (25 mg total) by mouth once daily. 90 tablet 3    mometasone (NASONEX) 50 mcg/actuation nasal spray Instill 2 sprays in each nostril every evening. 17 g 3    multivitamin capsule Take 1 capsule by mouth once daily.      NIFEdipine (PROCARDIA-XL) 90 MG (OSM) 24 hr tablet Take 1 tablet (90 mg total) by mouth once daily. T 90 tablet 2    pantoprazole (PROTONIX) 40 MG tablet Take 1 tablet (40 mg total) by mouth once daily. 90 tablet 3    pen needle, diabetic 32 gauge x 5/32" Ndle 1 Units by Misc.(Non-Drug; Combo Route) route once daily. 90 each 3    rivaroxaban (XARELTO) 15 mg Tab Take 1 tablet (15 mg total) by mouth daily with dinner or evening meal. 30 tablet 11    SITagliptin phosphate (JANUVIA) 25 MG Tab Take 1 tablet (25 mg total) by mouth once daily. 90 tablet 3    traZODone (DESYREL) 150 MG tablet TAKE 1 TABLET(150 MG) BY MOUTH EVERY NIGHT 90 tablet 3     No current facility-administered medications for this visit.        Allergies: Propoxyphene n-acetaminophen     Immunizations:   Immunization History   Administered Date(s) Administered    COVID-19, MRNA, LN-S, PF (Pfizer) (Purple Cap) 02/17/2021, 02/17/2021, 03/10/2021, 03/10/2021, 10/02/2021    COVID-19, mRNA, LNP-S, bivalent booster, PF (PFIZER OMICRON) 09/30/2022    Influenza 10/05/2007, 11/24/2008, 10/08/2009, 10/22/2010, 11/01/2011    Influenza (FLUAD) - Quadrivalent - Adjuvanted - PF *Preferred* (65+) 10/01/2020, 01/19/2022    Influenza (FLUAD) - Trivalent - Adjuvanted - PF (65+) 11/19/2018    Influenza - High Dose - PF (65 years and older) " 11/29/2012, 11/05/2013, 10/16/2014, 12/02/2016, 11/06/2017, 09/26/2019, 09/30/2022    Influenza - Quadrivalent - High Dose - PF (65 years and older) 09/30/2022    Influenza - Quadrivalent - PF *Preferred* (6 months and older) 10/21/2015    Influenza Split 10/05/2007, 11/24/2008, 10/08/2009, 10/22/2010, 11/01/2011    Pneumococcal Conjugate - 13 Valent 12/28/2018    Pneumococcal Polysaccharide - 23 Valent 07/14/2010    Zoster 12/11/2013    Zoster Recombinant 04/15/2019         Review of Systems   Constitutional: Negative for fever, weight loss and weight gain.  Skin: Negative for rash, itchiness, dryness  HENT:  As per HPI  Cardiovascular: Negative for chest pain and dyspnea on exertion .   Respiratory: Is not experiencing shortness of breath.   Gastrointestinal: Negative for nausea and vomiting.   Neurological: Negative for headaches.   Lymph/Heme: Negative for lymphadenopathy or easy bruising  Musculoskeletal: Negative for joint or muscle pain  Psychiatric: The patient is not nervous/anxious.        All other systems are negative except for that listed in the HPI.      PHYSICAL EXAM:   Vital Signs:  BP (!) 148/81 (BP Location: Right arm, Patient Position: Sitting, BP Method: Large (Automatic))   Pulse 62   Wt 75 kg (165 lb 5.5 oz)   BMI 28.38 kg/m²      General:  Well-developed, well-nourished  Communication and Voice:  Clear pitch and clarity  Hearing: Hearing adequate for verbal communication bilaterally   Inspection:  Normocephalic and atraumatic without mass or lesion  Palpation:  Facial skeleton intact without bony stepoffs  Parotid Glands:  No mass or tenderness  Facial Strength:  Facial motility symmetric and full bilaterally  Pinna:  External ear intact and fully developed  External canal:  Canal is patent with intact skin  Tympanic Membrane:  Clear and mobile  External nose:  No scar or anatomic deformity  Internal Nose:  Septum intact with left-sided deviation.  At the base of the septum at the junction  with the floor on the right is a raised granulomatous area with eschar and clot with no active bleeding  No edema, polyp, or rhinorrhea.  TMJ:  No pain to palpation with full mobility  Oral cavity, Lips, Teeth, and Gums:  Mucosa and teeth intact and viable, No lesions, masses or ulcers  Oropharynx: No erythema or exudate, no masses or ulcerations, non-obstructive tonsils  Nasopharynx:  No mass or lesion with intact mucosa  Hypopharynx:  Not well visualized secondary to gagging  Larynx:  Not well visualized secondary to gagging  Neck, Trachea, Lymphatics:  Midline trachea without mass or lesion, no lymphadenopathy  Thyroid:  No mass or nodularity  Eyes: No nystagmus with equal extraocular motion bilaterally  Neuro/Psych/Balance: Patient oriented and appropriate in interaction;  Appropriate mood and affect;  Gait is intact with no imbalance; Cranial nerves I-XII are intact  Respiratory effort:  Equal inspiration and expiration without stridor  Peripheral Vascular:  Warm extremities with equal pulses       Procedure: Rigid endoscopic nasal exam   Surgeon: Aziza Demarco MD  Procedure note/findings: After informed discussion of the risks, benefits, and alternatives after indications as noted above, nasal cavities were topically anesthetized and decongested with 4% lidocaine and Afrin solutions. A rigid 0 degree nasal endoscope was inserted into bilateral nasal cavities and the following was noted    Right:   Again visualized a septal I scar and granulomatous area this does not extend beyond head of the turbinate and estimated at 6 x 6 mm no active bleeding anywhere else but very friable turbinates bilaterally  Both inferior and middle turbinates are normal without hypertrophy  The Osteomeatal complex including the middle and superior meatus does not show any polyps or lesions  The sphenoethmoid recess was clear    Left   Both inferior and middle turbinates are normal without hypertrophy  The Osteomeatal complex including  the middle and superior meatus does not show any polyps or lesions  The sphenoethmoid recess was clear    Nasopharynx, similarly, revealed no mass lesion or granularity. There was no ulceration. There was no gross asymmetry. Fossa of Rosenmueller was intact bilaterally. The eustachian tube orifices were intact bilaterally and patent.  The scope was then withdrawn and removed. She tolerated this well.            ASSESSMENT:   1. Lesion of nasal septum    2. Right-sided epistaxis            PLAN:   I strongly advised the patient not to manipulate nose anymore with no further picking at the lesion.  I suspect this is a benign granuloma.  I do not see reason to biopsy at this point.  I strongly recommended nightly ointment with Vaseline to the nostril and frequent saline spray.  If she has any bleeds she will apply Afrin and pressure from the outside without any manipulation or foreign body inside the nostril.  I will see her back in 3 months for reexamination      I believe that Ms. Bragg has a good understanding of the issues involved and I answered all of her questions.     DISCLAIMER: This note was prepared with [employer] voice recognition transcription software. Garbled syntax, mangled pronouns, and other bizarre constructions may be attributed to that software system. While efforts were made to correct any mistakes made by this voice recognition program, some errors and/or omissions may remain in the note that were missed when the note was originally created.

## 2024-07-02 DIAGNOSIS — I10 ESSENTIAL HYPERTENSION: ICD-10-CM

## 2024-07-03 RX ORDER — HYDROCHLOROTHIAZIDE 25 MG/1
25 TABLET ORAL DAILY
Qty: 90 TABLET | Refills: 3 | Status: SHIPPED | OUTPATIENT
Start: 2024-07-03

## 2024-07-03 NOTE — TELEPHONE ENCOUNTER
Refill Routing Note   Medication(s) are not appropriate for processing by [facility] for the following reason(s):        Required labs abnormal  Required vitals abnormal    [facility] action(s):  Defer     Requires labs : Yes             Appointments  past 12m or future 3m with PCP    Date Provider   Last Visit   12/22/2023 Anh Atkinson MD   Next Visit   10/18/2024 Anh Atkinson MD   ED visits in past 90 days: 0        Note composed:2:01 AM 07/03/2024

## 2024-07-03 NOTE — TELEPHONE ENCOUNTER
Care Due:                  Date            Visit Type   Department     Provider  --------------------------------------------------------------------------------                                MYCHART                              ANNUAL                              CHECKUP/PHY  MET INTERNAL  Last Visit: 12-      [facility]       Anh Atkinson  Next Visit: None Scheduled  None         None Found                                                            Last  Test          Frequency    Reason                     Performed    Due Date  --------------------------------------------------------------------------------    HBA1C.......  6 months...  SITagliptin, insulin.....  04- 09-    [facility] Embedded Care Due Messages. Reference number: [id number].   7/02/2024 10:34:39 PM CDT

## 2024-07-16 DIAGNOSIS — G47.00 INSOMNIA, UNSPECIFIED TYPE: ICD-10-CM

## 2024-07-17 RX ORDER — TRAZODONE HYDROCHLORIDE 150 MG/1
TABLET ORAL
Qty: 90 TABLET | Refills: 1 | Status: SHIPPED | OUTPATIENT
Start: 2024-07-17

## 2024-07-17 NOTE — TELEPHONE ENCOUNTER
No care due was identified.  [facility] Embedded Care Due Messages. Reference number: [id number].   7/16/2024 10:25:12 PM CDT

## 2024-07-17 NOTE — TELEPHONE ENCOUNTER
Refill Routing Note   Medication(s) are not appropriate for processing by [facility] for the following reason(s):        Drug-disease interaction    [facility] action(s):  Defer        Medication Therapy Plan: traZODone and Hypomagnesemia      Appointments  past 12m or future 3m with PCP    Date Provider   Last Visit   12/22/2023 Anh Atkinson MD   Next Visit   10/18/2024 Anh Atkinson MD   ED visits in past 90 days: 0        Note composed:10:33 PM 07/16/2024

## 2024-08-17 DIAGNOSIS — M41.9 SCOLIOSIS OF LUMBAR SPINE, UNSPECIFIED SCOLIOSIS TYPE: ICD-10-CM

## 2024-08-19 RX ORDER — BACLOFEN 10 MG/1
10 TABLET ORAL 2 TIMES DAILY
Qty: 60 TABLET | Refills: 3 | Status: SHIPPED | OUTPATIENT
Start: 2024-08-19

## 2024-08-19 NOTE — TELEPHONE ENCOUNTER
Refill Routing Note   Medication(s) are not appropriate for processing by [facility] for the following reason(s):        Outside of protocol    [facility] action(s):  Route               Appointments  past 12m or future 3m with PCP    Date Provider   Last Visit   12/22/2023 Anh Atkinson MD   Next Visit   10/18/2024 Anh Atkinson MD   ED visits in past 90 days: 0        Note composed:8:06 AM 08/19/2024

## 2024-08-21 ENCOUNTER — PATIENT MESSAGE (OUTPATIENT)
Dept: INTERNAL MEDICINE | Facility: CLINIC | Age: 79
End: 2024-08-21
Payer: MEDICARE

## 2024-08-22 NOTE — TELEPHONE ENCOUNTER
Pt son c/o pt has been having nose bleeds often. She has seen ENT, but they recommend based off the meds the pt is on that she discuss this with her PCP. They previously skipped one night of taking Xarelto and the nose bleeds stopped. They are asking if they can temporarily stop the blood thinners for a little while to let her nose heal?

## 2024-09-01 DIAGNOSIS — N18.32 STAGE 3B CHRONIC KIDNEY DISEASE: ICD-10-CM

## 2024-09-01 DIAGNOSIS — I10 ESSENTIAL HYPERTENSION: ICD-10-CM

## 2024-09-01 DIAGNOSIS — I48.0 PAROXYSMAL ATRIAL FIBRILLATION: ICD-10-CM

## 2024-09-02 NOTE — TELEPHONE ENCOUNTER
Refill Routing Note   Medication(s) are not appropriate for processing by [facility] for the following reason(s):        Required vitals abnormal    [facility] action(s):  Defer   Requires labs : Yes             Appointments  past 12m or future 3m with PCP    Date Provider   Last Visit   12/22/2023 Anh Atkinson MD   Next Visit   10/18/2024 Anh Atkinson MD   ED visits in past 90 days: 0        Note composed:11:45 PM 09/01/2024

## 2024-09-02 NOTE — TELEPHONE ENCOUNTER
Care Due:                  Date            Visit Type   Department     Provider  --------------------------------------------------------------------------------                                MYCHART                              ANNUAL                              CHECKUP/PHY  MET INTERNAL  Last Visit: 12-      S            MEDICINE       Anh Atkinson                              EP -                              PRIMARY      [facility] INTERNAL  Next Visit: 10-      CARE ([facility])   MEDICINE       Anh Atkinson                                                            Last  Test          Frequency    Reason                     Performed    Due Date  --------------------------------------------------------------------------------    HBA1C.......  6 months...  SITagliptin, insulin.....  04- 09-    [facility] Embedded Care Due Messages. Reference number: [id number].   9/01/2024 11:34:09 PM CDT

## 2024-09-03 RX ORDER — METOPROLOL SUCCINATE 25 MG/1
25 TABLET, EXTENDED RELEASE ORAL DAILY
Qty: 90 TABLET | Refills: 1 | Status: SHIPPED | OUTPATIENT
Start: 2024-09-03

## 2024-09-03 NOTE — TELEPHONE ENCOUNTER
Refill Routing Note   Medication(s) are not appropriate for processing by [facility] for the following reason(s):        Outside of protocol    [facility] action(s):  Route               Appointments  past 12m or future 3m with PCP    Date Provider   Last Visit   12/22/2023 Anh Atkinson MD   Next Visit   9/1/2024 Anh Atkinson MD   ED visits in past 90 days: 0        Note composed:8:53 AM 09/03/2024

## 2024-09-09 ENCOUNTER — PATIENT MESSAGE (OUTPATIENT)
Dept: OTOLARYNGOLOGY | Facility: CLINIC | Age: 79
End: 2024-09-09
Payer: MEDICARE

## 2024-10-03 DIAGNOSIS — Z71.89 COMPLEX CARE COORDINATION: ICD-10-CM

## 2024-10-09 RX ORDER — LOSARTAN POTASSIUM 50 MG/1
50 TABLET ORAL DAILY
Qty: 90 TABLET | Refills: 1 | Status: SHIPPED | OUTPATIENT
Start: 2024-10-09 | End: 2025-10-09

## 2024-10-09 NOTE — TELEPHONE ENCOUNTER
Refill Routing Note   Medication(s) are not appropriate for processing by [facility] for the following reason(s):        Required labs abnormal  Required vitals abnormal    [facility] action(s):  Defer               Appointments  past 12m or future 3m with PCP    Date Provider   Last Visit   12/22/2023 Anh Atkinson MD   Next Visit   10/18/2024 Anh Atkinson MD   ED visits in past 90 days: 0        Note composed:11:43 AM 10/09/2024

## 2024-10-09 NOTE — TELEPHONE ENCOUNTER
No care due was identified.  [facility] Embedded Care Due Messages. Reference number: [id number].   10/08/2024 8:56:56 PM CDT

## 2024-10-18 ENCOUNTER — LAB VISIT (OUTPATIENT)
Dept: LAB | Facility: HOSPITAL | Age: 79
End: 2024-10-18
Attending: INTERNAL MEDICINE
Payer: MEDICARE

## 2024-10-18 ENCOUNTER — OFFICE VISIT (OUTPATIENT)
Dept: INTERNAL MEDICINE | Facility: CLINIC | Age: 79
End: 2024-10-18
Payer: MEDICARE

## 2024-10-18 VITALS
SYSTOLIC BLOOD PRESSURE: 142 MMHG | BODY MASS INDEX: 28.38 KG/M2 | HEART RATE: 71 BPM | DIASTOLIC BLOOD PRESSURE: 70 MMHG | HEIGHT: 64 IN | TEMPERATURE: 97 F

## 2024-10-18 DIAGNOSIS — I48.92 ATRIAL FIBRILLATION/FLUTTER: ICD-10-CM

## 2024-10-18 DIAGNOSIS — N18.30 TYPE 2 DIABETES MELLITUS WITH STAGE 3 CHRONIC KIDNEY DISEASE, WITHOUT LONG-TERM CURRENT USE OF INSULIN, UNSPECIFIED WHETHER STAGE 3A OR 3B CKD: ICD-10-CM

## 2024-10-18 DIAGNOSIS — E11.22 TYPE 2 DIABETES MELLITUS WITH STAGE 3 CHRONIC KIDNEY DISEASE, WITHOUT LONG-TERM CURRENT USE OF INSULIN, UNSPECIFIED WHETHER STAGE 3A OR 3B CKD: ICD-10-CM

## 2024-10-18 DIAGNOSIS — I48.91 ATRIAL FIBRILLATION/FLUTTER: ICD-10-CM

## 2024-10-18 DIAGNOSIS — I10 ESSENTIAL HYPERTENSION: ICD-10-CM

## 2024-10-18 DIAGNOSIS — I10 ESSENTIAL HYPERTENSION: Primary | ICD-10-CM

## 2024-10-18 DIAGNOSIS — Z23 NEED FOR VACCINATION: ICD-10-CM

## 2024-10-18 LAB
ALBUMIN SERPL BCP-MCNC: 3.4 G/DL (ref 3.5–5.2)
ALP SERPL-CCNC: 177 U/L (ref 40–150)
ALT SERPL W/O P-5'-P-CCNC: 124 U/L (ref 10–44)
ANION GAP SERPL CALC-SCNC: 11 MMOL/L (ref 8–16)
AST SERPL-CCNC: 123 U/L (ref 10–40)
BASOPHILS # BLD AUTO: 0.02 K/UL (ref 0–0.2)
BASOPHILS NFR BLD: 0.2 % (ref 0–1.9)
BILIRUB SERPL-MCNC: 0.6 MG/DL (ref 0.1–1)
BUN SERPL-MCNC: 20 MG/DL (ref 8–23)
CALCIUM SERPL-MCNC: 9.4 MG/DL (ref 8.7–10.5)
CHLORIDE SERPL-SCNC: 103 MMOL/L (ref 95–110)
CO2 SERPL-SCNC: 25 MMOL/L (ref 23–29)
CREAT SERPL-MCNC: 1.5 MG/DL (ref 0.5–1.4)
DIFFERENTIAL METHOD BLD: ABNORMAL
EOSINOPHIL # BLD AUTO: 0.1 K/UL (ref 0–0.5)
EOSINOPHIL NFR BLD: 1.5 % (ref 0–8)
ERYTHROCYTE [DISTWIDTH] IN BLOOD BY AUTOMATED COUNT: 14 % (ref 11.5–14.5)
EST. GFR  (NO RACE VARIABLE): 35.2 ML/MIN/1.73 M^2
ESTIMATED AVG GLUCOSE: 151 MG/DL (ref 68–131)
GLUCOSE SERPL-MCNC: 174 MG/DL (ref 70–110)
HBA1C MFR BLD: 6.9 % (ref 4–5.6)
HCT VFR BLD AUTO: 38.8 % (ref 37–48.5)
HGB BLD-MCNC: 12.7 G/DL (ref 12–16)
IMM GRANULOCYTES # BLD AUTO: 0.04 K/UL (ref 0–0.04)
IMM GRANULOCYTES NFR BLD AUTO: 0.5 % (ref 0–0.5)
LYMPHOCYTES # BLD AUTO: 1.5 K/UL (ref 1–4.8)
LYMPHOCYTES NFR BLD: 17.5 % (ref 18–48)
MCH RBC QN AUTO: 28.3 PG (ref 27–31)
MCHC RBC AUTO-ENTMCNC: 32.7 G/DL (ref 32–36)
MCV RBC AUTO: 87 FL (ref 82–98)
MONOCYTES # BLD AUTO: 0.4 K/UL (ref 0.3–1)
MONOCYTES NFR BLD: 4.3 % (ref 4–15)
NEUTROPHILS # BLD AUTO: 6.5 K/UL (ref 1.8–7.7)
NEUTROPHILS NFR BLD: 76 % (ref 38–73)
NRBC BLD-RTO: 0 /100 WBC
PLATELET # BLD AUTO: 266 K/UL (ref 150–450)
PMV BLD AUTO: 12.7 FL (ref 9.2–12.9)
POTASSIUM SERPL-SCNC: 3.5 MMOL/L (ref 3.5–5.1)
PROT SERPL-MCNC: 8.3 G/DL (ref 6–8.4)
RBC # BLD AUTO: 4.48 M/UL (ref 4–5.4)
SODIUM SERPL-SCNC: 139 MMOL/L (ref 136–145)
TSH SERPL DL<=0.005 MIU/L-ACNC: 1.61 UIU/ML (ref 0.4–4)
WBC # BLD AUTO: 8.61 K/UL (ref 3.9–12.7)

## 2024-10-18 PROCEDURE — 80053 COMPREHEN METABOLIC PANEL: CPT | Performed by: INTERNAL MEDICINE

## 2024-10-18 PROCEDURE — 90653 IIV ADJUVANT VACCINE IM: CPT | Mod: PBBFAC,PO

## 2024-10-18 PROCEDURE — G0008 ADMIN INFLUENZA VIRUS VAC: HCPCS | Mod: PBBFAC,PO

## 2024-10-18 PROCEDURE — 99999PBSHW FLU VACCINE - ADJUVANTED: Mod: PBBFAC,,,

## 2024-10-18 PROCEDURE — 99999 PR PBB SHADOW E&M-EST. PATIENT-LVL III: CPT | Mod: PBBFAC,,, | Performed by: INTERNAL MEDICINE

## 2024-10-18 PROCEDURE — 99215 OFFICE O/P EST HI 40 MIN: CPT | Mod: S$PBB,,, | Performed by: INTERNAL MEDICINE

## 2024-10-18 PROCEDURE — 83036 HEMOGLOBIN GLYCOSYLATED A1C: CPT | Performed by: INTERNAL MEDICINE

## 2024-10-18 PROCEDURE — 85025 COMPLETE CBC W/AUTO DIFF WBC: CPT | Performed by: INTERNAL MEDICINE

## 2024-10-18 PROCEDURE — 99213 OFFICE O/P EST LOW 20 MIN: CPT | Mod: PBBFAC,PO | Performed by: INTERNAL MEDICINE

## 2024-10-18 PROCEDURE — G2211 COMPLEX E/M VISIT ADD ON: HCPCS | Mod: S$PBB,,, | Performed by: INTERNAL MEDICINE

## 2024-10-18 PROCEDURE — 84443 ASSAY THYROID STIM HORMONE: CPT | Performed by: INTERNAL MEDICINE

## 2024-10-18 NOTE — PROGRESS NOTES
"CC:  Annual review of chronic medical problems  HPI:   The patient is a 78 y.o. old female who presents to the office for annual review of chronic medical problems.      URINARY INCONTINENCE:  She reports worsening urinary incontinence with frequent urination, describing that she urinates "all the time." The condition has progressed to requiring protective pads on chairs and in bed. She uses Poise pads, which she refers to as "pull-ups." Sometimes she feels the urge to urinate but is unable to void when attempting to use the bathroom. She denies pain with urination or blood in urine.    NOSEBLEEDS AND ENT:  She reports recurrent nosebleeds and denies nose picking. Previous treatments included Vaseline application, Afrin use, and an unsuccessful cauterization attempt. She notes blood in her phlegm and occasional postnasal drip. She experiences ear itching but denies ear pain, drainage, or dizziness.    CARDIOVASCULAR:  She has a history of atrial fibrillation and is currently taking Xarelto as a anticoagulant. She reports improved stability and fewer falls compared to previous episodes.    FALLS AND MOBILITY:  She reports a history of frequent falls, previously occurring approximately twice weekly, often in the bathroom or specific areas of the home. Recent improvement in fall frequency is noted due to implemented safety measures, including  socks and shower bars. She acknowledges the need to move more slowly and intentionally to prevent falls, particularly when walking outside on uneven surfaces.    GASTROINTESTINAL:  She reports morning nausea and vomiting upon waking, even before eating, accompanied by abdominal pain. These symptoms began several months ago and occur intermittently without identifiable dietary triggers. She has attempted to mitigate symptoms by preparing more bland foods. She also experiences bowel issues when consuming coffee, which she enjoys despite its effects on her GI system.    PAIN AND " "NEUROLOGICAL:  She reports chronic back pain and headaches, describing a pounding sensation localized to areas of previous head injuries. She denies rashes, blurred vision, light sensitivity, eye pain, dizziness, joint stiffness, or joint swelling.    RESPIRATORY:  She reports experiencing shortness of breath when walking. She denies hoarseness, change in voice, swollen glands, wheezing, or cough.    EDEMA:  She reports swelling in her legs and feet.    SKIN:  She reports experiencing peeling skin all over her body, describing the condition as being in a "reptilian stage."    FAMILY HISTORY:  She reports a significant family history of heart issues, stating that most of her family members have had cardiac problems. She and her sister are the only remaining members of her immediate family.    MEDICATIONS:  She reports current use of an insulin pen for diabetes management.    DIET AND HYDRATION:  She reports drinking orange juice regularly and is attempting to increase her water intake.    DENTAL:  She reports a history of multiple tooth loss and is considering dental prosthetics as a potential solution.      ROS:  Constitutional: -dizziness  Head: +headaches  Eyes: -vision changes, -eye pain  ENT: +post nasal drip  Respiratory: +shortness of breath, -cough, -wheezing  Gastrointestinal: +nausea, +vomiting  Genitourinary: +frequency, +urinary incontinence  Musculoskeletal: -joint pain, -joint swelling, +back pain  Integumentary: +rash       PAST MEDICAL HISTORY  Past Medical History:   Diagnosis Date    Alternating exotropia 04/18/2013    Colon polyps 08/13/2019    Diabetes mellitus, type 2     Disorder of kidney and ureter     GERD (gastroesophageal reflux disease)     Hyperlipidemia     Hypertension     Lumbar spondylosis 04/16/2021    Menopausal state     Multiple gastric polyps     on EGD in 2004    Pneumonia of right middle lobe due to infectious organism 03/19/2019    Right middle lobe pneumonia 03/19/2019    " Sickle cell trait        SURGICAL HISTORY:  Past Surgical History:   Procedure Laterality Date    CATARACT EXTRACTION W/  INTRAOCULAR LENS IMPLANT Left 06/05/2018    Dr. Castillo    CHOLECYSTECTOMY      laproscopic cholecystectomy    COLONOSCOPY N/A 08/13/2019    Procedure: COLONOSCOPY;  Surgeon: Jeffrey Maynard MD;  Location: [facility] ENDO (4TH FLR);  Service: Endoscopy;  Laterality: N/A;    ESOPHAGOGASTRODUODENOSCOPY N/A 08/13/2019    Procedure: ESOPHAGOGASTRODUODENOSCOPY (EGD);  Surgeon: Jeffrey Maynard MD;  Location: [facility] ENDO (4TH FLR);  Service: Endoscopy;  Laterality: N/A;    HYSTERECTOMY      DENISHA/BSO - fibroids    INJECTION OF ANESTHETIC AGENT AROUND MEDIAL BRANCH NERVES INNERVATING LUMBAR FACET JOINT Bilateral 05/27/2021    Procedure: Block-nerve-medial branch-lumbar--Bilateral L4-5, L5-S1;  Surgeon: Ashish Ren Jr., MD;  Location: [facility] PAIN MGT;  Service: Pain Management;  Laterality: Bilateral;    INJECTION OF ANESTHETIC AGENT AROUND MEDIAL BRANCH NERVES INNERVATING LUMBAR FACET JOINT Bilateral 06/10/2021    Procedure: Block-nerve-medial branch-lumbar--bilateral-- L4-5, L5-S1;  Surgeon: Ashish Ren Jr., MD;  Location: [facility] PAIN MGT;  Service: Pain Management;  Laterality: Bilateral;  patient is diabetic    INTRAOCULAR PROSTHESES INSERTION Left 06/05/2018    Procedure: INSERTION-INTRAOCULAR LENS (IOL);  Surgeon: Ga Castillo MD;  Location: [facility] OR [address] [place] [address];  Service: Ophthalmology;  Laterality: Left;    Laparoscopic right hemicolectomy      OOPHORECTOMY      PHACOEMULSIFICATION OF CATARACT Left 06/05/2018    Procedure: PHACOEMULSIFICATION-ASPIRATION-CATARACT;  Surgeon: Ga Castillo MD;  Location: [facility] OR [address] [place] [address];  Service: Ophthalmology;  Laterality: Left;       PE:   Vitals:  Vitals:    10/18/24 1526   BP: (!) 142/70   Pulse: 71   Temp: 97.3 °F (36.3 °C)       APPEARANCE: Well nourished, well developed, in no acute distress.    EYES: Sclerae anicteric. PERRL. EOMI.      EARS: TM's  intact. No retraction or perforation.    NOSE: Mucosa pink. Airway clear.  MOUTH & THROAT: No tonsillar enlargement. No pharyngeal erythema or exudate. No stridor.  NECK: Supple, no thyromegaly.  CHEST: Lungs clear to auscultation with unlabored respirations.  CARDIOVASCULAR: Irregularly, irregular S1, S2. No murmurs. No carotid bruits. No pedal edema.  ABDOMEN: Bowel sounds normal. Not distended. Soft. No tenderness or masses.   MUSCULOSKELETAL:  In wheelchair.  SKIN: Normal skin turgor, warm and dry.  NEUROLOGIC: Cranial Nerves: Intact.  PSYCHIATRIC: The patient is oriented to person, place, and time and has a pleasant affect.        ASSESSMENT/PLAN:  Val was seen today for annual exam, epistaxis and back pain.    Diagnoses and all orders for this visit:    Essential hypertension  -     Comprehensive Metabolic Panel; Future  -     CBC Auto Differential; Future  -     TSH; Future  -     blood pressure is mildly elevated, monitor    Need for vaccination  -     Influenza - Trivalent (Adjuvanted)    Atrial fibrillation/flutter  -     Ambulatory referral/consult to Cardiology; Future  -     stable, continue anticoagulation    Type 2 diabetes mellitus with stage 3 chronic kidney disease, without long-term current use of insulin, unspecified whether stage 3a or 3b CKD  -     Hemoglobin A1C; Future        Assessed worsening urinary incontinence symptoms  Considered impact of diuretic medication on incontinence symptoms  Evaluated ongoing nosebleeds in context of anticoagulation therapy  Weighed risks and benefits of continuing Xarelto vs. switching to aspirin, given fall risk and nosebleeds  Decided against recommending GLP-1 agonists for weight loss due to potential impact on impaired kidney function  Noted patient remains in atrial fibrillation, necessitating continued anticoagulation pending cardiology evaluation    ATRIAL FIBRILLATION:  - Explained rationale for anticoagulation in atrial fibrillation, including  stroke risk.  - Continued Xarelto pending cardiology evaluation.  - Referred to Cardiology for evaluation of atrial fibrillation and anticoagulation management.    SKIN ISSUES:  - Discussed importance of moisturizing skin daily to address peeling issues.  - Val to moisturize skin daily.    DIETARY MANAGEMENT:  - Advised on sugar content in juices and recommended lower sugar alternatives.  - Recommend limiting juice intake, opting for lower sugar options when consumed.  - Val to follow up juice consumption with water.    NOSEBLEEDS:  - Val to use saline nasal gel to help prevent nosebleeds.    LABS:  - Ordered CBC, electrolytes, thyroid function tests, and hemoglobin A1C.    FOLLOW UP:  - Follow up in 4 months.

## 2024-10-20 DIAGNOSIS — G47.00 INSOMNIA, UNSPECIFIED TYPE: ICD-10-CM

## 2024-10-21 RX ORDER — DIAZEPAM 5 MG/1
5 TABLET ORAL NIGHTLY
Qty: 30 TABLET | Refills: 3 | Status: SHIPPED | OUTPATIENT
Start: 2024-10-21

## 2024-10-21 NOTE — TELEPHONE ENCOUNTER
No care due was identified.  [facility] Embedded Care Due Messages. Reference number: [id number].   10/20/2024 11:35:13 PM CDT

## 2024-10-25 ENCOUNTER — TELEPHONE (OUTPATIENT)
Dept: INTERNAL MEDICINE | Facility: CLINIC | Age: 79
End: 2024-10-25
Payer: MEDICARE

## 2024-10-25 DIAGNOSIS — R74.8 ELEVATED LIVER ENZYMES: Primary | ICD-10-CM

## 2024-10-25 NOTE — TELEPHONE ENCOUNTER
Please inform patient that liver enzymes are elevated.  Hold lovastatin and repeat labs in 2 weeks.

## 2024-12-02 ENCOUNTER — OFFICE VISIT (OUTPATIENT)
Dept: CARDIOLOGY | Facility: CLINIC | Age: 79
End: 2024-12-02
Payer: MEDICARE

## 2024-12-02 VITALS — HEART RATE: 94 BPM | DIASTOLIC BLOOD PRESSURE: 84 MMHG | SYSTOLIC BLOOD PRESSURE: 173 MMHG

## 2024-12-02 DIAGNOSIS — I48.92 ATRIAL FIBRILLATION/FLUTTER: Primary | ICD-10-CM

## 2024-12-02 DIAGNOSIS — R04.0 ACUTE ANTERIOR EPISTAXIS: ICD-10-CM

## 2024-12-02 DIAGNOSIS — R29.6 FALLS: ICD-10-CM

## 2024-12-02 DIAGNOSIS — I10 ESSENTIAL HYPERTENSION: ICD-10-CM

## 2024-12-02 DIAGNOSIS — Z79.01 CHRONIC ANTICOAGULATION: ICD-10-CM

## 2024-12-02 DIAGNOSIS — I48.91 ATRIAL FIBRILLATION/FLUTTER: Primary | ICD-10-CM

## 2024-12-02 DIAGNOSIS — E78.00 PURE HYPERCHOLESTEROLEMIA: ICD-10-CM

## 2024-12-02 DIAGNOSIS — N18.32 STAGE 3B CHRONIC KIDNEY DISEASE: ICD-10-CM

## 2024-12-02 PROCEDURE — 99204 OFFICE O/P NEW MOD 45 MIN: CPT | Mod: S$PBB,,, | Performed by: INTERNAL MEDICINE

## 2024-12-02 PROCEDURE — 99213 OFFICE O/P EST LOW 20 MIN: CPT | Mod: PBBFAC | Performed by: INTERNAL MEDICINE

## 2024-12-02 PROCEDURE — 99999 PR PBB SHADOW E&M-EST. PATIENT-LVL III: CPT | Mod: PBBFAC,,, | Performed by: INTERNAL MEDICINE

## 2024-12-02 PROCEDURE — 93005 ELECTROCARDIOGRAM TRACING: CPT

## 2024-12-02 PROCEDURE — 93010 ELECTROCARDIOGRAM REPORT: CPT | Mod: ,,, | Performed by: INTERNAL MEDICINE

## 2024-12-02 RX ORDER — SPIRONOLACTONE 25 MG/1
25 TABLET ORAL DAILY
Qty: 30 TABLET | Refills: 11 | Status: SHIPPED | OUTPATIENT
Start: 2024-12-02 | End: 2025-12-02

## 2024-12-02 NOTE — PROGRESS NOTES
Subjective:   12/02/2024     Patient ID:  Val Bragg is a 79 y.o. female who presents for evaulation of Establish Care, Hyperlipidemia (Nose bleed that clot sometimes. pt is on blood thinners so), and Atrial Fibrillation      Patient with atrial fibrillation.  She is anticoagulated with Xarelto.  Currently on Xarelto 15 mg daily.  Her creatinine clearance is calculated to be 35.4.  Dose appropriate.  She has been having problems with falls and bleeding, mainly nosebleeds.  She is on appropriate dose Xarelto.  She does not take aspirin.      Blood pressure appears to be elevated today, 173/84.  She does have lower extremity edema and dyspnea.      Past Medical History:   Diagnosis Date    Alternating exotropia 04/18/2013    Colon polyps 08/13/2019    Diabetes mellitus, type 2     Disorder of kidney and ureter     GERD (gastroesophageal reflux disease)     Hyperlipidemia     Hypertension     Lumbar spondylosis 04/16/2021    Menopausal state     Multiple gastric polyps     on EGD in 2004    Pneumonia of right middle lobe due to infectious organism 03/19/2019    Right middle lobe pneumonia 03/19/2019    Sickle cell trait        Review of patient's allergies indicates:   Allergen Reactions    Propoxyphene n-acetaminophen Anaphylaxis         Current Outpatient Medications:     baclofen (LIORESAL) 10 MG tablet, Take 1 tablet (10 mg total) by mouth 2 (two) times daily., Disp: 60 tablet, Rfl: 3    blood sugar diagnostic Strp, To check BG 2 times daily, to use with insurance preferred meter, Disp: 100 strip, Rfl: 0    blood-glucose meter kit, Use meter to check blood sugar 2 (two) times a day (Patient taking differently: Use meter to check blood sugar 2 (two) times a day), Disp: 1 each, Rfl: 0    diazePAM (VALIUM) 5 MG tablet, Take 1 tablet (5 mg total) by mouth every evening., Disp: 30 tablet, Rfl: 3    flash glucose sensor (FREESTYLE TERESA 2 SENSOR) Kit, Change every 14 days., Disp: 2 kit, Rfl: 11    FLUoxetine 40 MG  "capsule, Take 1 capsule (40 mg total) by mouth once daily., Disp: 90 capsule, Rfl: 2    furosemide (LASIX) 40 MG tablet, Take 1 tablet (40 mg total) by mouth daily as needed., Disp: 10 tablet, Rfl: 0    gabapentin (NEURONTIN) 300 MG capsule, Take one capsule by mouth every morning and two capsules every evening, Disp: 270 capsule, Rfl: 3    hydroCHLOROthiazide (HYDRODIURIL) 25 MG tablet, Take 1 tablet (25 mg total) by mouth once daily., Disp: 90 tablet, Rfl: 3    hydrocortisone 2.5 % cream, Apply topically 2 (two) times daily., Disp: 30 g, Rfl: 1    insulin glargine U-100, Lantus, (LANTUS SOLOSTAR U-100 INSULIN) 100 unit/mL (3 mL) InPn pen, Inject 10 Units into the skin every evening., Disp: 15 mL, Rfl: 3    lancets Misc, To check BG 2 times daily, to use with insurance preferred meter, Disp: 100 each, Rfl: 3    lidocaine (LIDODERM) 5 %, Place 1 patch onto the skin once daily. Apply to affected area daily for 12 hours, then remove for 12 hours., Disp: 30 patch, Rfl: 6    losartan (COZAAR) 50 MG tablet, Take 1 tablet (50 mg total) by mouth once daily., Disp: 90 tablet, Rfl: 1    lovastatin (MEVACOR) 40 MG tablet, Take 1 tablet (40 mg total) by mouth nightly., Disp: 90 tablet, Rfl: 3    metoprolol succinate (TOPROL-XL) 25 MG 24 hr tablet, Take 1 tablet (25 mg total) by mouth once daily., Disp: 90 tablet, Rfl: 1    mometasone (NASONEX) 50 mcg/actuation nasal spray, Instill 2 sprays in each nostril every evening., Disp: 17 g, Rfl: 3    multivitamin capsule, Take 1 capsule by mouth once daily., Disp: , Rfl:     NIFEdipine (PROCARDIA-XL) 90 MG (OSM) 24 hr tablet, Take 1 tablet (90 mg total) by mouth once daily. T, Disp: 90 tablet, Rfl: 2    pantoprazole (PROTONIX) 40 MG tablet, Take 1 tablet (40 mg total) by mouth once daily., Disp: 90 tablet, Rfl: 3    pen needle, diabetic 32 gauge x 5/32" Ndle, 1 Units by Misc.(Non-Drug; Combo Route) route once daily., Disp: 90 each, Rfl: 3    rivaroxaban (XARELTO) 15 mg Tab, Take 1 " tablet (15 mg total) by mouth daily with dinner or evening meal., Disp: 30 tablet, Rfl: 11    SITagliptin phosphate (JANUVIA) 25 MG Tab, Take 1 tablet (25 mg total) by mouth once daily., Disp: 90 tablet, Rfl: 3    traZODone (DESYREL) 150 MG tablet, TAKE 1 TABLET(150 MG) BY MOUTH EVERY NIGHT, Disp: 90 tablet, Rfl: 1    spironolactone (ALDACTONE) 25 MG tablet, Take 1 tablet (25 mg total) by mouth once daily., Disp: 30 tablet, Rfl: 11     Objective:   Review of Systems   Cardiovascular:  Positive for dyspnea on exertion and leg swelling. Negative for chest pain, claudication, cyanosis, irregular heartbeat, near-syncope, orthopnea, palpitations, paroxysmal nocturnal dyspnea and syncope.         Vitals:    12/02/24 1405   BP: (!) 173/84   Pulse: 94     Wt Readings from Last 3 Encounters:   06/17/24 75 kg (165 lb 5.5 oz)   06/06/24 93 kg (205 lb)   04/25/24 93.2 kg (205 lb 7.5 oz)     Temp Readings from Last 3 Encounters:   10/18/24 97.3 °F (36.3 °C) (Temporal)   04/25/24 97.8 °F (36.6 °C) (Oral)   12/22/23 97 °F (36.1 °C) (Temporal)     BP Readings from Last 3 Encounters:   12/02/24 (!) 173/84   10/18/24 (!) 142/70   06/17/24 (!) 148/81     Pulse Readings from Last 3 Encounters:   12/02/24 94   10/18/24 71   06/17/24 62             Physical Exam  Vitals reviewed.   Constitutional:       General: She is not in acute distress.     Appearance: She is well-developed.   HENT:      Head: Normocephalic and atraumatic.      Nose: Nose normal.   Eyes:      Conjunctiva/sclera: Conjunctivae normal.      Pupils: Pupils are equal, round, and reactive to light.   Neck:      Vascular: No carotid bruit or JVD.   Cardiovascular:      Rate and Rhythm: Normal rate. Rhythm irregular.      Pulses: Intact distal pulses.      Heart sounds: Normal heart sounds. No murmur heard.     No friction rub. No gallop.   Pulmonary:      Effort: Pulmonary effort is normal. No respiratory distress.      Breath sounds: Normal breath sounds. No wheezing or  rales.   Chest:      Chest wall: No tenderness.   Abdominal:      General: Bowel sounds are normal. There is no distension.      Palpations: Abdomen is soft.      Tenderness: There is no abdominal tenderness.   Musculoskeletal:         General: No tenderness or deformity. Normal range of motion.      Cervical back: Normal range of motion and neck supple.      Right lower leg: Edema present.      Left lower leg: Edema present.   Skin:     General: Skin is warm and dry.      Findings: No erythema or rash.   Neurological:      Mental Status: She is alert and oriented to person, place, and time.      Cranial Nerves: No cranial nerve deficit.      Motor: No abnormal muscle tone.      Coordination: Coordination normal.   Psychiatric:         Behavior: Behavior normal.         Thought Content: Thought content normal.         Judgment: Judgment normal.           Lab Results   Component Value Date    CHOL 136 04/01/2024    CHOL 140 01/19/2022    CHOL 152 09/27/2021     Lab Results   Component Value Date    HDL 41 04/01/2024    HDL 48 01/19/2022    HDL 57 09/27/2021     Lab Results   Component Value Date    LDLCALC 71.4 04/01/2024    LDLCALC 60.4 (L) 01/19/2022    LDLCALC 72.4 09/27/2021     Lab Results   Component Value Date     (H) 10/18/2024     (H) 10/18/2024    AST 32 04/01/2024    AST 47 (H) 06/30/2023     Lab Results   Component Value Date    CREATININE 1.5 (H) 10/18/2024    BUN 20 10/18/2024     10/18/2024    K 3.5 10/18/2024    CO2 25 10/18/2024    CO2 25 04/01/2024    CO2 26 06/30/2023     Lab Results   Component Value Date    HGB 12.7 10/18/2024    HCT 38.8 10/18/2024    HCT 39.9 04/01/2024    HCT 38.0 03/08/2022           EKG today shows atrial fibrillation with a heart rate of 77 beats per minute.              Assessment and Plan:     Atrial fibrillation/flutter  Comments:  Refer to EP for consideration of Watchman device  Orders:  -     Ambulatory referral/consult to Cardiology  -      Ambulatory referral/consult to Cardiac Electrophysiology; Future; Expected date: 12/09/2024    Essential hypertension  Comments:  Poorly controlled   Add spironolactone  Lab in 2 weeks  Orders:  -     spironolactone (ALDACTONE) 25 MG tablet; Take 1 tablet (25 mg total) by mouth once daily.  Dispense: 30 tablet; Refill: 11  -     Basic Metabolic Panel; Future; Expected date: 12/09/2024  -     NT-Pro Natriuretic Peptide; Future; Expected date: 12/02/2024  -     Echo; Future; Expected date: 12/09/2024    Pure hypercholesterolemia  Comments:  Continue statin therapy    Stage 3b chronic kidney disease    Falls    Acute anterior epistaxis    Chronic anticoagulation  Comments:  Dose appropriate       Patient with falls and epistaxis, could be considered for Watchman device.  Will refer to EP.      Blood pressure is poorly controlled, will add spironolactone, follow-up in 2 weeks with repeat lab, reassess blood pressure controlled at that time.       No follow-ups on file.          Future Appointments   Date Time Provider Department Center   1/3/2025  8:45 AM CV [facility] E[facility] [place]   2/7/2025  3:00 PM Anh Atkinson MD [facility] [place]

## 2024-12-03 LAB
OHS QRS DURATION: 88 MS
OHS QTC CALCULATION: 418 MS

## 2024-12-16 DIAGNOSIS — M41.9 SCOLIOSIS OF LUMBAR SPINE, UNSPECIFIED SCOLIOSIS TYPE: ICD-10-CM

## 2024-12-17 RX ORDER — BACLOFEN 10 MG/1
10 TABLET ORAL 2 TIMES DAILY
Qty: 60 TABLET | Refills: 3 | Status: SHIPPED | OUTPATIENT
Start: 2024-12-17

## 2024-12-17 NOTE — TELEPHONE ENCOUNTER
Refill Routing Note   Medication(s) are not appropriate for processing by [facility] for the following reason(s):      Medication outside of protocol    [facility] action(s):  Route Care Due:  None identified            Appointments  past 12m or future 3m with PCP    Date Provider   Last Visit   10/18/2024 Anh Atkinson MD   Next Visit   2/7/2025 Anh Atkinson MD   ED visits in past 90 days: 0        Note composed:12:37 PM 12/17/2024

## 2024-12-19 ENCOUNTER — TELEPHONE (OUTPATIENT)
Dept: ELECTROPHYSIOLOGY | Facility: CLINIC | Age: 79
End: 2024-12-19
Payer: MEDICARE

## 2024-12-19 DIAGNOSIS — I48.0 PAROXYSMAL ATRIAL FIBRILLATION: Primary | ICD-10-CM

## 2024-12-19 NOTE — TELEPHONE ENCOUNTER
Called patient, let her know that she is an established patient since she was last seen in our clinic in 2022, and since she is a patient of Dr. Hall I will be canceling her appointment, asked her to give me a call back if she would like to reschedule as an established patient with Dr. Hall or with a Nurse Practitioner.

## 2024-12-23 DIAGNOSIS — Z78.0 MENOPAUSE: ICD-10-CM

## 2025-01-13 DIAGNOSIS — Z00.00 ENCOUNTER FOR MEDICARE ANNUAL WELLNESS EXAM: ICD-10-CM

## 2025-01-14 ENCOUNTER — PATIENT MESSAGE (OUTPATIENT)
Dept: CARDIOLOGY | Facility: CLINIC | Age: 80
End: 2025-01-14
Payer: MEDICARE

## 2025-01-19 DIAGNOSIS — F32.A DEPRESSION, UNSPECIFIED DEPRESSION TYPE: ICD-10-CM

## 2025-01-20 NOTE — TELEPHONE ENCOUNTER
Care Due:                  Date            Visit Type   Department     Provider  --------------------------------------------------------------------------------                                EP -                              PRIMARY      [facility] INTERNAL  Last Visit: 10-      CARE ([facility])   MEDICINE       nAh Atkinson                              [facility]                              PRIMARY      [facility] INTERNAL  Next Visit: 02-      CARE ([facility])   [facility]       Anh Atkinson                                                            Last  Test          Frequency    Reason                     Performed    Due Date  --------------------------------------------------------------------------------    HBA1C.......  6 months...  SITagliptin, insulin.....  10-   04-    Lipid Panel.  12 months..  lovastatin...............  04- 03-    [facility] Embedded Care Due Messages. Reference number: [id number].   1/19/2025 9:17:46 PM CST

## 2025-01-21 RX ORDER — FLUOXETINE HYDROCHLORIDE 40 MG/1
40 CAPSULE ORAL DAILY
Qty: 90 CAPSULE | Refills: 2 | Status: SHIPPED | OUTPATIENT
Start: 2025-01-21

## 2025-01-21 NOTE — TELEPHONE ENCOUNTER
Provider Staff:  Action required for this patient    Requires labs      Please see care gap opportunities below in Care Due Message.    Thanks!  [facility]     Appointments      Date Provider   Last Visit   10/18/2024 Anh Atkinson MD   Next Visit   2/7/2025 Anh Atkinson MD     Refill Decision Note   Val Bragg  is requesting a refill authorization.  Brief Assessment and Rationale for Refill:  Approve     Medication Therapy Plan:         Comments:     Note composed:11:12 AM 01/21/2025            Unable to obtain med rec at this time. Patient states she is from [facility] home [place]. Unable to find a valid phone number. Sister on demographics unable to provide with facility address or phone number. Other demographic contacted and no answer.   No MAR at bedside or in chart

## 2025-02-16 DIAGNOSIS — E78.5 HYPERLIPIDEMIA, UNSPECIFIED HYPERLIPIDEMIA TYPE: ICD-10-CM

## 2025-02-17 RX ORDER — LOVASTATIN 40 MG/1
40 TABLET ORAL NIGHTLY
Qty: 90 TABLET | Refills: 2 | Status: SHIPPED | OUTPATIENT
Start: 2025-02-17

## 2025-02-17 RX ORDER — NIFEDIPINE 90 MG/1
90 TABLET, EXTENDED RELEASE ORAL DAILY
Qty: 90 TABLET | Refills: 2 | Status: SHIPPED | OUTPATIENT
Start: 2025-02-17

## 2025-02-17 NOTE — TELEPHONE ENCOUNTER
Refill Routing Note   Medication(s) are not appropriate for processing by [facility] for the following reason(s):        Required vitals abnormal    [facility] action(s):  Defer               Appointments  past 12m or future 3m with PCP    Date Provider   Last Visit   10/18/2024 Anh Atkinson MD   Next Visit   2/16/2025 Anh Atkinson MD   ED visits in past 90 days: 0        Note composed:12:27 PM 02/17/2025

## 2025-02-17 NOTE — TELEPHONE ENCOUNTER
No care due was identified.  [facility] Embedded Care Due Messages. Reference number: [id number].   2/16/2025 11:13:40 PM CST

## 2025-02-17 NOTE — TELEPHONE ENCOUNTER
No care due was identified.  [facility] Embedded Care Due Messages. Reference number: [id number].   2/16/2025 11:14:12 PM CST

## 2025-02-17 NOTE — TELEPHONE ENCOUNTER
Refill Routing Note   Medication(s) are not appropriate for processing by [facility] for the following reason(s):        Required labs abnormal    [facility] action(s):  Defer               Appointments  past 12m or future 3m with PCP    Date Provider   Last Visit   10/18/2024 Anh Atkinson MD   Next Visit   5/23/2025 Anh Atkinson MD   ED visits in past 90 days: 0        Note composed:9:38 AM 02/17/2025

## 2025-02-19 ENCOUNTER — TELEPHONE (OUTPATIENT)
Dept: CARDIOLOGY | Facility: CLINIC | Age: 80
End: 2025-02-19
Payer: MEDICARE

## 2025-02-23 DIAGNOSIS — G47.00 INSOMNIA, UNSPECIFIED TYPE: ICD-10-CM

## 2025-02-24 RX ORDER — DIAZEPAM 5 MG/1
5 TABLET ORAL NIGHTLY
Qty: 30 TABLET | Refills: 3 | Status: SHIPPED | OUTPATIENT
Start: 2025-02-24

## 2025-02-24 NOTE — TELEPHONE ENCOUNTER
No care due was identified.  [facility] Embedded Care Due Messages. Reference number: [id number].   2/23/2025 8:54:35 PM CST

## 2025-03-10 DIAGNOSIS — I48.0 PAROXYSMAL ATRIAL FIBRILLATION: ICD-10-CM

## 2025-03-10 DIAGNOSIS — I10 ESSENTIAL HYPERTENSION: ICD-10-CM

## 2025-03-10 NOTE — TELEPHONE ENCOUNTER
No care due was identified.  [facility] Embedded Care Due Messages. Reference number: [id number].   3/10/2025 2:28:41 PM CDT

## 2025-03-11 RX ORDER — METOPROLOL SUCCINATE 25 MG/1
25 TABLET, EXTENDED RELEASE ORAL DAILY
Qty: 90 TABLET | Refills: 2 | Status: SHIPPED | OUTPATIENT
Start: 2025-03-11

## 2025-03-11 NOTE — TELEPHONE ENCOUNTER
Refill Routing Note   Medication(s) are not appropriate for processing by [facility] for the following reason(s):        Required vitals abnormal    [facility] action(s):  Defer             Appointments  past 12m or future 3m with PCP    Date Provider   Last Visit   10/18/2024 Anh Atkinson MD   Next Visit   5/23/2025 Anh Atkinson MD   ED visits in past 90 days: 0        Note composed:11:35 AM 03/11/2025

## 2025-03-24 NOTE — TELEPHONE ENCOUNTER
Refill Routing Note   Medication(s) are not appropriate for processing by [facility] for the following reason(s):        Required labs abnormal    [facility] action(s):  Defer   Requires labs : Yes             Appointments  past 12m or future 3m with PCP    Date Provider   Last Visit   10/18/2024 Anh Atkinson MD   Next Visit   5/23/2025 Anh Atkinson MD   ED visits in past 90 days: 0        Note composed:10:32 PM 03/23/2025

## 2025-03-24 NOTE — TELEPHONE ENCOUNTER
Care Due:                  Date            Visit Type   Department     Provider  --------------------------------------------------------------------------------                                EP -                              PRIMARY      [facility] INTERNAL  Last Visit: 10-      CARE ([facility])   MEDICINE       Anh Atkinson                              [facility]                              PRIMARY      [facility] INTERNAL  Next Visit: 05-      [facility] ([facility])   [facility]       Anh Atkinson                                                            Last  Test          Frequency    Reason                     Performed    Due Date  --------------------------------------------------------------------------------    HBA1C.......  6 months...  SITagliptin, insulin.....  10-   04-    Lipid Panel.  12 months..  lovastatin...............  04- 03-    [facility] Embedded Care Due Messages. Reference number: [id number].   3/23/2025 9:49:09 PM CDT

## 2025-04-13 RX ORDER — LOSARTAN POTASSIUM 50 MG/1
50 TABLET ORAL DAILY
Qty: 90 TABLET | Refills: 1 | Status: SHIPPED | OUTPATIENT
Start: 2025-04-13

## 2025-04-14 NOTE — TELEPHONE ENCOUNTER
Refill Routing Note   Medication(s) are not appropriate for processing by [facility] for the following reason(s):        Required vitals abnormal  Required labs abnormal    [facility] action(s):  Defer             Appointments  past 12m or future 3m with PCP    Date Provider   Last Visit   10/18/2024 Anh Atkinson MD   Next Visit   5/23/2025 Anh Atkinson MD   ED visits in past 90 days: 0        Note composed:11:06 PM 04/13/2025

## 2025-04-14 NOTE — TELEPHONE ENCOUNTER
No care due was identified.  [facility] Embedded Care Due Messages. Reference number: [id number].   4/13/2025 10:22:58 PM CDT

## 2025-05-04 DIAGNOSIS — M41.9 SCOLIOSIS OF LUMBAR SPINE, UNSPECIFIED SCOLIOSIS TYPE: ICD-10-CM

## 2025-05-05 RX ORDER — BACLOFEN 10 MG/1
10 TABLET ORAL 2 TIMES DAILY
Qty: 60 TABLET | Refills: 3 | Status: SHIPPED | OUTPATIENT
Start: 2025-05-05

## 2025-05-05 NOTE — TELEPHONE ENCOUNTER
Refill Routing Note   Medication(s) are not appropriate for processing by [facility] for the following reason(s):        Outside of protocol    [facility] action(s):  Route               Appointments  past 12m or future 3m with PCP    Date Provider   Last Visit   10/18/2024 Anh Atkinson MD   Next Visit   5/23/2025 Anh Atkinson MD   ED visits in past 90 days: 0        Note composed:9:35 AM 05/05/2025

## 2025-06-27 RX ORDER — HYDROCORTISONE 25 MG/G
CREAM TOPICAL 2 TIMES DAILY
Qty: 30 G | Refills: 1 | Status: SHIPPED | OUTPATIENT
Start: 2025-06-27

## 2025-06-27 NOTE — TELEPHONE ENCOUNTER
Refill Routing Note   Medication(s) are not appropriate for processing by [facility] for the following reason(s):        Outside of protocol    [facility] action(s):  Route               Appointments  past 12m or future 3m with PCP    Date Provider   Last Visit   10/18/2024 Anh Atkinson MD   Next Visit   7/14/2025 Anh Atkinson MD   ED visits in past 90 days: 0        Note composed:10:46 PM 06/26/2025

## 2025-06-27 NOTE — TELEPHONE ENCOUNTER
Care Due:                  Date            Visit Type   Department     Provider  --------------------------------------------------------------------------------                                EP -                              PRIMARY      [facility] INTERNAL  Last Visit: 10-      CARE ([facility])   MIGUEL Atkinson                              [facility]                              PRIMARY      [facility] INTERNAL  Next Visit: 07-      [facility] ([facility])   [facility]       Anh Atkinson                                                            Last  Test          Frequency    Reason                     Performed    Due Date  --------------------------------------------------------------------------------    HBA1C.......  6 months...  insulin..................  10-   04-    Lipid Panel.  12 months..  lovastatin...............  04- 03-    [facility] Embedded Care Due Messages. Reference number: [id number].   6/26/2025 10:12:44 PM CDT

## 2025-07-06 DIAGNOSIS — G47.00 INSOMNIA, UNSPECIFIED TYPE: ICD-10-CM

## 2025-07-06 NOTE — TELEPHONE ENCOUNTER
No care due was identified.  [facility] Embedded Care Due Messages. Reference number: [id number].   7/06/2025 3:08:45 PM CDT

## 2025-07-07 RX ORDER — DIAZEPAM 5 MG/1
5 TABLET ORAL NIGHTLY
Qty: 30 TABLET | Refills: 3 | Status: SHIPPED | OUTPATIENT
Start: 2025-07-07

## 2025-07-07 RX ORDER — TRAZODONE HYDROCHLORIDE 150 MG/1
TABLET ORAL
Qty: 90 TABLET | Refills: 1 | Status: SHIPPED | OUTPATIENT
Start: 2025-07-07

## 2025-07-07 RX ORDER — GABAPENTIN 300 MG/1
CAPSULE ORAL
Qty: 270 CAPSULE | Refills: 3 | Status: SHIPPED | OUTPATIENT
Start: 2025-07-07

## 2025-07-07 NOTE — TELEPHONE ENCOUNTER
No care due was identified.  [facility] Embedded Care Due Messages. Reference number: [id number].   7/06/2025 10:17:00 PM CDT

## 2025-07-07 NOTE — TELEPHONE ENCOUNTER
Refill Decision Note   Val Bragg  is requesting a refill authorization.  Brief Assessment and Rationale for Refill:  Approve     Medication Therapy Plan:        Comments:     Note composed:5:43 PM 07/07/2025

## 2025-07-17 ENCOUNTER — PATIENT MESSAGE (OUTPATIENT)
Dept: ADMINISTRATIVE | Facility: HOSPITAL | Age: 80
End: 2025-07-17
Payer: MEDICARE

## 2025-08-17 DIAGNOSIS — I10 ESSENTIAL HYPERTENSION: ICD-10-CM

## 2025-08-18 RX ORDER — HYDROCHLOROTHIAZIDE 25 MG/1
25 TABLET ORAL DAILY
Qty: 90 TABLET | Refills: 3 | Status: SHIPPED | OUTPATIENT
Start: 2025-08-18

## (undated) DEVICE — DRESSING LEUKOPLAST FLEX 1X3IN

## (undated) DEVICE — HYDRODISSECTOR NUCLEUS 27GX7/8

## (undated) DEVICE — SHIELD EYE METAL FOX 50/BX

## (undated) DEVICE — Device

## (undated) DEVICE — NDL FLTR 5MCRN BLNT TIP 18GX1

## (undated) DEVICE — SOL BETADINE 5%

## (undated) DEVICE — KIT GREY EYE

## (undated) DEVICE — GOWN SURGICAL X-LARGE

## (undated) DEVICE — SOLUTION BSS PLUS

## (undated) DEVICE — STRIP MG FML-GLO .06 - ORDER F

## (undated) DEVICE — DRAPE STERI 32 X 50

## (undated) DEVICE — SOL WATER STRL IRR 1000ML